# Patient Record
Sex: FEMALE | Race: WHITE | NOT HISPANIC OR LATINO | Employment: PART TIME | URBAN - METROPOLITAN AREA
[De-identification: names, ages, dates, MRNs, and addresses within clinical notes are randomized per-mention and may not be internally consistent; named-entity substitution may affect disease eponyms.]

---

## 2017-08-09 ENCOUNTER — GENERIC CONVERSION - ENCOUNTER (OUTPATIENT)
Dept: OTHER | Facility: OTHER | Age: 67
End: 2017-08-09

## 2017-09-24 ENCOUNTER — OFFICE VISIT (OUTPATIENT)
Dept: URGENT CARE | Facility: MEDICAL CENTER | Age: 67
End: 2017-09-24
Payer: COMMERCIAL

## 2017-09-24 ENCOUNTER — APPOINTMENT (OUTPATIENT)
Dept: RADIOLOGY | Facility: MEDICAL CENTER | Age: 67
End: 2017-09-24
Payer: COMMERCIAL

## 2017-09-24 DIAGNOSIS — S99.929A INJURY OF FOOT: ICD-10-CM

## 2017-09-24 PROCEDURE — 73630 X-RAY EXAM OF FOOT: CPT

## 2017-09-24 PROCEDURE — 99213 OFFICE O/P EST LOW 20 MIN: CPT

## 2018-03-14 ENCOUNTER — APPOINTMENT (OUTPATIENT)
Dept: RADIOLOGY | Facility: MEDICAL CENTER | Age: 68
End: 2018-03-14
Payer: COMMERCIAL

## 2018-03-14 ENCOUNTER — TRANSCRIBE ORDERS (OUTPATIENT)
Dept: ADMINISTRATIVE | Facility: HOSPITAL | Age: 68
End: 2018-03-14

## 2018-03-14 DIAGNOSIS — M25.552 PAIN OF LEFT HIP JOINT: Primary | ICD-10-CM

## 2018-03-14 PROCEDURE — 73502 X-RAY EXAM HIP UNI 2-3 VIEWS: CPT

## 2018-04-06 ENCOUNTER — OFFICE VISIT (OUTPATIENT)
Dept: OBGYN CLINIC | Facility: MEDICAL CENTER | Age: 68
End: 2018-04-06
Payer: COMMERCIAL

## 2018-04-06 VITALS
SYSTOLIC BLOOD PRESSURE: 131 MMHG | BODY MASS INDEX: 32.44 KG/M2 | DIASTOLIC BLOOD PRESSURE: 70 MMHG | HEIGHT: 64 IN | WEIGHT: 190 LBS | HEART RATE: 67 BPM

## 2018-04-06 DIAGNOSIS — M16.12 ARTHRITIS OF LEFT HIP: Primary | ICD-10-CM

## 2018-04-06 DIAGNOSIS — M70.62 TROCHANTERIC BURSITIS OF LEFT HIP: ICD-10-CM

## 2018-04-06 DIAGNOSIS — M25.552 LEFT HIP PAIN: ICD-10-CM

## 2018-04-06 PROCEDURE — 20610 DRAIN/INJ JOINT/BURSA W/O US: CPT | Performed by: ORTHOPAEDIC SURGERY

## 2018-04-06 PROCEDURE — 99203 OFFICE O/P NEW LOW 30 MIN: CPT | Performed by: ORTHOPAEDIC SURGERY

## 2018-04-06 RX ORDER — TORSEMIDE 20 MG/1
20 TABLET ORAL 2 TIMES DAILY
Refills: 3 | COMMUNITY
Start: 2018-03-13

## 2018-04-06 RX ORDER — LIDOCAINE HYDROCHLORIDE 10 MG/ML
2 INJECTION, SOLUTION INFILTRATION; PERINEURAL
Status: COMPLETED | OUTPATIENT
Start: 2018-04-06 | End: 2018-04-06

## 2018-04-06 RX ORDER — LEVOTHYROXINE SODIUM 100 UG/1
CAPSULE ORAL
Refills: 3 | COMMUNITY
Start: 2018-01-14

## 2018-04-06 RX ORDER — METHYLPREDNISOLONE ACETATE 40 MG/ML
2 INJECTION, SUSPENSION INTRA-ARTICULAR; INTRALESIONAL; INTRAMUSCULAR; SOFT TISSUE
Status: COMPLETED | OUTPATIENT
Start: 2018-04-06 | End: 2018-04-06

## 2018-04-06 RX ORDER — ALLOPURINOL 100 MG/1
100 TABLET ORAL 2 TIMES DAILY
Refills: 3 | COMMUNITY
Start: 2018-03-28

## 2018-04-06 RX ORDER — BUPROPION HYDROCHLORIDE 150 MG/1
TABLET, EXTENDED RELEASE ORAL
Refills: 3 | COMMUNITY
Start: 2018-03-28 | End: 2019-11-01

## 2018-04-06 RX ORDER — OMEPRAZOLE 40 MG/1
40 CAPSULE, DELAYED RELEASE ORAL
Refills: 3 | COMMUNITY
Start: 2018-01-15

## 2018-04-06 RX ORDER — UBIDECARENONE 75 MG
CAPSULE ORAL
COMMUNITY

## 2018-04-06 RX ORDER — BUPIVACAINE HYDROCHLORIDE 2.5 MG/ML
2 INJECTION, SOLUTION INFILTRATION; PERINEURAL
Status: COMPLETED | OUTPATIENT
Start: 2018-04-06 | End: 2018-04-06

## 2018-04-06 RX ORDER — ESCITALOPRAM OXALATE 10 MG/1
10 TABLET ORAL
Refills: 3 | COMMUNITY
Start: 2018-03-10 | End: 2020-07-28

## 2018-04-06 RX ADMIN — METHYLPREDNISOLONE ACETATE 2 ML: 40 INJECTION, SUSPENSION INTRA-ARTICULAR; INTRALESIONAL; INTRAMUSCULAR; SOFT TISSUE at 15:18

## 2018-04-06 RX ADMIN — LIDOCAINE HYDROCHLORIDE 2 ML: 10 INJECTION, SOLUTION INFILTRATION; PERINEURAL at 15:18

## 2018-04-06 RX ADMIN — BUPIVACAINE HYDROCHLORIDE 2 ML: 2.5 INJECTION, SOLUTION INFILTRATION; PERINEURAL at 15:18

## 2018-04-06 NOTE — PROGRESS NOTES
74 y o female presents for initial evaluation of left hip pain patient notes that she has had left hip pain for several months in both the lateral aspect of the hip and the groin  She notes that the groin is more painful on a daily basis the lateral aspect of the hip  She denies any radiating down the anterior aspect of the leg not past the knee  No complaints of falling traumatic injury or distal numbing or pain    Review of Systems  Review of systems negative unless otherwise specified in HPI    Past Medical History  Past Medical History:   Diagnosis Date    Cancer [facility])        Past Surgical History  Past Surgical History:   Procedure Laterality Date    GALLBLADDER SURGERY      GASTRIC RESTRICTION SURGERY      HERNIA REPAIR      HYSTERECTOMY         Current Medications  No current outpatient prescriptions on file prior to visit  No current facility-administered medications on file prior to visit          Recent Labs [facility])    0  Lab Value Date/Time   HCT 40 8 05/11/2016 0805   HCT 43 7 03/04/2015 0846   HGB 13 7 05/11/2016 0805   HGB 14 5 03/04/2015 0846   WBC 5 94 05/11/2016 0805   WBC 6 59 03/04/2015 0846   INR 1 12 01/13/2014 1453   GLUCOSE 81 05/11/2016 0805   GLUCOSE 86 10/07/2015 0935         Physical exam  · General: Awake, Alert, Oriented  · Eyes: Pupils equal, round and reactive to light  · Heart: regular rate and rhythm  · Lungs: No audible wheezing  · Abdomen: soft  left Lower extremity  · Tender to palpation over groin as well as greater trochanter  · Hip flexion 120° hip abduction 50° external rotation 40° internal rotation 40° pain with hip flexion hip internal rotation  · 5/5 strength hip flexion hip abduction  · Negative straight leg raise  · Positive Stinchfield  · Negative CHICO  ·     Procedure  Large joint arthrocentesis  Date/Time: 4/6/2018 3:18 PM  Consent given by: patient  Supporting Documentation  Indications: pain   Procedure Details  Location: hip - L greater trochanteric bursa  Preparation: Patient was prepped and draped in the usual sterile fashion  Needle size: 22 G  Ultrasound guidance: no  Approach: lateral  Medications administered: 2 mL bupivacaine 0 25 %; 2 mL lidocaine 1 %; 2 mL methylPREDNISolone acetate 40 mg/mL              Imaging  X-ray left hip shows moderate degenerative arthritis    Assessment:   74 y  o female with left hip osteoarthritis    Plan  · Weightbearing:  As tolerated  · Activity:   As tolerate  ·  patient will be scheduled for fluoroscopically guided left hip injection  · Follow-up: 3 months

## 2018-04-11 ENCOUNTER — HOSPITAL ENCOUNTER (OUTPATIENT)
Dept: RADIOLOGY | Facility: HOSPITAL | Age: 68
Discharge: HOME | End: 2018-04-11
Admitting: RADIOLOGY
Payer: COMMERCIAL

## 2018-04-11 DIAGNOSIS — M16.12 ARTHRITIS OF LEFT HIP: ICD-10-CM

## 2018-04-11 PROCEDURE — 20610 DRAIN/INJ JOINT/BURSA W/O US: CPT

## 2018-04-11 PROCEDURE — 77002 NEEDLE LOCALIZATION BY XRAY: CPT

## 2018-04-11 RX ORDER — BETAMETHASONE SODIUM PHOSPHATE AND BETAMETHASONE ACETATE 3; 3 MG/ML; MG/ML
12 INJECTION, SUSPENSION INTRA-ARTICULAR; INTRALESIONAL; INTRAMUSCULAR; SOFT TISSUE ONCE
Status: COMPLETED | OUTPATIENT
Start: 2018-04-11 | End: 2018-04-11

## 2018-04-11 RX ORDER — BUPIVACAINE HYDROCHLORIDE 2.5 MG/ML
30 INJECTION, SOLUTION EPIDURAL; INFILTRATION; INTRACAUDAL
Status: COMPLETED | OUTPATIENT
Start: 2018-04-11 | End: 2018-04-11

## 2018-04-11 RX ORDER — LIDOCAINE HYDROCHLORIDE 10 MG/ML
20 INJECTION, SOLUTION INFILTRATION; PERINEURAL
Status: COMPLETED | OUTPATIENT
Start: 2018-04-11 | End: 2018-04-11

## 2018-04-11 RX ADMIN — BUPIVACAINE HYDROCHLORIDE 2 ML: 2.5 INJECTION, SOLUTION EPIDURAL; INFILTRATION; INTRACAUDAL at 12:30

## 2018-04-11 RX ADMIN — LIDOCAINE HYDROCHLORIDE 2 ML: 10 INJECTION, SOLUTION INFILTRATION; PERINEURAL at 12:30

## 2018-04-11 RX ADMIN — BETAMETHASONE SODIUM PHOSPHATE AND BETAMETHASONE ACETATE 12 MG: 3; 3 INJECTION, SUSPENSION INTRA-ARTICULAR; INTRALESIONAL; INTRAMUSCULAR at 12:30

## 2018-04-11 RX ADMIN — IOHEXOL 2 ML: 300 INJECTION, SOLUTION INTRAVENOUS at 12:30

## 2018-12-06 ENCOUNTER — TELEPHONE (OUTPATIENT)
Dept: BARIATRICS | Facility: CLINIC | Age: 68
End: 2018-12-06

## 2018-12-06 NOTE — TELEPHONE ENCOUNTER
----- Message from Elizabeth Butler RN sent at 2018  8:14 AM EST -----  Regardin year f/u  Please call patient and schedule 6 year follow up appointment    Thank You

## 2018-12-11 ENCOUNTER — HOSPITAL ENCOUNTER (OUTPATIENT)
Dept: RADIOLOGY | Facility: HOSPITAL | Age: 68
Discharge: HOME | End: 2018-12-11
Attending: ORTHOPAEDIC SURGERY
Payer: COMMERCIAL

## 2018-12-11 ENCOUNTER — OFFICE VISIT (OUTPATIENT)
Dept: OBGYN CLINIC | Facility: HOSPITAL | Age: 68
End: 2018-12-11
Payer: COMMERCIAL

## 2018-12-11 VITALS
DIASTOLIC BLOOD PRESSURE: 80 MMHG | HEART RATE: 55 BPM | WEIGHT: 208 LBS | BODY MASS INDEX: 35.51 KG/M2 | SYSTOLIC BLOOD PRESSURE: 162 MMHG | HEIGHT: 64 IN

## 2018-12-11 DIAGNOSIS — M19.032 CMC DJD(CARPOMETACARPAL DEGENERATIVE JOINT DISEASE), LOCALIZED PRIMARY, LEFT: ICD-10-CM

## 2018-12-11 DIAGNOSIS — M19.032 CMC DJD(CARPOMETACARPAL DEGENERATIVE JOINT DISEASE), LOCALIZED PRIMARY, LEFT: Primary | ICD-10-CM

## 2018-12-11 DIAGNOSIS — M67.432 GANGLION CYST OF DORSUM OF LEFT WRIST: ICD-10-CM

## 2018-12-11 DIAGNOSIS — G56.02 CARPAL TUNNEL SYNDROME ON LEFT: ICD-10-CM

## 2018-12-11 PROCEDURE — 73120 X-RAY EXAM OF HAND: CPT

## 2018-12-11 PROCEDURE — 99213 OFFICE O/P EST LOW 20 MIN: CPT | Performed by: ORTHOPAEDIC SURGERY

## 2018-12-11 NOTE — PROGRESS NOTES
Assessment:  No diagnosis found  Plan:  The patient presents with left thumb CMC joint pain with numbness into thumb, index and long finger for 5-6 months with no injury  Using her hand increases both pain and numbness  Exam shows tenderness over dorsal radiocarpal and CMC joint as well as positive tinel's sign  X-rays demonstrate CMC joint arthritic changes  The patient will be referred to [address]  Follow up PRN  To do next visit:  No Follow-up on file  The above stated was discussed in layman's terms and the patient expressed understanding  All questions were answered to the patient's satisfaction  Scribe Attestation    I,:   Chi Dasilva am acting as a scribe while in the presence of the attending physician :        I,:   Mj Rubin MD personally performed the services described in this documentation    as scribed in my presence :              Subjective:   Alyssa Lundy is a 79 y o  female who presents for left thumb pain  She has had symptoms for 5-6 months with no injury  Today she complains of left thumb pain and swelling  Using the left hand can create numbness of digits 1-3  She rates her pain 5/10  Grasping itmes is challenging  Using the hand aggravates the pain and numbness  Resting the hand alleviates  She does use tylenol for her pain with some benefit  She denies previous history of left thumb issues  Review of systems negative unless otherwise specified in HPI    Past Medical History:   Diagnosis Date    Cancer [facility])        Past Surgical History:   Procedure Laterality Date    GALLBLADDER SURGERY      GASTRIC RESTRICTION SURGERY      HERNIA REPAIR      HYSTERECTOMY         Family History   Problem Relation Age of Onset    Heart attack Mother     Asthma Mother     Heart attack Father        Social History     Occupational History    Not on file       Social History Main Topics    Smoking status: Former Smoker    Smokeless tobacco: Never Used  Alcohol use Yes    Drug use: Unknown    Sexual activity: Not on file         Current Outpatient Prescriptions:     allopurinol (ZYLOPRIM) 100 mg tablet, Take 100 mg by mouth 2 (two) times a day, Disp: , Rfl: 3    buPROPion (WELLBUTRIN SR) 150 mg 12 hr tablet, TAKE 1 TABLET BY MOUTH IN THE MORNING FOR 4 DAYS, THEN INCREASE TO 1 TABLET TWICE DAILY, Disp: , Rfl: 3    Calcium Carbonate-Vitamin D 600-400 MG-UNIT per chew tablet, Chew, Disp: , Rfl:     cyanocobalamin (VITAMIN B-12) 100 mcg tablet, Take by mouth, Disp: , Rfl:     escitalopram (LEXAPRO) 10 mg tablet, Take 10 mg by mouth daily, Disp: , Rfl: 3    levothyroxine 75 mcg tablet, Take 75 mcg by mouth daily, Disp: , Rfl: 3    omeprazole (PriLOSEC) 40 MG capsule, Take 40 mg by mouth daily, Disp: , Rfl: 3    torsemide (DEMADEX) 20 mg tablet, Take 20 mg by mouth 2 (two) times a day, Disp: , Rfl: 3    Allergies   Allergen Reactions    Morphine Hcl  [Morphine] Itching            Vitals:    12/11/18 1632   BP: 162/80   Pulse: 55       Objective:          Physical Exam  Physical exam  · General: Awake, Alert, Oriented  · Eyes: Pupils equal, round and reactive to light  · Heart: regular rate and rhythm  · Lungs: No audible wheezing  · Abdomen: soft                    Ortho Exam  Left thumb:  Limited motion of CMC joint, pain with motion  Pain with extension, flexion and abduction  TTP CMC joint  Slight atrophy of thenar eminence compared to contralateral side     Left wrist:  Positive tinel's sign  Palpable mass over dorsum of left radial wrist  TTP left radiocarpal joint           Diagnostics, reviewed and taken today if performed as documented: The attending physician has personally reviewed the pertinent films in PACS and interpretation is as follows:  Left thumb/wrist x-ray: arthritic changes at [facility] joint      Procedures, if performed today:    Procedures    None performed      Portions of the record may have been created with voice recognition software   Occasional wrong word or "sound a like" substitutions may have occurred due to the inherent limitations of voice recognition software   Read the chart carefully and recognize, using context, where substitutions have occurred

## 2019-01-18 ENCOUNTER — PREP FOR PROCEDURE (OUTPATIENT)
Dept: OBGYN CLINIC | Facility: HOSPITAL | Age: 69
End: 2019-01-18

## 2019-01-18 ENCOUNTER — HOSPITAL ENCOUNTER (OUTPATIENT)
Dept: RADIOLOGY | Facility: HOSPITAL | Age: 69
Discharge: HOME | End: 2019-01-18
Attending: ORTHOPAEDIC SURGERY
Payer: COMMERCIAL

## 2019-01-18 ENCOUNTER — OFFICE VISIT (OUTPATIENT)
Dept: OBGYN CLINIC | Facility: HOSPITAL | Age: 69
End: 2019-01-18
Attending: ORTHOPAEDIC SURGERY
Payer: COMMERCIAL

## 2019-01-18 VITALS
WEIGHT: 209 LBS | BODY MASS INDEX: 35.68 KG/M2 | DIASTOLIC BLOOD PRESSURE: 72 MMHG | HEART RATE: 62 BPM | SYSTOLIC BLOOD PRESSURE: 154 MMHG | HEIGHT: 64 IN

## 2019-01-18 DIAGNOSIS — G56.02 CARPAL TUNNEL SYNDROME ON LEFT: ICD-10-CM

## 2019-01-18 DIAGNOSIS — M18.12 ARTHRITIS OF CARPOMETACARPAL (CMC) JOINT OF LEFT THUMB: Primary | ICD-10-CM

## 2019-01-18 DIAGNOSIS — M79.645 CHRONIC PAIN OF LEFT THUMB: ICD-10-CM

## 2019-01-18 DIAGNOSIS — G89.29 CHRONIC PAIN OF LEFT THUMB: ICD-10-CM

## 2019-01-18 DIAGNOSIS — M67.432 GANGLION CYST OF DORSUM OF LEFT WRIST: ICD-10-CM

## 2019-01-18 PROCEDURE — 99214 OFFICE O/P EST MOD 30 MIN: CPT | Performed by: ORTHOPAEDIC SURGERY

## 2019-01-18 PROCEDURE — 73140 X-RAY EXAM OF FINGER(S): CPT

## 2019-01-18 RX ORDER — CEFAZOLIN SODIUM 2 G/50ML
2000 SOLUTION INTRAVENOUS ONCE
Status: CANCELLED | OUTPATIENT
Start: 2019-01-18 | End: 2019-01-18

## 2019-01-18 NOTE — PROGRESS NOTES
ASSESSMENT/PLAN:    Assessment:   Carpal Tunnel Syndrome  left and Thumb CMC Arthritis  left    Plan:   Endoscopic Carpal Tunnel Release  left and Thumb Interpositional Arthroplasty Brenda Jenkins) with FCR transfer  left    Follow Up: After Surgery    To Do Next Visit:  Sutures out    General Discussions:       Operative Discussions:  Endoscopic Carpal Tunnel Release: The anatomy and physiology of carpal tunnel syndrome was discussed with the patient today  Increase pressure localized under the transverse carpal ligament can cause pain, numbness, tingling, or dysesthesias within the median nerve distribution as well as feelings of fatigue, clumsiness, or awkwardness  These symptoms typically occur at night and worse in the morning upon waking  Eventually, untreated carpal tunnel syndrome can result in weakness and permanent loss of muscle within the thenar compartment of the hand  Treatment options were discussed with the patient  Conservative treatment includes nocturnal resting splints to keep the nerve in a neutral position, ergonomic changes within the work or home environment, activity modification, and tendon gliding exercises  Steroid injections within the carpal canal can help a majority of patients, however this is often self-limited in a majority of patients  Surgical intervention to divide the transverse carpal ligament typically results in a long-lasting relief of the patient's complaints, with the recurrence rate of less than 1%  The patient has elected to undergo an endoscopic carpal tunnel release  The 2 incision technique was discussed with the patient, which results in approximately a two-week less recovery time, and less wound complications  In the postoperative period, light activities are allowed immediately, driving is allowed when narcotic medication has stopped, and the bandages may be removed and incision may get wet after 2 days    Heavy activities (lifting more than approximately 10 pounds) will be allowed after follow up appointment in 1-2 weeks  While the pain and discomfort in the hands generally improves rapidly, the numbness and tingling as well as the strength will slowly improve over weeks to months depending on the severity of the carpal tunnel syndrome  Pillar pain was discussed with the patient, which is typically a common but self-limiting condition  The risks of bleeding and infection from the surgery are less than 1%  Risk of recurrence is approximately 0 5%  The risks of nerve injury or nerve damage or damage to the blood vessels is approximately 1 in 1200  The patient has an understanding of the above mentioned discussion  The risks and benefits of the procedure were explained to the patient, which include, but are not limited to: Bleeding, infection, recurrence, pain, scar, damage to tendons, damage to nerves, and damage to blood vessels, failure to give desired results and complications related to anesthesia   These risks, along with alternative conservative treatment options, and postoperative protocols were voiced back and understood by the patient   All questions were answered to the patient's satisfaction   The patient agrees to comply with a standard postoperative protocol, and is willing to proceed   Education was provided via written and auditory forms   There were no barriers to learning  Written handouts regarding wound care, incision and scar care, and general preoperative information was provided to the patient   Prior to surgery, the patient may be requested to stop all anti-inflammatory medications   Prophylactic aspirin, Plavix, and Coumadin may be allowed to be continued   Medications including vitamin E , ginkgo, and fish oil are requested to be stopped approximately one week prior to surgery   Hypertensive medications and beta blockers, if taken, should be continued        _____________________________________________________  CHIEF COMPLAINT:  Chief Complaint   Patient presents with    Left Thumb - Pain         SUBJECTIVE:  Asher Carvajal is a 79y o  year old female who presents with Pain  Severe  Intermittant  Sharp and Aching to the left thumb  This started  >5 year(s) ago as Sudden  Patient also c/o numbness and tingling that does wake her from sleep at night  Patient states that she cannot even wring out a wash cloth or open a jar due to her discomfort at her thumb  She states that she hardly uses her left hand at this point in time and uses her right hand for everything     Radiation: Yes to the  index finger, long finger and thumb  Previous Treatments: activity modification, NSAIDs, tylenol without relief  Associated symptoms: No Complaints    PAST MEDICAL HISTORY:  Past Medical History:   Diagnosis Date    Cancer ([address] )        PAST SURGICAL HISTORY:  Past Surgical History:   Procedure Laterality Date    GALLBLADDER SURGERY      GASTRIC RESTRICTION SURGERY      HERNIA REPAIR      HYSTERECTOMY         FAMILY HISTORY:  Family History   Problem Relation Age of Onset    Heart attack Mother     Asthma Mother     Heart attack Father        SOCIAL HISTORY:  Social History   Substance Use Topics    Smoking status: Former Smoker    Smokeless tobacco: Never Used    Alcohol use Yes       MEDICATIONS:    Current Outpatient Prescriptions:     allopurinol (ZYLOPRIM) 100 mg tablet, Take 100 mg by mouth 2 (two) times a day, Disp: , Rfl: 3    buPROPion (WELLBUTRIN SR) 150 mg 12 hr tablet, TAKE 1 TABLET BY MOUTH IN THE MORNING FOR 4 DAYS, THEN INCREASE TO 1 TABLET TWICE DAILY, Disp: , Rfl: 3    Calcium Carbonate-Vitamin D 600-400 MG-UNIT per chew tablet, Chew, Disp: , Rfl:     cyanocobalamin (VITAMIN B-12) 100 mcg tablet, Take by mouth, Disp: , Rfl:     escitalopram (LEXAPRO) 10 mg tablet, Take 10 mg by mouth daily, Disp: , Rfl: 3    levothyroxine 75 mcg tablet, Take 75 mcg by mouth daily, Disp: , Rfl: 3    omeprazole (PriLOSEC) 40 MG capsule, Take 40 mg by mouth daily, Disp: , Rfl: 3    torsemide (DEMADEX) 20 mg tablet, Take 20 mg by mouth 2 (two) times a day, Disp: , Rfl: 3    ALLERGIES:  Allergies   Allergen Reactions    Morphine Hcl  [Morphine] Itching       REVIEW OF SYSTEMS:  Pertinent items are noted in HPI  LABS:  HgA1c: No results found for: HGBA1C  BMP:   Lab Results   Component Value Date    GLUCOSE 86 10/07/2015    CALCIUM 8 9 05/11/2016     10/07/2015    K 3 8 05/11/2016    CO2 32 05/11/2016     05/11/2016    BUN 13 05/11/2016    CREATININE 0 75 05/11/2016         _____________________________________________________  PHYSICAL EXAMINATION:  General: well developed and well nourished, alert, oriented times 3 and appears comfortable  Psychiatric: Normal  HEENT: Trachea Midline, No torticollis  Cardiovascular: No discernable arrhythmia  Pulmonary: No wheezing or stridor  Skin: No masses, erthema, lacerations, fluctation, ulcerations  Neurovascular: Pulses Intact    MUSCULOSKELETAL EXAMINATION:  LEFT SIDE:  CMC: Positive grind, Positive tendnerness CMC and crepitation noted, hyperextension of 25 degrees and Carpal tunnel:  Weakness APB, Postive Tinel's, Positive Derkin's Compression Test and AIN 5/5, apb 4+/5    _____________________________________________________  STUDIES REVIEWED:  Images were reviewd in PACS: xray of left thumb on 1/18/19 demonstrates stage 3 arthritic changes to left thumb CMC joint         PROCEDURES PERFORMED:  Procedures  No Procedures performed today

## 2019-01-18 NOTE — H&P
ASSESSMENT/PLAN:    Assessment:   Carpal Tunnel Syndrome  left and Thumb CMC Arthritis  left    Plan:   Endoscopic Carpal Tunnel Release  left and Thumb Interpositional Arthroplasty Fran Dandy) with FCR transfer  left    Follow Up: After Surgery    To Do Next Visit:  Sutures out    General Discussions:       Operative Discussions:  Endoscopic Carpal Tunnel Release: The anatomy and physiology of carpal tunnel syndrome was discussed with the patient today  Increase pressure localized under the transverse carpal ligament can cause pain, numbness, tingling, or dysesthesias within the median nerve distribution as well as feelings of fatigue, clumsiness, or awkwardness  These symptoms typically occur at night and worse in the morning upon waking  Eventually, untreated carpal tunnel syndrome can result in weakness and permanent loss of muscle within the thenar compartment of the hand  Treatment options were discussed with the patient  Conservative treatment includes nocturnal resting splints to keep the nerve in a neutral position, ergonomic changes within the work or home environment, activity modification, and tendon gliding exercises  Steroid injections within the carpal canal can help a majority of patients, however this is often self-limited in a majority of patients  Surgical intervention to divide the transverse carpal ligament typically results in a long-lasting relief of the patient's complaints, with the recurrence rate of less than 1%  The patient has elected to undergo an endoscopic carpal tunnel release  The 2 incision technique was discussed with the patient, which results in approximately a two-week less recovery time, and less wound complications  In the postoperative period, light activities are allowed immediately, driving is allowed when narcotic medication has stopped, and the bandages may be removed and incision may get wet after 2 days    Heavy activities (lifting more than approximately 10 pounds) will be allowed after follow up appointment in 1-2 weeks  While the pain and discomfort in the hands generally improves rapidly, the numbness and tingling as well as the strength will slowly improve over weeks to months depending on the severity of the carpal tunnel syndrome  Pillar pain was discussed with the patient, which is typically a common but self-limiting condition  The risks of bleeding and infection from the surgery are less than 1%  Risk of recurrence is approximately 0 5%  The risks of nerve injury or nerve damage or damage to the blood vessels is approximately 1 in 1200  The patient has an understanding of the above mentioned discussion  The risks and benefits of the procedure were explained to the patient, which include, but are not limited to: Bleeding, infection, recurrence, pain, scar, damage to tendons, damage to nerves, and damage to blood vessels, failure to give desired results and complications related to anesthesia   These risks, along with alternative conservative treatment options, and postoperative protocols were voiced back and understood by the patient   All questions were answered to the patient's satisfaction   The patient agrees to comply with a standard postoperative protocol, and is willing to proceed   Education was provided via written and auditory forms   There were no barriers to learning  Written handouts regarding wound care, incision and scar care, and general preoperative information was provided to the patient   Prior to surgery, the patient may be requested to stop all anti-inflammatory medications   Prophylactic aspirin, Plavix, and Coumadin may be allowed to be continued   Medications including vitamin E , ginkgo, and fish oil are requested to be stopped approximately one week prior to surgery   Hypertensive medications and beta blockers, if taken, should be continued        _____________________________________________________  CHIEF COMPLAINT:  Chief Complaint   Patient presents with    Left Thumb - Pain         SUBJECTIVE:  Imelda Brooke is a 79y o  year old female who presents with Pain  Severe  Intermittant  Sharp and Aching to the left thumb  This started  >5 year(s) ago as Sudden  Patient also c/o numbness and tingling that does wake her from sleep at night  Patient states that she cannot even wring out a wash cloth or open a jar due to her discomfort at her thumb  She states that she hardly uses her left hand at this point in time and uses her right hand for everything     Radiation: Yes to the  index finger, long finger and thumb  Previous Treatments: activity modification, NSAIDs, tylenol without relief  Associated symptoms: No Complaints    PAST MEDICAL HISTORY:  Past Medical History:   Diagnosis Date    [address] [address] )        PAST SURGICAL HISTORY:  Past Surgical History:   Procedure Laterality Date    GALLBLADDER SURGERY      GASTRIC RESTRICTION SURGERY      HERNIA REPAIR      HYSTERECTOMY         FAMILY HISTORY:  Family History   Problem Relation Age of Onset    Heart attack Mother     Asthma Mother     Heart attack Father        SOCIAL HISTORY:  Social History   Substance Use Topics    Smoking status: Former Smoker    Smokeless tobacco: Never Used    Alcohol use Yes       MEDICATIONS:    Current Outpatient Prescriptions:     allopurinol (ZYLOPRIM) 100 mg tablet, Take 100 mg by mouth 2 (two) times a day, Disp: , Rfl: 3    buPROPion (WELLBUTRIN SR) 150 mg 12 hr tablet, TAKE 1 TABLET BY MOUTH IN THE MORNING FOR 4 DAYS, THEN INCREASE TO 1 TABLET TWICE DAILY, Disp: , Rfl: 3    Calcium Carbonate-Vitamin D 600-400 MG-UNIT per chew tablet, Chew, Disp: , Rfl:     cyanocobalamin (VITAMIN B-12) 100 mcg tablet, Take by mouth, Disp: , Rfl:     escitalopram (LEXAPRO) 10 mg tablet, Take 10 mg by mouth daily, Disp: , Rfl: 3    levothyroxine 75 mcg tablet, Take 75 mcg by mouth daily, Disp: , Rfl: 3    omeprazole (PriLOSEC) 40 MG capsule, Take 40 mg by mouth daily, Disp: , Rfl: 3    torsemide (DEMADEX) 20 mg tablet, Take 20 mg by mouth 2 (two) times a day, Disp: , Rfl: 3    ALLERGIES:  Allergies   Allergen Reactions    Morphine Hcl  [Morphine] Itching       REVIEW OF SYSTEMS:  Pertinent items are noted in HPI  LABS:  HgA1c: No results found for: HGBA1C  BMP:   Lab Results   Component Value Date    GLUCOSE 86 10/07/2015    CALCIUM 8 9 05/11/2016     10/07/2015    K 3 8 05/11/2016    CO2 32 05/11/2016     05/11/2016    BUN 13 05/11/2016    CREATININE 0 75 05/11/2016         _____________________________________________________  PHYSICAL EXAMINATION:  General: well developed and well nourished, alert, oriented times 3 and appears comfortable  Psychiatric: Normal  HEENT: Trachea Midline, No torticollis  Cardiovascular: No discernable arrhythmia  Pulmonary: No wheezing or stridor  Skin: No masses, erthema, lacerations, fluctation, ulcerations  Neurovascular: Pulses Intact    MUSCULOSKELETAL EXAMINATION:  LEFT SIDE:  CMC: Positive grind, Positive tendnerness CMC and crepitation noted, hyperextension of 25 degrees and Carpal tunnel:  Weakness APB, Postive Tinel's, Positive Derkin's Compression Test and AIN 5/5, apb 4+/5    _____________________________________________________  STUDIES REVIEWED:  Images were reviewd in PACS: xray of left thumb on 1/18/19 demonstrates stage 3 arthritic changes to left thumb CMC joint         PROCEDURES PERFORMED:  Procedures  No Procedures performed today

## 2019-02-26 ENCOUNTER — HOSPITAL ENCOUNTER (OUTPATIENT)
Dept: NON INVASIVE DIAGNOSTICS | Facility: CLINIC | Age: 69
Discharge: HOME | End: 2019-02-26
Attending: FAMILY MEDICINE
Payer: COMMERCIAL

## 2019-02-26 ENCOUNTER — TRANSCRIBE ORDERS (OUTPATIENT)
Dept: ADMINISTRATIVE | Facility: HOSPITAL | Age: 69
End: 2019-02-26

## 2019-02-26 DIAGNOSIS — M79.651 RIGHT THIGH PAIN: ICD-10-CM

## 2019-02-26 DIAGNOSIS — M79.651 RIGHT THIGH PAIN: Primary | ICD-10-CM

## 2019-02-26 PROCEDURE — 93971 EXTREMITY STUDY: CPT

## 2019-02-27 ENCOUNTER — APPOINTMENT (OUTPATIENT)
Dept: LAB | Facility: MEDICAL CENTER | Age: 69
End: 2019-02-27
Payer: COMMERCIAL

## 2019-02-27 ENCOUNTER — CLINICAL SUPPORT (OUTPATIENT)
Dept: URGENT CARE | Facility: MEDICAL CENTER | Age: 69
End: 2019-02-27
Payer: COMMERCIAL

## 2019-02-27 DIAGNOSIS — M18.12 ARTHRITIS OF CARPOMETACARPAL (CMC) JOINT OF LEFT THUMB: ICD-10-CM

## 2019-02-27 LAB
ANION GAP SERPL CALCULATED.3IONS-SCNC: 3 MMOL/L (ref 4–13)
ATRIAL RATE: 54 BPM
BASOPHILS # BLD AUTO: 0.08 THOUSANDS/ΜL (ref 0–0.1)
BASOPHILS NFR BLD AUTO: 1 % (ref 0–1)
BUN SERPL-MCNC: 19 MG/DL (ref 5–25)
CALCIUM SERPL-MCNC: 9.2 MG/DL (ref 8.3–10.1)
CHLORIDE SERPL-SCNC: 105 MMOL/L (ref 100–108)
CO2 SERPL-SCNC: 32 MMOL/L (ref 21–32)
CREAT SERPL-MCNC: 0.83 MG/DL (ref 0.6–1.3)
EOSINOPHIL # BLD AUTO: 0.75 THOUSAND/ΜL (ref 0–0.61)
EOSINOPHIL NFR BLD AUTO: 10 % (ref 0–6)
ERYTHROCYTE [DISTWIDTH] IN BLOOD BY AUTOMATED COUNT: 14 % (ref 11.6–15.1)
GFR SERPL CREATININE-BSD FRML MDRD: 74 ML/MIN/1.73SQ M
GLUCOSE P FAST SERPL-MCNC: 60 MG/DL (ref 65–99)
HCT VFR BLD AUTO: 43 % (ref 34.8–46.1)
HGB BLD-MCNC: 13.6 G/DL (ref 11.5–15.4)
IMM GRANULOCYTES # BLD AUTO: 0.03 THOUSAND/UL (ref 0–0.2)
IMM GRANULOCYTES NFR BLD AUTO: 0 % (ref 0–2)
LYMPHOCYTES # BLD AUTO: 1.03 THOUSANDS/ΜL (ref 0.6–4.47)
LYMPHOCYTES NFR BLD AUTO: 14 % (ref 14–44)
MCH RBC QN AUTO: 30.8 PG (ref 26.8–34.3)
MCHC RBC AUTO-ENTMCNC: 31.6 G/DL (ref 31.4–37.4)
MCV RBC AUTO: 97 FL (ref 82–98)
MONOCYTES # BLD AUTO: 0.55 THOUSAND/ΜL (ref 0.17–1.22)
MONOCYTES NFR BLD AUTO: 7 % (ref 4–12)
NEUTROPHILS # BLD AUTO: 4.98 THOUSANDS/ΜL (ref 1.85–7.62)
NEUTS SEG NFR BLD AUTO: 68 % (ref 43–75)
NRBC BLD AUTO-RTO: 0 /100 WBCS
P AXIS: 77 DEGREES
PLATELET # BLD AUTO: 217 THOUSANDS/UL (ref 149–390)
PMV BLD AUTO: 10.6 FL (ref 8.9–12.7)
POTASSIUM SERPL-SCNC: 4.1 MMOL/L (ref 3.5–5.3)
PR INTERVAL: 128 MS
QRS AXIS: 55 DEGREES
QRSD INTERVAL: 74 MS
QT INTERVAL: 448 MS
QTC INTERVAL: 424 MS
RBC # BLD AUTO: 4.42 MILLION/UL (ref 3.81–5.12)
SODIUM SERPL-SCNC: 140 MMOL/L (ref 136–145)
T WAVE AXIS: 41 DEGREES
VENTRICULAR RATE: 54 BPM
WBC # BLD AUTO: 7.42 THOUSAND/UL (ref 4.31–10.16)

## 2019-02-27 PROCEDURE — 93971 EXTREMITY STUDY: CPT | Performed by: SURGERY

## 2019-02-27 PROCEDURE — 85025 COMPLETE CBC W/AUTO DIFF WBC: CPT

## 2019-02-27 PROCEDURE — 93005 ELECTROCARDIOGRAM TRACING: CPT

## 2019-02-27 PROCEDURE — 80048 BASIC METABOLIC PNL TOTAL CA: CPT

## 2019-02-27 PROCEDURE — 93010 ELECTROCARDIOGRAM REPORT: CPT | Performed by: INTERNAL MEDICINE

## 2019-02-27 PROCEDURE — 36415 COLL VENOUS BLD VENIPUNCTURE: CPT

## 2019-03-19 NOTE — PRE-PROCEDURE INSTRUCTIONS
Pre-Surgery Instructions:   Medication Instructions    allopurinol (ZYLOPRIM) 100 mg tablet Instructed patient per Anesthesia Guidelines   buPROPion (WELLBUTRIN SR) 150 mg 12 hr tablet Instructed patient per Anesthesia Guidelines   Calcium Carbonate-Vitamin D 600-400 MG-UNIT per chew tablet Instructed patient per Anesthesia Guidelines   cyanocobalamin (VITAMIN B-12) 100 mcg tablet Instructed patient per Anesthesia Guidelines   escitalopram (LEXAPRO) 10 mg tablet Instructed patient per Anesthesia Guidelines   levothyroxine 75 mcg tablet Instructed patient per Anesthesia Guidelines   omeprazole (PriLOSEC) 40 MG capsule Instructed patient per Anesthesia Guidelines   torsemide (DEMADEX) 20 mg tablet Instructed patient per Anesthesia Guidelines  REVIEWED  PRINTED SURGICAL INSTRUCTIONS WITH PATIENT , PATIENT VERBALIZED UNDERSTANDING  MEDICATIONS REVIEWED  NO VITAMINS OR NSAIDS ONE WEEK BEFORE SURGERY, NPO AFTER MIDNIGHT  SOAP AND SHOWER INSTRUCTIONS REVIEWED  Patient will bring living will  No demadex or allopurinol  day of surgery  Diuretic Med Class     Continue this medication up to the evening before surgery/procedure, but do not take the morning of the day of surgery  Anti-gout Med Class     Continue to take this medication on your normal schedule  If this is an oral medication and you take it in the morning, then you may take this medicine with a sip of water  Antidepressant Med Class     Continue to take this medication on your normal schedule  If this is an oral medication and you take it in the morning, then you may take this medicine with a sip of water  Thyroxine Med Class     Continue to take this medication on your normal schedule  If this is an oral medication and you take it in the morning, then you may take this medicine with a sip of water  none

## 2019-03-26 ENCOUNTER — HOSPITAL ENCOUNTER (OUTPATIENT)
Facility: HOSPITAL | Age: 69
Setting detail: OUTPATIENT SURGERY
Discharge: HOME | End: 2019-03-26
Attending: ORTHOPAEDIC SURGERY | Admitting: ORTHOPAEDIC SURGERY
Payer: COMMERCIAL

## 2019-03-26 ENCOUNTER — ANESTHESIA EVENT (OUTPATIENT)
Dept: PERIOP | Facility: HOSPITAL | Age: 69
End: 2019-03-26
Payer: COMMERCIAL

## 2019-03-26 ENCOUNTER — ANESTHESIA (OUTPATIENT)
Dept: PERIOP | Facility: HOSPITAL | Age: 69
End: 2019-03-26
Payer: COMMERCIAL

## 2019-03-26 VITALS
TEMPERATURE: 97.3 F | RESPIRATION RATE: 20 BRPM | HEART RATE: 59 BPM | SYSTOLIC BLOOD PRESSURE: 132 MMHG | OXYGEN SATURATION: 99 % | DIASTOLIC BLOOD PRESSURE: 62 MMHG

## 2019-03-26 DIAGNOSIS — G56.02 CARPAL TUNNEL SYNDROME ON LEFT: ICD-10-CM

## 2019-03-26 DIAGNOSIS — M18.12 ARTHRITIS OF CARPOMETACARPAL (CMC) JOINT OF LEFT THUMB: ICD-10-CM

## 2019-03-26 PROCEDURE — C1765 ADHESION BARRIER: HCPCS | Performed by: ORTHOPAEDIC SURGERY

## 2019-03-26 PROCEDURE — 25310 TRANSPLANT FOREARM TENDON: CPT | Performed by: ORTHOPAEDIC SURGERY

## 2019-03-26 PROCEDURE — 25447 ARTHRP NTRCRP/CRP/MTCR NTRPS: CPT | Performed by: ORTHOPAEDIC SURGERY

## 2019-03-26 PROCEDURE — C1713 ANCHOR/SCREW BN/BN,TIS/BN: HCPCS | Performed by: ORTHOPAEDIC SURGERY

## 2019-03-26 PROCEDURE — 29848 WRIST ENDOSCOPY/SURGERY: CPT | Performed by: ORTHOPAEDIC SURGERY

## 2019-03-26 RX ORDER — MEPERIDINE HYDROCHLORIDE 25 MG/ML
12.5 INJECTION INTRAMUSCULAR; INTRAVENOUS; SUBCUTANEOUS AS NEEDED
Status: DISCONTINUED | OUTPATIENT
Start: 2019-03-26 | End: 2019-03-26 | Stop reason: HOSPADM

## 2019-03-26 RX ORDER — MIDAZOLAM HYDROCHLORIDE 1 MG/ML
INJECTION INTRAMUSCULAR; INTRAVENOUS
Status: DISCONTINUED
Start: 2019-03-26 | End: 2019-03-26 | Stop reason: HOSPADM

## 2019-03-26 RX ORDER — METOCLOPRAMIDE HYDROCHLORIDE 5 MG/ML
INJECTION INTRAMUSCULAR; INTRAVENOUS AS NEEDED
Status: DISCONTINUED | OUTPATIENT
Start: 2019-03-26 | End: 2019-03-26 | Stop reason: SURG

## 2019-03-26 RX ORDER — LIDOCAINE HYDROCHLORIDE 10 MG/ML
0.5 INJECTION, SOLUTION INFILTRATION; PERINEURAL ONCE AS NEEDED
Status: COMPLETED | OUTPATIENT
Start: 2019-03-26 | End: 2019-03-26

## 2019-03-26 RX ORDER — ROPIVACAINE HYDROCHLORIDE 5 MG/ML
INJECTION, SOLUTION EPIDURAL; INFILTRATION; PERINEURAL
Status: COMPLETED | OUTPATIENT
Start: 2019-03-26 | End: 2019-03-26

## 2019-03-26 RX ORDER — DEXAMETHASONE SODIUM PHOSPHATE 10 MG/ML
INJECTION, SOLUTION INTRAMUSCULAR; INTRAVENOUS AS NEEDED
Status: DISCONTINUED | OUTPATIENT
Start: 2019-03-26 | End: 2019-03-26 | Stop reason: SURG

## 2019-03-26 RX ORDER — FENTANYL CITRATE/PF 50 MCG/ML
25 SYRINGE (ML) INJECTION
Status: DISCONTINUED | OUTPATIENT
Start: 2019-03-26 | End: 2019-03-26 | Stop reason: HOSPADM

## 2019-03-26 RX ORDER — CEFAZOLIN SODIUM 2 G/50ML
2000 SOLUTION INTRAVENOUS ONCE
Status: COMPLETED | OUTPATIENT
Start: 2019-03-26 | End: 2019-03-26

## 2019-03-26 RX ORDER — ONDANSETRON 2 MG/ML
INJECTION INTRAMUSCULAR; INTRAVENOUS AS NEEDED
Status: DISCONTINUED | OUTPATIENT
Start: 2019-03-26 | End: 2019-03-26 | Stop reason: SURG

## 2019-03-26 RX ORDER — NAPROXEN SODIUM 220 MG
220 TABLET ORAL 2 TIMES DAILY WITH MEALS
Qty: 10 TABLET | Refills: 0 | Status: SHIPPED | OUTPATIENT
Start: 2019-03-26 | End: 2019-11-01

## 2019-03-26 RX ORDER — ALBUTEROL SULFATE 2.5 MG/3ML
2.5 SOLUTION RESPIRATORY (INHALATION) ONCE AS NEEDED
Status: DISCONTINUED | OUTPATIENT
Start: 2019-03-26 | End: 2019-03-26 | Stop reason: HOSPADM

## 2019-03-26 RX ORDER — SODIUM CHLORIDE, SODIUM LACTATE, POTASSIUM CHLORIDE, CALCIUM CHLORIDE 600; 310; 30; 20 MG/100ML; MG/100ML; MG/100ML; MG/100ML
INJECTION, SOLUTION INTRAVENOUS CONTINUOUS PRN
Status: DISCONTINUED | OUTPATIENT
Start: 2019-03-26 | End: 2019-03-26 | Stop reason: SURG

## 2019-03-26 RX ORDER — MAGNESIUM HYDROXIDE 1200 MG/15ML
LIQUID ORAL AS NEEDED
Status: DISCONTINUED | OUTPATIENT
Start: 2019-03-26 | End: 2019-03-26 | Stop reason: HOSPADM

## 2019-03-26 RX ORDER — SODIUM CHLORIDE, SODIUM LACTATE, POTASSIUM CHLORIDE, CALCIUM CHLORIDE 600; 310; 30; 20 MG/100ML; MG/100ML; MG/100ML; MG/100ML
125 INJECTION, SOLUTION INTRAVENOUS CONTINUOUS
Status: DISCONTINUED | OUTPATIENT
Start: 2019-03-26 | End: 2019-03-26 | Stop reason: HOSPADM

## 2019-03-26 RX ORDER — LABETALOL 20 MG/4 ML (5 MG/ML) INTRAVENOUS SYRINGE
5
Status: DISCONTINUED | OUTPATIENT
Start: 2019-03-26 | End: 2019-03-26 | Stop reason: HOSPADM

## 2019-03-26 RX ORDER — HYDROMORPHONE HCL/PF 1 MG/ML
0.5 SYRINGE (ML) INJECTION
Status: DISCONTINUED | OUTPATIENT
Start: 2019-03-26 | End: 2019-03-26 | Stop reason: HOSPADM

## 2019-03-26 RX ORDER — ONDANSETRON 2 MG/ML
4 INJECTION INTRAMUSCULAR; INTRAVENOUS ONCE AS NEEDED
Status: DISCONTINUED | OUTPATIENT
Start: 2019-03-26 | End: 2019-03-26 | Stop reason: HOSPADM

## 2019-03-26 RX ORDER — MIDAZOLAM HYDROCHLORIDE 1 MG/ML
INJECTION INTRAMUSCULAR; INTRAVENOUS AS NEEDED
Status: DISCONTINUED | OUTPATIENT
Start: 2019-03-26 | End: 2019-03-26 | Stop reason: SURG

## 2019-03-26 RX ORDER — PROPOFOL 10 MG/ML
INJECTION, EMULSION INTRAVENOUS AS NEEDED
Status: DISCONTINUED | OUTPATIENT
Start: 2019-03-26 | End: 2019-03-26 | Stop reason: SURG

## 2019-03-26 RX ORDER — SENNOSIDES 8.6 MG
650 CAPSULE ORAL EVERY 8 HOURS
Qty: 15 TABLET | Refills: 0 | Status: SHIPPED | OUTPATIENT
Start: 2019-03-26 | End: 2019-03-31

## 2019-03-26 RX ORDER — HYDROCODONE BITARTRATE AND ACETAMINOPHEN 5; 325 MG/1; MG/1
1 TABLET ORAL EVERY 6 HOURS PRN
Qty: 20 TABLET | Refills: 0 | Status: SHIPPED | OUTPATIENT
Start: 2019-03-26 | End: 2019-11-01

## 2019-03-26 RX ADMIN — MIDAZOLAM 2 MG: 1 INJECTION INTRAMUSCULAR; INTRAVENOUS at 09:39

## 2019-03-26 RX ADMIN — ONDANSETRON 4 MG: 2 INJECTION INTRAMUSCULAR; INTRAVENOUS at 10:51

## 2019-03-26 RX ADMIN — DEXAMETHASONE SODIUM PHOSPHATE 10 MG: 10 INJECTION, SOLUTION INTRAMUSCULAR; INTRAVENOUS at 10:51

## 2019-03-26 RX ADMIN — ROPIVACAINE HYDROCHLORIDE 30 ML: 5 INJECTION, SOLUTION EPIDURAL; INFILTRATION; PERINEURAL at 09:55

## 2019-03-26 RX ADMIN — PROPOFOL 150 MG: 10 INJECTION, EMULSION INTRAVENOUS at 10:28

## 2019-03-26 RX ADMIN — SODIUM CHLORIDE, SODIUM LACTATE, POTASSIUM CHLORIDE, AND CALCIUM CHLORIDE: .6; .31; .03; .02 INJECTION, SOLUTION INTRAVENOUS at 08:35

## 2019-03-26 RX ADMIN — SODIUM CHLORIDE, SODIUM LACTATE, POTASSIUM CHLORIDE, AND CALCIUM CHLORIDE 125 ML/HR: .6; .31; .03; .02 INJECTION, SOLUTION INTRAVENOUS at 08:36

## 2019-03-26 RX ADMIN — METOCLOPRAMIDE 10 MG: 5 INJECTION, SOLUTION INTRAMUSCULAR; INTRAVENOUS at 11:51

## 2019-03-26 RX ADMIN — LIDOCAINE HYDROCHLORIDE 0.5 ML: 10 INJECTION, SOLUTION INFILTRATION; PERINEURAL at 08:36

## 2019-03-26 RX ADMIN — FENTANYL CITRATE 25 MCG: 50 INJECTION, SOLUTION INTRAMUSCULAR; INTRAVENOUS at 12:46

## 2019-03-26 RX ADMIN — ROPIVACAINE HYDROCHLORIDE 10 ML/HR: 10 INJECTION, SOLUTION EPIDURAL at 12:30

## 2019-03-26 RX ADMIN — CEFAZOLIN SODIUM 2000 MG: 2 SOLUTION INTRAVENOUS at 10:25

## 2019-03-26 NOTE — ANESTHESIA PROCEDURE NOTES
Peripheral Block    Patient location during procedure: holding area  Start time: 3/26/2019 9:30 AM  Reason for block: at surgeon's request and post-op pain management  Staffing  Anesthesiologist: Olya Soler MD  Performed: anesthesiologist   Preanesthetic Checklist  Completed: patient identified, site marked, surgical consent, pre-op evaluation, timeout performed, IV checked, risks and benefits discussed and monitors and equipment checked  Peripheral Block  Patient position: supine  Prep: ChloraPrep  Patient monitoring: continuous pulse ox, frequent blood pressure checks, cardiac monitor and heart rate  Block type: infraclavicular  Laterality: left  Injection technique: catheter  Procedures: ultrasound guided  Ultrasound permanent image savedropivacaine (NAROPIN) 0 5 % perineural infiltration, 30 mL  Needle  Needle type: Stimuplex   Needle gauge: 22 G  Needle length: 10 cm  Needle localization: ultrasound guidance  Needle insertion depth: 5 cm  Catheter type: open end  Catheter size: 18 G  Catheter at skin depth: 5 cm  Test dose: negative  Assessment  Injection assessment: incremental injection, local visualized surrounding nerve on ultrasound, negative aspiration for heme and no paresthesia on injection  Paresthesia pain: none  Heart rate change: no  Slow fractionated injection: yes  Post-procedure:  sterile dressing applied  patient tolerated the procedure well with no immediate complications  Additional Notes  + heme after placement of the first catheter  This catheter removed and replaced with a new one under US guidance

## 2019-03-26 NOTE — OP NOTE
OPERATIVE REPORT  PATIENT NAME: Silvino Alston  :  1950  MRN: [id number]  Pt Location: [facility]    SURGERY DATE: 19    Surgeon(s) and Role:     * Miriam Ramachandran MD - Primary     * Naomi Rucker - Mannie    Pre-Op Diagnosis:  Arthritis of carpometacarpal [place]) joint of left thumb [M18 12]  Carpal tunnel syndrome on left [G56 02]    Post-Op Diagnosis Codes:     * Arthritis of carpometacarpal (CMC) joint of left thumb [M18 12]     * Carpal tunnel syndrome on left [G56 02]    Procedure(s):  RELEASE CARPAL TUNNEL ENDOSCOPIC (Left)  ARTHROPLASTY THUMB WEILBY (Left)  TRANSFER TENDON HAND/WRIST FCR FOREARM TO THUMB (Left)  APPLICATION THUMB SPICA SPLINT (Left)    Specimen(s):  * No orders in the log *    Estimated Blood Loss:   30 mL      Anesthesia Type:   IV Sedation with Anesthesia    Operative Indications: The patient has a history of Carpal Tunnel Syndrome  left and Thumb CMC Arthritis  left that was recalcitrant to conservative management  The decision was made to bring the patient to the operating room for Endoscopic Carpal Tunnel Release  left and Thumb Interpositional Arthroplasty [place]) with FCR transfer  left  Risks of the procedure were explained which include, but are not limited to bleeding; infection; damage to nerves, arteries,veins, tendons; scar; pain; need for reoperation; failure to give desired result; and risks of anaesthesia  All questions were answered to satisfaction and they were willing to proceed  Operative Findings:  Left thumb cmc arthritis, carpal tunnel syndrome    Complications:   None    Procedure and Technique:  After the patient, site, and procedure were identified, the patient was brought into the operating room in a supine position  Regional and general anaesthesia were provided  A well padded tourniquet was applied to the extremity, set at 250 mmHg    The  left upper extremity was then prepped and drapped in a normal, sterile, orthopedic fashion  After reconfirmation of the patient, site, and surgical procedure, which was agreed upon by the entire surgical team, attention was turned to the left wrist   The sites of the proximal and distal incisions were marked  The fantasma of the proximal incision was placed horizontally at the midline of the wrist   The distal incision fantasma was longitudinal extending distally from the point of intersection of the line between the long finger and ring finger and the line along the distal border of the fully abducted thumb  The proximal incision was performed  Subcutaneous tissues were dissected  Then the transverse volar antebrachial fascia was perforated with a scalpel  The edges of the skin incision where retracted and the forearm fascia was incised for approximately 1 5 cm proximally with care taken to identify and protect the median nerve  Retractors were used to inspect the transverse carpal ligament distally  A curved Hooks dissector was used to glide under the transverse carpal ligament and superficial to the median nerve with confirmation via the washboard feeling  Then the curved Hooks was pushed into the palm toward the distal incision site  When the location of the distal skin fantasma was adequate, the distal incision was made  Then with retraction of the skin, further dissection and perforation of the palmar fascia was performed with the use of tenotomy scissors  The curved Hooks was guided from proximal to distal out the distal incisions without any twisting to allow for dilation of the tract  The curved Hooks was removed, and the cannula for the camera was inserted along the same tract, making sure to keep the alignment post on the cannula perpendicular to the plane of the hand without twisting  Then while keeping the wrist in extension, and holding the cannula of the camera in place, the wrist was placed on the hyperextension board    The scope was inserted distally, and a cotton-tip applicator was used proximally to clean the tract as well as the scope  A curved cutting knife was introduced from proximal to distal while keeping visualization with the use of the camera  Without twisting of the canula, the knife was used to cut the transverse carpal ligament completely, making sure there were no remnant fibers  Then after this was accomplished, the hand was removed from the extension block  Three maneuvers were used to confirm the full release of the transverse carpal ligament  First, the ease of twisting the trocar of the camera confirmed the release of the ligament  Second, the curved Hooks was introduced to make sure there were no remnant fibers that could be felt palmarly  Third, the scope was introduced again to visualize that the whole ligament was released proximally to distally  Additional confirmation of full release included retraction and inspection in the distal and proximal incisions to make sure there were no remnant fibers distally or proximally respectively  After the patient, site, and procedure were once again identified, attention was turned to the left thumb  A curvilinear incision was made at the junction of the glaborous and nonglaborous skin extending toward the flexor carpi radialis tendon  Care was taken to protect the superficial sensory branch of the radial nerve, the radial artery, the median nerve, the palmar cutaneous branch of the median nerve, the flexor carpi radialis tendon, the abductor pollicis longus tendon and the extensor pollicis brevis tendon  The thenar muscles were elevated off of the thumb metacarpal and an arthrotomy was done at the trapeziometacarpal and scaphotrapezial joint  The trapezium was removed in its entirety    Inspection revealed stage 3 (partial thickness loss of the articular cartilage) arthritis at the level of the metacarpal base, stage 1 (minimal fraying of the articular surface) arthritis at the distal pole of the scaphoid, and inspection of the scaphotrapezoid joint revealed stage 1 (minimal fraying of the articular surface) arthritis  Through a second incision, using a Overwolf tendon retriever, the  FCR tendon was harvested toward its insertion  The thumb was held in a reduced position and the tendon was woven through the APL at its insertion into the thumb metacarpal and secured with Ethibond suture  A figure of 8 wrap was then performed around the remainder of the FCR and APL tendons and secured with Ethibond suture  Gelfoam was placed in the remainder of the hole  At the completion of the procedure, hemostasis was obtained with cautery and direct pressure  The wounds were copiously irrigated with sterile solution  The wounds were closed with Vicryl and Prolene  Sterile dressings were applied, including Xeroform, gauze, tweeners, webril, ACE and Thumb Spica Splint  Please note, all sponge, needle, and instrument counts were correct prior to closure  Loupe magnification was utilized  The patient tolerated the procedure well       I was present for the entire procedure    Patient Disposition:  PACU , hemodynamically stable and extubated and stable    SIGNATURE: Kenia Kulkarni MD  DATE: 03/26/19  TIME: 11:52 AM

## 2019-03-26 NOTE — H&P
H&P Exam - Orthopedics   Mita Cotto 79 y o  female MRN: [id number]  Unit/Bed#: [facility] 02    Assessment/Plan   Assessment:  Left carpal tunnel syndrome and left thumb CMC joint osteoarthritis  Plan:  Left endoscopic carpal tunnel release and left thumb CMC joint interposition arthroplasty with flexor carpi radialis tendon transfer    History of Present Illness   HPI:  Mita Cotto is a 79 y o  y o  female who presents with left thumb CMC joint osteoarthritis and pain that was failure of conservative management worse with activities and pressure  Patient also with left hand numbness and tingling with a history of carpal tunnel syndrome      Historical Information  Review Of Systems:   · Skin: Normal  · Neuro: See HPI  · Musculoskeletal: See HPI  · 14 point review of systems negative except as stated above     Past Medical History:   Past Medical History:   Diagnosis Date    Cancer ([address] )     Disease of thyroid gland     GERD (gastroesophageal reflux disease)     PONV (postoperative nausea and vomiting)        Past Surgical History:   Past Surgical History:   Procedure Laterality Date    CHOLECYSTECTOMY      COLONOSCOPY      GALLBLADDER SURGERY      GASTRIC RESTRICTION SURGERY      HERNIA REPAIR      HYSTERECTOMY      TUMOR REMOVAL      arm       Family History:  Family history reviewed and non-contributory  Family History   Problem Relation Age of Onset    Heart attack Mother     Asthma Mother     Heart attack Father        Social History:  Social History     Socioeconomic History    Marital status: /Civil Union     Spouse name: None    Number of children: None    Years of education: None    Highest education level: None   Occupational History    None   Social Needs    Financial resource strain: None    Food insecurity:     Worry: None     Inability: None    Transportation needs:     Medical: None     Non-medical: None   Tobacco Use    Smoking status: Former Smoker    Smokeless tobacco: Never Used   Substance and Sexual Activity    Alcohol use: Yes     Frequency: Monthly or less    Drug use: None    Sexual activity: None   Lifestyle    Physical activity:     Days per week: None     Minutes per session: None    Stress: None   Relationships    Social connections:     Talks on phone: None     Gets together: None     Attends Sikh service: None     Active member of club or organization: None     Attends meetings of clubs or organizations: None     Relationship status: None    Intimate partner violence:     Fear of current or ex partner: None     Emotionally abused: None     Physically abused: None     Forced sexual activity: None   Other Topics Concern    None   Social History Narrative    None       Allergies: Allergies   Allergen Reactions    Morphine Hcl  [Morphine] Itching           Labs:  0   Lab Value Date/Time    HCT 43 0 02/27/2019 0726    HCT 40 8 05/11/2016 0805    HCT 43 7 03/04/2015 0846    HCT 41 6 02/18/2015 0857    HCT 41 0 01/13/2014 1453    HGB 13 6 02/27/2019 0726    HGB 13 7 05/11/2016 0805    HGB 14 5 03/04/2015 0846    HGB 13 6 02/18/2015 0857    HGB 13 7 01/13/2014 1453    INR 1 12 01/13/2014 1453    WBC 7 42 02/27/2019 0726    WBC 5 94 05/11/2016 0805    WBC 6 59 03/04/2015 0846    WBC 5 41 02/18/2015 0857    WBC 7 60 01/13/2014 1453       Meds:    Current Facility-Administered Medications:     ceFAZolin (ANCEF) IVPB (premix) 2,000 mg, 2,000 mg, Intravenous, Once, Kirti Monzon MD    lactated ringers infusion, 125 mL/hr, Intravenous, Continuous, Annette Dimas MD, Last Rate: 125 mL/hr at 03/26/19 0836, 125 mL/hr at 03/26/19 0836    lidocaine-epinephrine (XYLOCAINE/EPINEPHRINE) 1 %-1:100,000 20 mL, sodium bicarbonate 2 mEq infiltration, , Infiltration, Once, Kirti Monzon MD    Blood Culture:   No results found for: BLOODCX    Wound Culture:   No results found for: WOUNDCULT    Ins and Outs:  No intake/output data recorded              Physical Exam  BP 132/67   Pulse (!) 50   Temp 99 °F (37 2 °C) (Tympanic Core)   Resp 18   SpO2 99%   Gen: Alert and oriented to person, place, time  HEENT: EOMI, eyes clear, moist mucus membranes, hearing intact  Respiratory: Bilateral chest rise  No audible wheezing found  Cardiovascular: Regular Rate and Rhythm  Abdomen: soft nontender/nondistended  Ortho Exam:  Tenderness to palpation left thumb CMC joint positive shock, grind, and circumduction test   Patient with no significant atrophy noted  Patient with positive Tinel's, carpal tunnel compression test localized to the left wrist   Neuro Exam:  Decreased sensation median nerve distribution  Neurologically intact ulnar and radial nerve distribution      Lab Results: Reviewed  Imaging: Reviewed

## 2019-03-26 NOTE — DISCHARGE INSTRUCTIONS
Post Operative Instructions    You have had surgery on your arm today, please read and follow the information below:  · Elevate your hand above your elbow during the next 24-48 hours to help with swelling  · Place your hand and arm over your head with motion at your shoulder three times a day  · Do not apply any cream/ointment/oil to your incisions including antibiotics  · Do not soak your hands in standing water (dishwater, tubs, Jacuzzi's, pools, etc ) until given permission (typically 2-3 weeks after injury)    Call the office if you notice any:  · Increased numbness or tingling of your hand or fingers that is not relieved with elevation  · Increasing pain that is not controlled with medication  · Difficulty chewing, breathing, swallowing  · Chest pains or shortness of breath  · Fever over 101 4 degrees  Bandage: Do NOT remove bandage until follow-up appointment  Motion: Move fingers into a fist 5 times a day, DO NOT move any splinted fingers  Weight bearing status: The operated extremity should be non-weight bearing until further notice  Ice: Ice for 10 minutes every hour as needed for swelling x 24 hours  Sling: Sling for comfort for 2-3 days  Pain medication:   Naproxen 220 mg two times a day   Tylenol Extended Release 650 mg every 8 hours  Norco/Hydrocodone one tab every 6 hours AS NEEDED for pain     Follow-up Appointment: 7-10 days  Please call the office if you have any questions or concerns regarding your post-operative care

## 2019-03-26 NOTE — ANESTHESIA POSTPROCEDURE EVALUATION
Post-Op Assessment Note    CV Status:  Stable    Pain management: adequate     Mental Status:  Alert and awake   Hydration Status:  Euvolemic   PONV Controlled:  Controlled   Airway Patency:  Patent   Post Op Vitals Reviewed: Yes      Staff: CRNA     Post-op block assessment: secured with tape, catheter intact and sterile dressing applied        /71 (03/26/19 1222)    Temp (!) 97 2 °F (36 2 °C) (03/26/19 1222)    Pulse 65 (03/26/19 1222)   Resp 14 (03/26/19 1222)    SpO2 97 % (03/26/19 1222)

## 2019-03-26 NOTE — ANESTHESIA PREPROCEDURE EVALUATION
Review of Systems/Medical History  Patient summary reviewed    History of anesthetic complications PONV    Cardiovascular  Negative cardio ROS    Pulmonary  Negative pulmonary ROS        GI/Hepatic  Negative GI/hepatic ROS   GERD ,        Negative  ROS        Endo/Other  Negative endo/other ROS History of thyroid disease , hypothyroidism,      GYN  Negative gynecology ROS          Hematology  Negative hematology ROS      Musculoskeletal  Negative musculoskeletal ROS   Arthritis     Neurology  Negative neurology ROS      Psychology   Negative psychology ROS              Physical Exam    Airway    Mallampati score: II  TM Distance: >3 FB  Neck ROM: full     Dental   No notable dental hx     Cardiovascular  Comment: Negative ROS,     Pulmonary      Other Findings        Anesthesia Plan  ASA Score- 2     Anesthesia Type- regional and general with ASA Monitors  Additional Monitors:   Airway Plan: LMA  Comment: Patient seen and examined, history reviewed  Patient to be done under general anesthesia with LMA and routine monitors  IC block for post op pain  Risks discussed with the patient, consent obtained        Plan Factors-    Induction- intravenous  Postoperative Plan-     Informed Consent- Anesthetic plan and risks discussed with patient  I personally reviewed this patient with the CRNA  Discussed and agreed on the Anesthesia Plan with the CRNA  Karrie Nissen

## 2019-04-05 ENCOUNTER — OFFICE VISIT (OUTPATIENT)
Dept: OBGYN CLINIC | Facility: HOSPITAL | Age: 69
End: 2019-04-05

## 2019-04-05 VITALS
HEIGHT: 62 IN | DIASTOLIC BLOOD PRESSURE: 78 MMHG | WEIGHT: 211 LBS | HEART RATE: 53 BPM | BODY MASS INDEX: 38.83 KG/M2 | SYSTOLIC BLOOD PRESSURE: 123 MMHG

## 2019-04-05 DIAGNOSIS — Z47.89 AFTERCARE FOLLOWING SURGERY OF THE MUSCULOSKELETAL SYSTEM: ICD-10-CM

## 2019-04-05 DIAGNOSIS — M19.032 CMC DJD(CARPOMETACARPAL DEGENERATIVE JOINT DISEASE), LOCALIZED PRIMARY, LEFT: ICD-10-CM

## 2019-04-05 DIAGNOSIS — M18.12 ARTHRITIS OF CARPOMETACARPAL (CMC) JOINT OF LEFT THUMB: Primary | ICD-10-CM

## 2019-04-05 DIAGNOSIS — G56.02 CARPAL TUNNEL SYNDROME ON LEFT: ICD-10-CM

## 2019-04-05 PROCEDURE — 99024 POSTOP FOLLOW-UP VISIT: CPT | Performed by: ORTHOPAEDIC SURGERY

## 2019-04-10 ENCOUNTER — EVALUATION (OUTPATIENT)
Dept: OCCUPATIONAL THERAPY | Facility: MEDICAL CENTER | Age: 69
End: 2019-04-10
Payer: COMMERCIAL

## 2019-04-10 DIAGNOSIS — M18.12 ARTHRITIS OF CARPOMETACARPAL (CMC) JOINT OF LEFT THUMB: ICD-10-CM

## 2019-04-10 DIAGNOSIS — M25.542 PAIN IN THUMB JOINT WITH MOVEMENT OF LEFT HAND: Primary | ICD-10-CM

## 2019-04-10 PROCEDURE — 97110 THERAPEUTIC EXERCISES: CPT | Performed by: OCCUPATIONAL THERAPIST

## 2019-04-10 PROCEDURE — 97140 MANUAL THERAPY 1/> REGIONS: CPT | Performed by: OCCUPATIONAL THERAPIST

## 2019-04-10 PROCEDURE — 97165 OT EVAL LOW COMPLEX 30 MIN: CPT | Performed by: OCCUPATIONAL THERAPIST

## 2019-04-15 ENCOUNTER — OFFICE VISIT (OUTPATIENT)
Dept: OCCUPATIONAL THERAPY | Facility: MEDICAL CENTER | Age: 69
End: 2019-04-15
Payer: COMMERCIAL

## 2019-04-15 DIAGNOSIS — M18.12 ARTHRITIS OF CARPOMETACARPAL (CMC) JOINT OF LEFT THUMB: Primary | ICD-10-CM

## 2019-04-15 DIAGNOSIS — M25.542 PAIN IN THUMB JOINT WITH MOVEMENT OF LEFT HAND: ICD-10-CM

## 2019-04-15 PROCEDURE — 97530 THERAPEUTIC ACTIVITIES: CPT | Performed by: OCCUPATIONAL THERAPIST

## 2019-04-15 PROCEDURE — 97110 THERAPEUTIC EXERCISES: CPT | Performed by: OCCUPATIONAL THERAPIST

## 2019-04-15 PROCEDURE — 97140 MANUAL THERAPY 1/> REGIONS: CPT | Performed by: OCCUPATIONAL THERAPIST

## 2019-04-17 ENCOUNTER — OFFICE VISIT (OUTPATIENT)
Dept: OCCUPATIONAL THERAPY | Facility: MEDICAL CENTER | Age: 69
End: 2019-04-17
Payer: COMMERCIAL

## 2019-04-17 DIAGNOSIS — M18.12 ARTHRITIS OF CARPOMETACARPAL (CMC) JOINT OF LEFT THUMB: Primary | ICD-10-CM

## 2019-04-17 DIAGNOSIS — M25.542 PAIN IN THUMB JOINT WITH MOVEMENT OF LEFT HAND: ICD-10-CM

## 2019-04-17 PROCEDURE — 97140 MANUAL THERAPY 1/> REGIONS: CPT | Performed by: OCCUPATIONAL THERAPIST

## 2019-04-17 PROCEDURE — 97110 THERAPEUTIC EXERCISES: CPT | Performed by: OCCUPATIONAL THERAPIST

## 2019-04-22 ENCOUNTER — OFFICE VISIT (OUTPATIENT)
Dept: OCCUPATIONAL THERAPY | Facility: MEDICAL CENTER | Age: 69
End: 2019-04-22
Payer: COMMERCIAL

## 2019-04-22 DIAGNOSIS — M18.12 ARTHRITIS OF CARPOMETACARPAL (CMC) JOINT OF LEFT THUMB: Primary | ICD-10-CM

## 2019-04-22 DIAGNOSIS — M25.542 PAIN IN THUMB JOINT WITH MOVEMENT OF LEFT HAND: ICD-10-CM

## 2019-04-22 PROCEDURE — 97140 MANUAL THERAPY 1/> REGIONS: CPT | Performed by: OCCUPATIONAL THERAPIST

## 2019-04-22 PROCEDURE — 97110 THERAPEUTIC EXERCISES: CPT | Performed by: OCCUPATIONAL THERAPIST

## 2019-04-24 ENCOUNTER — APPOINTMENT (OUTPATIENT)
Dept: OCCUPATIONAL THERAPY | Facility: MEDICAL CENTER | Age: 69
End: 2019-04-24
Payer: COMMERCIAL

## 2019-04-25 ENCOUNTER — OFFICE VISIT (OUTPATIENT)
Dept: OCCUPATIONAL THERAPY | Facility: MEDICAL CENTER | Age: 69
End: 2019-04-25
Payer: COMMERCIAL

## 2019-04-25 DIAGNOSIS — M18.12 ARTHRITIS OF CARPOMETACARPAL (CMC) JOINT OF LEFT THUMB: Primary | ICD-10-CM

## 2019-04-25 DIAGNOSIS — M25.542 PAIN IN THUMB JOINT WITH MOVEMENT OF LEFT HAND: ICD-10-CM

## 2019-04-25 PROCEDURE — 97140 MANUAL THERAPY 1/> REGIONS: CPT

## 2019-04-25 PROCEDURE — 97110 THERAPEUTIC EXERCISES: CPT

## 2019-04-29 ENCOUNTER — OFFICE VISIT (OUTPATIENT)
Dept: OCCUPATIONAL THERAPY | Facility: MEDICAL CENTER | Age: 69
End: 2019-04-29
Payer: COMMERCIAL

## 2019-04-29 DIAGNOSIS — M25.542 PAIN IN THUMB JOINT WITH MOVEMENT OF LEFT HAND: ICD-10-CM

## 2019-04-29 DIAGNOSIS — M18.12 ARTHRITIS OF CARPOMETACARPAL (CMC) JOINT OF LEFT THUMB: Primary | ICD-10-CM

## 2019-04-29 PROCEDURE — 97530 THERAPEUTIC ACTIVITIES: CPT | Performed by: OCCUPATIONAL THERAPIST

## 2019-04-29 PROCEDURE — 97140 MANUAL THERAPY 1/> REGIONS: CPT | Performed by: OCCUPATIONAL THERAPIST

## 2019-04-29 PROCEDURE — 97110 THERAPEUTIC EXERCISES: CPT | Performed by: OCCUPATIONAL THERAPIST

## 2019-05-01 ENCOUNTER — OFFICE VISIT (OUTPATIENT)
Dept: OCCUPATIONAL THERAPY | Facility: MEDICAL CENTER | Age: 69
End: 2019-05-01
Payer: COMMERCIAL

## 2019-05-01 DIAGNOSIS — M18.12 ARTHRITIS OF CARPOMETACARPAL (CMC) JOINT OF LEFT THUMB: Primary | ICD-10-CM

## 2019-05-01 DIAGNOSIS — M25.542 PAIN IN THUMB JOINT WITH MOVEMENT OF LEFT HAND: ICD-10-CM

## 2019-05-01 PROCEDURE — 97110 THERAPEUTIC EXERCISES: CPT | Performed by: OCCUPATIONAL THERAPIST

## 2019-05-01 PROCEDURE — 97140 MANUAL THERAPY 1/> REGIONS: CPT | Performed by: OCCUPATIONAL THERAPIST

## 2019-05-06 ENCOUNTER — OFFICE VISIT (OUTPATIENT)
Dept: OCCUPATIONAL THERAPY | Facility: MEDICAL CENTER | Age: 69
End: 2019-05-06
Payer: COMMERCIAL

## 2019-05-06 DIAGNOSIS — M18.12 ARTHRITIS OF CARPOMETACARPAL (CMC) JOINT OF LEFT THUMB: Primary | ICD-10-CM

## 2019-05-06 PROCEDURE — 97140 MANUAL THERAPY 1/> REGIONS: CPT | Performed by: OCCUPATIONAL THERAPIST

## 2019-05-06 PROCEDURE — 97110 THERAPEUTIC EXERCISES: CPT | Performed by: OCCUPATIONAL THERAPIST

## 2019-05-08 ENCOUNTER — OFFICE VISIT (OUTPATIENT)
Dept: OCCUPATIONAL THERAPY | Facility: MEDICAL CENTER | Age: 69
End: 2019-05-08
Payer: COMMERCIAL

## 2019-05-08 DIAGNOSIS — M18.12 ARTHRITIS OF CARPOMETACARPAL (CMC) JOINT OF LEFT THUMB: Primary | ICD-10-CM

## 2019-05-08 DIAGNOSIS — M25.542 PAIN IN THUMB JOINT WITH MOVEMENT OF LEFT HAND: ICD-10-CM

## 2019-05-08 PROCEDURE — 97110 THERAPEUTIC EXERCISES: CPT | Performed by: OCCUPATIONAL THERAPIST

## 2019-05-08 PROCEDURE — 97140 MANUAL THERAPY 1/> REGIONS: CPT | Performed by: OCCUPATIONAL THERAPIST

## 2019-05-13 ENCOUNTER — OFFICE VISIT (OUTPATIENT)
Dept: OCCUPATIONAL THERAPY | Facility: MEDICAL CENTER | Age: 69
End: 2019-05-13
Payer: COMMERCIAL

## 2019-05-13 DIAGNOSIS — Z47.89 AFTERCARE FOLLOWING SURGERY OF THE MUSCULOSKELETAL SYSTEM: Primary | ICD-10-CM

## 2019-05-13 DIAGNOSIS — M25.542 PAIN IN THUMB JOINT WITH MOVEMENT OF LEFT HAND: ICD-10-CM

## 2019-05-13 PROCEDURE — 97110 THERAPEUTIC EXERCISES: CPT | Performed by: OCCUPATIONAL THERAPIST

## 2019-05-15 ENCOUNTER — OFFICE VISIT (OUTPATIENT)
Dept: OCCUPATIONAL THERAPY | Facility: MEDICAL CENTER | Age: 69
End: 2019-05-15
Payer: COMMERCIAL

## 2019-05-15 DIAGNOSIS — Z47.89 AFTERCARE FOLLOWING SURGERY OF THE MUSCULOSKELETAL SYSTEM: Primary | ICD-10-CM

## 2019-05-15 DIAGNOSIS — M18.12 ARTHRITIS OF CARPOMETACARPAL (CMC) JOINT OF LEFT THUMB: ICD-10-CM

## 2019-05-15 DIAGNOSIS — M25.542 PAIN IN THUMB JOINT WITH MOVEMENT OF LEFT HAND: ICD-10-CM

## 2019-05-15 PROCEDURE — 97140 MANUAL THERAPY 1/> REGIONS: CPT | Performed by: OCCUPATIONAL THERAPIST

## 2019-05-15 PROCEDURE — 97110 THERAPEUTIC EXERCISES: CPT | Performed by: OCCUPATIONAL THERAPIST

## 2019-05-17 ENCOUNTER — OFFICE VISIT (OUTPATIENT)
Dept: OBGYN CLINIC | Facility: HOSPITAL | Age: 69
End: 2019-05-17

## 2019-05-17 VITALS
WEIGHT: 213.4 LBS | HEIGHT: 62 IN | SYSTOLIC BLOOD PRESSURE: 134 MMHG | HEART RATE: 61 BPM | BODY MASS INDEX: 39.27 KG/M2 | DIASTOLIC BLOOD PRESSURE: 73 MMHG

## 2019-05-17 DIAGNOSIS — Z47.89 AFTERCARE FOLLOWING SURGERY OF THE MUSCULOSKELETAL SYSTEM: Primary | ICD-10-CM

## 2019-05-17 PROCEDURE — 99024 POSTOP FOLLOW-UP VISIT: CPT | Performed by: ORTHOPAEDIC SURGERY

## 2019-05-20 ENCOUNTER — OFFICE VISIT (OUTPATIENT)
Dept: OCCUPATIONAL THERAPY | Facility: MEDICAL CENTER | Age: 69
End: 2019-05-20
Payer: COMMERCIAL

## 2019-05-20 DIAGNOSIS — Z47.89 AFTERCARE FOLLOWING SURGERY OF THE MUSCULOSKELETAL SYSTEM: Primary | ICD-10-CM

## 2019-05-20 DIAGNOSIS — M18.12 ARTHRITIS OF CARPOMETACARPAL (CMC) JOINT OF LEFT THUMB: ICD-10-CM

## 2019-05-20 DIAGNOSIS — M25.542 PAIN IN THUMB JOINT WITH MOVEMENT OF LEFT HAND: ICD-10-CM

## 2019-05-20 PROCEDURE — 97110 THERAPEUTIC EXERCISES: CPT | Performed by: OCCUPATIONAL THERAPIST

## 2019-05-22 ENCOUNTER — OFFICE VISIT (OUTPATIENT)
Dept: OCCUPATIONAL THERAPY | Facility: MEDICAL CENTER | Age: 69
End: 2019-05-22
Payer: COMMERCIAL

## 2019-05-22 DIAGNOSIS — M25.542 PAIN IN THUMB JOINT WITH MOVEMENT OF LEFT HAND: ICD-10-CM

## 2019-05-22 DIAGNOSIS — Z47.89 AFTERCARE FOLLOWING SURGERY OF THE MUSCULOSKELETAL SYSTEM: Primary | ICD-10-CM

## 2019-05-22 PROCEDURE — 97110 THERAPEUTIC EXERCISES: CPT | Performed by: OCCUPATIONAL THERAPIST

## 2019-05-29 ENCOUNTER — OFFICE VISIT (OUTPATIENT)
Dept: OCCUPATIONAL THERAPY | Facility: MEDICAL CENTER | Age: 69
End: 2019-05-29
Payer: COMMERCIAL

## 2019-05-29 DIAGNOSIS — M25.542 PAIN IN THUMB JOINT WITH MOVEMENT OF LEFT HAND: ICD-10-CM

## 2019-05-29 DIAGNOSIS — M18.12 ARTHRITIS OF CARPOMETACARPAL (CMC) JOINT OF LEFT THUMB: ICD-10-CM

## 2019-05-29 DIAGNOSIS — Z47.89 AFTERCARE FOLLOWING SURGERY OF THE MUSCULOSKELETAL SYSTEM: Primary | ICD-10-CM

## 2019-05-29 PROCEDURE — 97110 THERAPEUTIC EXERCISES: CPT | Performed by: OCCUPATIONAL THERAPIST

## 2019-05-29 PROCEDURE — 97140 MANUAL THERAPY 1/> REGIONS: CPT | Performed by: OCCUPATIONAL THERAPIST

## 2019-05-30 ENCOUNTER — OFFICE VISIT (OUTPATIENT)
Dept: OCCUPATIONAL THERAPY | Facility: MEDICAL CENTER | Age: 69
End: 2019-05-30
Payer: COMMERCIAL

## 2019-05-30 DIAGNOSIS — Z47.89 AFTERCARE FOLLOWING SURGERY OF THE MUSCULOSKELETAL SYSTEM: Primary | ICD-10-CM

## 2019-05-30 DIAGNOSIS — M25.542 PAIN IN THUMB JOINT WITH MOVEMENT OF LEFT HAND: ICD-10-CM

## 2019-05-30 DIAGNOSIS — M18.12 ARTHRITIS OF CARPOMETACARPAL (CMC) JOINT OF LEFT THUMB: ICD-10-CM

## 2019-05-30 PROCEDURE — 97140 MANUAL THERAPY 1/> REGIONS: CPT

## 2019-05-30 PROCEDURE — 97530 THERAPEUTIC ACTIVITIES: CPT

## 2019-05-31 ENCOUNTER — APPOINTMENT (OUTPATIENT)
Dept: OCCUPATIONAL THERAPY | Facility: MEDICAL CENTER | Age: 69
End: 2019-05-31
Payer: COMMERCIAL

## 2019-06-03 ENCOUNTER — OFFICE VISIT (OUTPATIENT)
Dept: OCCUPATIONAL THERAPY | Facility: MEDICAL CENTER | Age: 69
End: 2019-06-03
Payer: COMMERCIAL

## 2019-06-03 DIAGNOSIS — M18.12 ARTHRITIS OF CARPOMETACARPAL (CMC) JOINT OF LEFT THUMB: ICD-10-CM

## 2019-06-03 DIAGNOSIS — M25.542 PAIN IN THUMB JOINT WITH MOVEMENT OF LEFT HAND: ICD-10-CM

## 2019-06-03 DIAGNOSIS — Z47.89 AFTERCARE FOLLOWING SURGERY OF THE MUSCULOSKELETAL SYSTEM: Primary | ICD-10-CM

## 2019-06-03 PROCEDURE — 97140 MANUAL THERAPY 1/> REGIONS: CPT | Performed by: OCCUPATIONAL THERAPIST

## 2019-06-03 PROCEDURE — 97110 THERAPEUTIC EXERCISES: CPT | Performed by: OCCUPATIONAL THERAPIST

## 2019-06-05 ENCOUNTER — APPOINTMENT (OUTPATIENT)
Dept: OCCUPATIONAL THERAPY | Facility: MEDICAL CENTER | Age: 69
End: 2019-06-05
Payer: COMMERCIAL

## 2019-06-06 ENCOUNTER — APPOINTMENT (OUTPATIENT)
Dept: OCCUPATIONAL THERAPY | Facility: MEDICAL CENTER | Age: 69
End: 2019-06-06
Payer: COMMERCIAL

## 2019-06-10 ENCOUNTER — OFFICE VISIT (OUTPATIENT)
Dept: OCCUPATIONAL THERAPY | Facility: MEDICAL CENTER | Age: 69
End: 2019-06-10
Payer: COMMERCIAL

## 2019-06-10 ENCOUNTER — TELEPHONE (OUTPATIENT)
Dept: OBGYN CLINIC | Facility: HOSPITAL | Age: 69
End: 2019-06-10

## 2019-06-10 DIAGNOSIS — M25.542 PAIN IN THUMB JOINT WITH MOVEMENT OF LEFT HAND: ICD-10-CM

## 2019-06-10 DIAGNOSIS — Z47.89 AFTERCARE FOLLOWING SURGERY OF THE MUSCULOSKELETAL SYSTEM: Primary | ICD-10-CM

## 2019-06-10 PROCEDURE — 97140 MANUAL THERAPY 1/> REGIONS: CPT

## 2019-06-10 PROCEDURE — 97110 THERAPEUTIC EXERCISES: CPT

## 2019-06-12 ENCOUNTER — APPOINTMENT (OUTPATIENT)
Dept: OCCUPATIONAL THERAPY | Facility: MEDICAL CENTER | Age: 69
End: 2019-06-12
Payer: COMMERCIAL

## 2019-06-13 ENCOUNTER — OFFICE VISIT (OUTPATIENT)
Dept: OCCUPATIONAL THERAPY | Facility: MEDICAL CENTER | Age: 69
End: 2019-06-13
Payer: COMMERCIAL

## 2019-06-13 DIAGNOSIS — Z47.89 AFTERCARE FOLLOWING SURGERY OF THE MUSCULOSKELETAL SYSTEM: Primary | ICD-10-CM

## 2019-06-13 DIAGNOSIS — M18.12 ARTHRITIS OF CARPOMETACARPAL (CMC) JOINT OF LEFT THUMB: ICD-10-CM

## 2019-06-13 DIAGNOSIS — M25.542 PAIN IN THUMB JOINT WITH MOVEMENT OF LEFT HAND: ICD-10-CM

## 2019-06-13 PROCEDURE — 97110 THERAPEUTIC EXERCISES: CPT

## 2019-06-13 PROCEDURE — 97140 MANUAL THERAPY 1/> REGIONS: CPT

## 2019-06-17 ENCOUNTER — OFFICE VISIT (OUTPATIENT)
Dept: OCCUPATIONAL THERAPY | Facility: MEDICAL CENTER | Age: 69
End: 2019-06-17
Payer: COMMERCIAL

## 2019-06-17 DIAGNOSIS — M25.542 PAIN IN THUMB JOINT WITH MOVEMENT OF LEFT HAND: ICD-10-CM

## 2019-06-17 DIAGNOSIS — M18.12 ARTHRITIS OF CARPOMETACARPAL (CMC) JOINT OF LEFT THUMB: ICD-10-CM

## 2019-06-17 DIAGNOSIS — Z47.89 AFTERCARE FOLLOWING SURGERY OF THE MUSCULOSKELETAL SYSTEM: Primary | ICD-10-CM

## 2019-06-17 PROCEDURE — 97110 THERAPEUTIC EXERCISES: CPT | Performed by: OCCUPATIONAL THERAPIST

## 2019-06-17 PROCEDURE — 97140 MANUAL THERAPY 1/> REGIONS: CPT | Performed by: OCCUPATIONAL THERAPIST

## 2019-06-19 ENCOUNTER — TELEPHONE (OUTPATIENT)
Dept: OBGYN CLINIC | Facility: HOSPITAL | Age: 69
End: 2019-06-19

## 2019-06-19 ENCOUNTER — APPOINTMENT (OUTPATIENT)
Dept: OCCUPATIONAL THERAPY | Facility: MEDICAL CENTER | Age: 69
End: 2019-06-19
Payer: COMMERCIAL

## 2019-06-20 ENCOUNTER — OFFICE VISIT (OUTPATIENT)
Dept: OCCUPATIONAL THERAPY | Facility: MEDICAL CENTER | Age: 69
End: 2019-06-20
Payer: COMMERCIAL

## 2019-06-20 DIAGNOSIS — M25.542 PAIN IN THUMB JOINT WITH MOVEMENT OF LEFT HAND: ICD-10-CM

## 2019-06-20 DIAGNOSIS — M18.12 ARTHRITIS OF CARPOMETACARPAL (CMC) JOINT OF LEFT THUMB: ICD-10-CM

## 2019-06-20 DIAGNOSIS — Z47.89 AFTERCARE FOLLOWING SURGERY OF THE MUSCULOSKELETAL SYSTEM: Primary | ICD-10-CM

## 2019-06-20 PROCEDURE — 97110 THERAPEUTIC EXERCISES: CPT

## 2019-06-20 PROCEDURE — 97140 MANUAL THERAPY 1/> REGIONS: CPT

## 2019-06-20 PROCEDURE — 97150 GROUP THERAPEUTIC PROCEDURES: CPT

## 2019-06-24 ENCOUNTER — EVALUATION (OUTPATIENT)
Dept: OCCUPATIONAL THERAPY | Facility: MEDICAL CENTER | Age: 69
End: 2019-06-24
Payer: COMMERCIAL

## 2019-06-24 DIAGNOSIS — M25.542 PAIN IN THUMB JOINT WITH MOVEMENT OF LEFT HAND: ICD-10-CM

## 2019-06-24 DIAGNOSIS — Z47.89 AFTERCARE FOLLOWING SURGERY OF THE MUSCULOSKELETAL SYSTEM: Primary | ICD-10-CM

## 2019-06-24 PROCEDURE — 97110 THERAPEUTIC EXERCISES: CPT

## 2019-06-27 ENCOUNTER — OFFICE VISIT (OUTPATIENT)
Dept: OCCUPATIONAL THERAPY | Facility: MEDICAL CENTER | Age: 69
End: 2019-06-27
Payer: COMMERCIAL

## 2019-06-27 DIAGNOSIS — M25.542 PAIN IN THUMB JOINT WITH MOVEMENT OF LEFT HAND: ICD-10-CM

## 2019-06-27 DIAGNOSIS — M18.12 ARTHRITIS OF CARPOMETACARPAL (CMC) JOINT OF LEFT THUMB: ICD-10-CM

## 2019-06-27 DIAGNOSIS — Z47.89 AFTERCARE FOLLOWING SURGERY OF THE MUSCULOSKELETAL SYSTEM: Primary | ICD-10-CM

## 2019-06-27 PROCEDURE — 97018 PARAFFIN BATH THERAPY: CPT

## 2019-06-27 PROCEDURE — 97140 MANUAL THERAPY 1/> REGIONS: CPT

## 2019-06-27 PROCEDURE — 97530 THERAPEUTIC ACTIVITIES: CPT

## 2019-06-27 PROCEDURE — 97110 THERAPEUTIC EXERCISES: CPT

## 2019-07-01 ENCOUNTER — OFFICE VISIT (OUTPATIENT)
Dept: OCCUPATIONAL THERAPY | Facility: MEDICAL CENTER | Age: 69
End: 2019-07-01
Payer: COMMERCIAL

## 2019-07-01 DIAGNOSIS — Z47.89 AFTERCARE FOLLOWING SURGERY OF THE MUSCULOSKELETAL SYSTEM: Primary | ICD-10-CM

## 2019-07-01 DIAGNOSIS — M25.542 PAIN IN THUMB JOINT WITH MOVEMENT OF LEFT HAND: ICD-10-CM

## 2019-07-01 PROCEDURE — 97110 THERAPEUTIC EXERCISES: CPT

## 2019-07-01 PROCEDURE — 97530 THERAPEUTIC ACTIVITIES: CPT

## 2019-07-01 PROCEDURE — 97140 MANUAL THERAPY 1/> REGIONS: CPT

## 2019-07-01 NOTE — PROGRESS NOTES
Daily Note     Today's date: 2019  Patient name: Clarita Chaudhari  : 1950  MRN: [id number]  Referring provider: Christy Cali PA-C  Dx:   Encounter Diagnosis     ICD-10-CM    1  Aftercare following surgery of the musculoskeletal system Z47 89    2   Pain in thumb joint with movement of left hand M25 542                   Subjective: "I am just getting better so much faster than before"    Objective: See treatment diary below    Manual  5/20 5/22 5/29 6/10 6/13  6/17  6/20  6/27  7/1     stm 5' 5' 5' 5' 5'  5' 10'  10'  5'     Edema mgt 5' 5'        5'          PROM         3'             IASTM                5'  5'     Scar tissue mgt     3' 3'               splint   Gel pad                        Exercise Diary  7/1 5/22 5/29 6/3 6/10 6/13  6/17  6/20  6/24  6/27   AROM wrist                      Finger to thumb opposition  reviewed                   TGE's  reviewed                   Marbles opposition/transposition                       Grooved peg board                       RFB static    10 x 2                   [place] shooting                       FPL cizer     10 x2                 velcro pull (guillen pinch                       tp salmon: FD & pinches                       velcro blocks guillen pinch   1 set     1 set 1 set 1 set    1 set  1 set  1 set   P/s mid weight  10 x 2 15 x2                 HG level 2    15 x2   15x2      Pegs, red in, level 3 out  pegs in, level 3 out     EDC tennis ball   5xs/each RB  1 set 1 set     1x  1x  1x  1x     RFB P/S Green 20x 10 x2 15 x 2   15x2 15x2        Green 15x   RFB wrist fl/e Green 20x 10 x2 15 x2   15x2 15x2      Green 15x   Pinch Ring  G/G 2x       Y/R 2x R/G    R/G 2x    R/G x2   Isometric C             RTB x10          Thumb flexion  R 3x10                 R 3x10   Gross Grasp  Green 3x10,  Blue x15                 Green 3x10   FPLcizer 30x            EDC Jar 1x, Yellow 15x         Jarv1x         Modalities  7/1 4/25 5/6 5/8 6/10 6/13  6/17  6/20 6/24 6/27    5' 10' 10' 5' 10' 5'  5'  5' 15'      Paraffin                    10                                        Assessment: Tolerated treatment and upgrades well  Good motion today, tolerated upgrades well  Plan: Continue per plan of care

## 2019-07-03 ENCOUNTER — OFFICE VISIT (OUTPATIENT)
Dept: OCCUPATIONAL THERAPY | Facility: MEDICAL CENTER | Age: 69
End: 2019-07-03
Payer: COMMERCIAL

## 2019-07-03 DIAGNOSIS — M18.12 ARTHRITIS OF CARPOMETACARPAL (CMC) JOINT OF LEFT THUMB: ICD-10-CM

## 2019-07-03 DIAGNOSIS — M25.542 PAIN IN THUMB JOINT WITH MOVEMENT OF LEFT HAND: ICD-10-CM

## 2019-07-03 DIAGNOSIS — Z47.89 AFTERCARE FOLLOWING SURGERY OF THE MUSCULOSKELETAL SYSTEM: Primary | ICD-10-CM

## 2019-07-03 PROCEDURE — 97110 THERAPEUTIC EXERCISES: CPT

## 2019-07-03 PROCEDURE — 97530 THERAPEUTIC ACTIVITIES: CPT

## 2019-07-03 NOTE — PROGRESS NOTES
Daily Note     Today's date: 7/3/2019  Patient name: Tressa Moeller  : 1950  MRN: [id number]  Referring provider: Flaco Lopez PA-C  Dx:   Encounter Diagnosis     ICD-10-CM    1  Aftercare following surgery of the musculoskeletal system Z47 89    2  Pain in thumb joint with movement of left hand M25 542    3   Arthritis of carpometacarpal [place]) joint of left thumb M18 12                   Subjective: "It just feels tight today"    Objective: See treatment diary below    Manual  5/20 5/22 5/29 6/10 6/13  6/17  6/20  6/27  7/1  7/3   stm 5' 5' 5' 5' 5'  5' 10'  10'  5'  8'   Edema mgt 5' 5'        5'          PROM         3'             IASTM                5'  5'     Scar tissue mgt     3' 3'               splint   Gel pad                        Exercise Diary  7/1 7/3 5/29 6/3 6/10 6/13  6/17  6/20  6/24  6/27   AROM wrist                      Finger to thumb opposition                     TGE's                     Marbles opposition/transposition                       Grooved peg board                       RFB static                       [place] shooting                       FPL cizer     10 x2                 velcro pull (guillen pinch                       tp salmon: FD & pinches                       velcro blocks guillen pinch   1 set  1 set   1 set 1 set 1 set    1 set  1 set  1 set   P/s mid weight   15 x2                 HG level 2    15 x2   15x2      Pegs, red in, level 3 out  pegs in, level 3 out     EDC tennis ball   5xs/each RB   1 set     1x  1x  1x  1x     RFB P/S Green 20x Green 20x 15 x 2   15x2 15x2        Green 15x   RFB wrist fl/e Green 20x Green 20x 15 x2   15x2 15x2      Green 15x   Pinch Ring  G/G 2x G/G 2x     Y/R 2x R/G    R/G 2x    R/G x2   Isometric C             RTB x10          Thumb flexion  R 3x10  R 3x15               R 3x10   Gross Grasp  Green 3x10,  Blue x15  Green 3x10, Blue 2x15               Green 3x10   FPLcizer 30x 45x           EDC Jar 1x, Yellow 15x Jar 1x Yellow 15x        Jarv1x         Modalities  7/1 7/3 5/6 5/8 6/10 6/13  6/17  6/20  6/24  6/27    5' 15' 10' 5' 10' 5'  5'  5' 15'      Paraffin                    10                                        Assessment: Tolerated treatment and upgrades well  Pain in hypothenar resolving    Plan: Continue per plan of care

## 2019-07-08 ENCOUNTER — OFFICE VISIT (OUTPATIENT)
Dept: OCCUPATIONAL THERAPY | Facility: MEDICAL CENTER | Age: 69
End: 2019-07-08
Payer: COMMERCIAL

## 2019-07-08 DIAGNOSIS — Z47.89 AFTERCARE FOLLOWING SURGERY OF THE MUSCULOSKELETAL SYSTEM: Primary | ICD-10-CM

## 2019-07-08 DIAGNOSIS — M25.542 PAIN IN THUMB JOINT WITH MOVEMENT OF LEFT HAND: ICD-10-CM

## 2019-07-08 DIAGNOSIS — M18.12 ARTHRITIS OF CARPOMETACARPAL (CMC) JOINT OF LEFT THUMB: ICD-10-CM

## 2019-07-08 PROCEDURE — 97110 THERAPEUTIC EXERCISES: CPT

## 2019-07-08 PROCEDURE — 97140 MANUAL THERAPY 1/> REGIONS: CPT

## 2019-07-08 NOTE — PROGRESS NOTES
Daily Note     Today's date: 2019  Patient name: Amrita Fernandez  : 1950  MRN: [id number]  Referring provider: Sheldon Mills PA-C  Dx:   Encounter Diagnosis     ICD-10-CM    1  Aftercare following surgery of the musculoskeletal system Z47 89    2  Pain in thumb joint with movement of left hand M25 542    3   Arthritis of carpometacarpal [place]) joint of left thumb M18 12                   Subjective: "It's achy today"    Objective: See treatment diary below    Manual  7/8 5/22 5/29 6/10 6/13  6/17  6/20  6/27  7/1  7/3   stm 8' 5' 5' 5' 5'  5' 10'  10'  5'  8'   Edema mgt  5'        5'          PROM         3'             IASTM                5'  5'     Scar tissue mgt     3' 3'               splint   Gel pad                        Exercise Diary  7/1 7/3 7/8 6/3 6/10 6/13  6/17  6/20  6/24  6/27   AROM wrist                      Finger to thumb opposition                     TGE's                     Marbles opposition/transposition                       Grooved peg board                       RFB static                       [place] shooting                       FPL cizer                      velcro pull (guillen pinch                       tp salmon: FD & pinches                       velcro blocks guillen pinch   1 set  1 set   1 set 1 set 1 set    1 set  1 set  1 set   P/s mid weight                    HG level 2       15x2      Pegs, red in, level 3 out  pegs in, level 3 out     EDC tennis ball   5xs/each RB        1x  1x  1x  1x     RFB P/S Green 20x Green 20x Green 20x   15x2 15x2        Green 15x   RFB wrist fl/e Green 20x Green 20x Green 20x   15x2 15x2      Green 15x   Pinch Ring  G/G 2x G/G 2x  G/G 2x, Tripod and palmar   Y/R 2x R/G    R/G 2x    R/G x2   Isometric C             RTB x10          Thumb flexion  R 3x10  R 3x15  G3 x10             R 3x10   Gross Grasp  Green 3x10,  Blue x15  Green 3x10, Blue 2x15 Blue 3x15             Green 3x10   FPLcizer 30x 45x 50x          EDC Jar 1x, Yellow 15x Jar 1x Yellow 15x Jar 1x, yellow 15x       Jarv1x   Lifting   15# 3x10                Modalities  7/1 7/3 7/8 5/8 6/10 6/13  6/17  6/20  6/24  6/27    5' 15' 15' 5' 10' 5'  5'  5' 15'      Paraffin                    10                                        Assessment: Tolerated treatment and upgrades well  Beginning work hardening  Plan: Continue per plan of care

## 2019-07-11 ENCOUNTER — OFFICE VISIT (OUTPATIENT)
Dept: OCCUPATIONAL THERAPY | Facility: MEDICAL CENTER | Age: 69
End: 2019-07-11
Payer: COMMERCIAL

## 2019-07-11 DIAGNOSIS — M25.542 PAIN IN THUMB JOINT WITH MOVEMENT OF LEFT HAND: ICD-10-CM

## 2019-07-11 DIAGNOSIS — M18.12 ARTHRITIS OF CARPOMETACARPAL (CMC) JOINT OF LEFT THUMB: ICD-10-CM

## 2019-07-11 DIAGNOSIS — Z47.89 AFTERCARE FOLLOWING SURGERY OF THE MUSCULOSKELETAL SYSTEM: Primary | ICD-10-CM

## 2019-07-11 PROCEDURE — 97530 THERAPEUTIC ACTIVITIES: CPT

## 2019-07-11 PROCEDURE — 97760 ORTHOTIC MGMT&TRAING 1ST ENC: CPT

## 2019-07-11 NOTE — PROGRESS NOTES
Daily Note     Today's date: 2019  Patient name: Amrita Fernandez  : 1950  MRN: [id number]  Referring provider: Sheldon Mills PA-C  Dx:   Encounter Diagnosis     ICD-10-CM    1  Aftercare following surgery of the musculoskeletal system Z47 89    2  Pain in thumb joint with movement of left hand M25 542    3   Arthritis of carpometacarpal [place]) joint of left thumb M18 12                   Subjective: "I feel like i'm doing better"    Objective: See treatment diary below    Manual  7/8 7/11 5/29 6/10 6/13  6/17  6/20  6/27  7/1  7/3   stm 8'  5' 5' 5'  5' 10'  10'  5'  8'   Edema mgt          5'          PROM         3'             IASTM                5'  5'     Scar tissue mgt     3' 3'               splint   Dorsal MP blocking 10'                        Exercise Diary  7/1 7/3 7/8 7/11 6/10 6/13  6/17  6/20  6/24  6/27   AROM wrist                      Finger to thumb opposition                     TGE's                     Marbles opposition/transposition                       Grooved peg board                       RFB static                       [place] shooting                       FPL cizer                      velcro pull (gulilen pinch                       tp salmon: FD & pinches                       velcro blocks guillen pinch   1 set  1 set    1 set 1 set    1 set  1 set  1 set   P/s mid weight                    HG level 2       15x2      Pegs, red in, level 3 out  pegs in, level 3 out     EDC tennis ball   5xs/each RB        1x  1x  1x  1x     RFB P/S Green 20x Green 20x    15x2 15x2        Green 15x   RFB wrist fl/e Green 20x Green 20x    15x2 15x2      Green 15x   Pinch Ring  G/G 2x G/G 2x  G/G 2x, Tripod and palmar  R/G tripod Y/R 2x R/G    R/G 2x    R/G x2   Isometric C             RTB x10          Thumb flexion  R 3x10  R 3x15  G3 x10  Blue 3x10           R 3x10   Gross Grasp  Green 3x10,  Blue x15  Green 3x10, Blue 2x15 Blue 3x15  Black 3x10           Green 3x10   FPLcizer 30x 45x 50x 50x         EDC Jar 1x, Yellow 15x Jar 1x Yellow 15x        Jarv1x   Lifting   15# 3x10 15# 3x10               Modalities  7/1 7/3 7/8 7/11 6/10 6/13  6/17  6/20  6/24  6/27    5' 15' 15' 15' 10' 5'  5'  5' 15'      Paraffin                    10                                        Assessment: fabricated an MP blocking splint that allows 15 degrees flexion to full flexion secondary to a possible volar plate avulsion per MD  She is to wear this at all times with all activities, she notes no pain with any activities with it on  Plan: Continue per plan of care

## 2019-07-15 ENCOUNTER — OFFICE VISIT (OUTPATIENT)
Dept: OCCUPATIONAL THERAPY | Facility: MEDICAL CENTER | Age: 69
End: 2019-07-15
Payer: COMMERCIAL

## 2019-07-15 DIAGNOSIS — M25.542 PAIN IN THUMB JOINT WITH MOVEMENT OF LEFT HAND: Primary | ICD-10-CM

## 2019-07-15 PROCEDURE — 97530 THERAPEUTIC ACTIVITIES: CPT

## 2019-07-15 PROCEDURE — 97110 THERAPEUTIC EXERCISES: CPT

## 2019-07-15 NOTE — PROGRESS NOTES
Daily Note     Today's date: 7/15/2019  Patient name: Aguilar Hartman  : 1950  MRN: [id number]  Referring provider: Dannial Fleischer, PA-C  Dx:   Encounter Diagnosis     ICD-10-CM    1   Pain in thumb joint with movement of left hand M25 542                   Subjective: "I have pain now down here [place] joint dorsal) when I try and hyperextend my thumb"    Objective: See treatment diary below    Manual  7/8 7/11 5/29 6/10 6/13  6/17  6/20  6/27  7/1  7/3   stm 8'  5' 5' 5'  5' 10'  10'  5'  8'   Edema mgt          5'          PROM         3'             IASTM                5'  5'     Scar tissue mgt     3' 3'               splint   Dorsal MP blocking 10'                        Exercise Diary  7/1 7/3 7/8 7/11 6/10 6/13  6/17  6/20  6/24  6/27   AROM wrist                      Finger to thumb opposition                     TGE's                     Marbles opposition/transposition                       Grooved peg board                       RFB static                       [place] shooting                       FPL cizer                      velcro pull (guillen pinch                       tp salmon: FD & pinches                       velcro blocks guillen pinch   1 set  1 set     1 set    1 set  1 set  1 set   P/s mid weight                    HG level 2             Pegs, red in, level 3 out  pegs in, level 3 out     EDC tennis ball   5xs/each RB        1x  1x  1x  1x     RFB P/S Green 20x Green 20x     15x2        Green 15x   RFB wrist fl/e Green 20x Green 20x     15x2      Green 15x   Pinch Ring  G/G 2x G/G 2x  G/G 2x, Tripod and palmar  R/G tripod G/B Tripod R/G    R/G 2x    R/G x2   Isometric C             RTB x10          Thumb flexion  R 3x10  R 3x15  G3 x10  Blue 3x10  Blue 3x10         R 3x10   Gross Grasp  Green 3x10,  Blue x15  Green 3x10, Blue 2x15 Blue 3x15  Black 3x10  Black 3x10         Green 3x10   FPLcizer 30x 45x 50x 50x 65x        EDC Jar 1x, Yellow 15x Jar 1x Yellow 15x        Jarv1x Lifting   15# 3x10 15# 3x10 15# 2x10, 10# overhead 10x              Modalities  7/1 7/3 7/8 7/11 7/15 6/13  6/17  6/20  6/24  6/27    5' 15' 15' 15' 15' 5'  5'  5' 15'      Paraffin                    10                                        Assessment: Readjusted splints for comfort  Increased work-hardening PREs, tolerated well  Plan: Continue per plan of care

## 2019-07-18 ENCOUNTER — EVALUATION (OUTPATIENT)
Dept: OCCUPATIONAL THERAPY | Facility: MEDICAL CENTER | Age: 69
End: 2019-07-18
Payer: COMMERCIAL

## 2019-07-18 DIAGNOSIS — M25.542 PAIN IN THUMB JOINT WITH MOVEMENT OF LEFT HAND: Primary | ICD-10-CM

## 2019-07-18 PROCEDURE — 97110 THERAPEUTIC EXERCISES: CPT

## 2019-07-18 PROCEDURE — 97530 THERAPEUTIC ACTIVITIES: CPT

## 2019-07-18 NOTE — PROGRESS NOTES
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 and is currently not driving  Patient would benefit from skilled OT in order to resume pain free [place] in ADL's, IADL's and work tasks  19:  Patient has made gains in AROM, edema, skin integrity and functional status  She is not lifting weighted items  She is performing light activities  She is starting to wean from her splint  Patient would benefit from cont skilled OT in order to return to work full duty and care for her  who has Alzheirmer's Disease  : Fausto Stevenson has had a slight decline in AROM, however her strength has greatly improved  She notes she does have pain lifting heavier items but overall she feels that lifting is getting easier  Her scar density is continuing to decrease and the proximal part of her scar is no longer adhered  She would benefit from cont  OT in order to continue to increase her strength and decrease her pain to allow her complete her ADL/IADLs without deficit and so she can RTW      : Linda's IP motion has greatly increased  She currently presents with symptoms in line with a volar plate avulsion and she has been placed in a dorsal blocking splint per MD to be worn at all times  Her pinch strength has increased, however her  strength slightly declined   She has begun PREs focused on the tasks at her job to prepare her to RTW  She would benefit from OT focused on work-hardening to allow to her continue to gain strength to allow her to RTW safely  Impairments: abnormal or restricted ROM, impaired physical strength, lacks appropriate home exercise program and pain with function  Understanding of Dx/Px/POC: good   Prognosis: good    Goals  STG:  In 3 weeks  1  Decreased pain to 2-3/10 when performing ADL's  Met  2  Increased ROM 5-15 degrees in order to independently perform light weight IADL's met  3  Improve FOTO score 10%  met    LTG's 8 weeks  1    Patient will report minimal to no pain with all activities  2   Sufficient strength to perform ADL's and IADL's  New STG (6/24):  1  Increase  strength by 5 pounds in 4 weeks NOT MET  2  Increase pinch strength by 2 pounds in 4 weeks PARTIALLY MET  3  Pain with lifting decreased to 1-2/10 in 4 weeks  [facility]    Plan  Patient would benefit from: OT eval, skilled occupational therapy and custom splinting  Planned modality interventions: TENS, thermotherapy: hydrocollator packs, thermotherapy: paraffin bath, ultrasound, fluidotherapy, electrical stimulation/Russian stimulation and cryotherapy  Planned therapy interventions: joint mobilization, manual therapy, patient education, orthotic fitting/training, neuromuscular re-education, coordination, home exercise program, graded exercise, graded activity, functional ROM exercises, therapeutic training, therapeutic exercise, therapeutic activities, stretching and strengthening  Frequency: 2x week  Duration in weeks: 8  Plan of Care beginning date: 7/18/2019  Plan of Care expiration date: 9/18/2019  Treatment plan discussed with: patient        Subjective Evaluation    History of Present Illness  Date of surgery: 3/26/2019  Mechanism of injury: surgery  Mechanism of injury: 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    Then my thumb started swelling and I got a really big lump and then I noticed my left thumb was longer then the right  She then went to doctor who recommended hand surgery and she was referred to hand surgeon Hand surgeon noted she needed surgery following review of x-ray and MRI  Currently, she hasn't been using her left hand    19:  Patient voiced belief that she is 60% improved  Patient notes she has trouble pulling up stretch pants, buttoning 's jeans  She is also not lifting weighted items  Patient can now hold a quarter to a half a cup of coffee  Patient is using fingers to hold items and leaving her thumb out of tasks  Patient notes she can do her hair and put my sweater on  My fingers feel good and they don't even hurt anymore  Recurrent probem    Quality of life: good    Pain  Current pain ratin  At best pain ratin  At worst pain ratin (With extension blocking splint on)  Relieving factors: medications, support and rest  Aggravating factors: lifting  Progression: improved    Social Support  Lives in: multiple-level home  Lives with: spouse    Employment status: not working  Hand dominance: right  Life stress: medium      Diagnostic Tests  X-ray: abnormal  MRI studies: abnormal  Treatments  Previous treatment: immobilization  Patient Goals  Patient goals for therapy: decreased edema, increased strength and decreased pain  Patient goal: to use my hand normally  Objective     Observations     Additional Observation Details  Steri strips at surgical site  Stiff thumb holds in a protective position    19: Wound is healed with slightly raised ; tender surgical scar with mild to moderate adherence      9 hole peg test  R hand :  24 seconds   L hand : 46 seconds    19:  R hand : 19 secs  L hand 24 sec        Active Range of Motion     Left Wrist   Wrist flexion: 50 degrees   Wrist extension: 60 degrees     Left Thumb   Flexion     MP: 35 degrees    DIP: 78 degrees  Palmar Abduction     CMC: 62 degrees  Radial abduction    CMC: 65 degrees    Additional Active Range of Motion Details  Hyperextension of the MP with any extension or over radial abducting the thumb    Strength/Myotome Testing     Left Wrist/Hand      (2nd hand position)     Trial 1: 40    Thumb Strength  Key/Lateral Pinch     Trail 1: 11  Tip/Two-Point Pinch     Trial 1: 9  Palmar/Three-Point Pinch     Trial 1: 9    Right Wrist/Hand      (2nd hand position)     Trial 1: 65    Thumb Strength   Key/Lateral Pinch     Trial 1: 17  Tip/Two-Point Pinch     Trial 1: 13  Palmar/Three-Point Pinch     Trial 1: 11    Swelling     Left Wrist/Hand   Circumference wrist: 17 3 cm    Right Wrist/Hand   Circumference wrist: 17 cm      Flowsheet Rows      Most Recent Value   PT/OT G-Codes   Current Score  66   Projected Score  57          Precautions:   Alpha's procedure: No thumb ADD; universal precautions    Daily Treatment Diary

## 2019-07-18 NOTE — PROGRESS NOTES
Daily Note     Today's date: 2019  Patient name: Coco Gastelum  : 1950  MRN: [id number]  Referring provider: Pb Connell PA-C  Dx:   Encounter Diagnosis     ICD-10-CM    1   Pain in thumb joint with movement of left hand M25 542                   Subjective: "I really think this splint is helping, a lot of my pain is gone    Objective: See treatment diary below    Manual  7/8 7/11 5/29 6/10 6/13  6/17  6/20  6/27  7/1  7/3   stm 8'  5' 5' 5'  5' 10'  10'  5'  8'   Edema mgt          5'          PROM         3'             IASTM                5'  5'     Scar tissue mgt     3' 3'               splint   Dorsal MP blocking 10'                        Exercise Diary  7/1 7/3 7/8 7/11 7/15 7/18  6/17  6/20  6/24  6/27   AROM wrist                      Finger to thumb opposition                     TGE's                     [place] opposition/transposition                       Grooved peg board                       RFB static                       [place] shooting                       FPL cizer                      velcro pull (guillen pinch                       tp salmon: FD & pinches                       velcro blocks guillen pinch   1 set  1 set         1 set  1 set  1 set   P/s mid weight                    HG level 2         Pegs green in level 3 out    Pegs, red in, level 3 out  pegs in, level 3 out     EDC tennis ball   5xs/each RB          1x  1x  1x     RFB P/S Green 20x Green 20x             Green 15x   RFB wrist fl/e Green 20x Green 20x           Green 15x   Pinch Ring  G/G 2x G/G 2x  G/G 2x, Tripod and palmar  R/G tripod G/B Tripod G/B tripod    R/G 2x    R/G x2   Isometric C             RTB x10          Thumb flexion  R 3x10  R 3x15  G3 x10  Blue 3x10  Blue 3x10         R 3x10   Gross Grasp  Green 3x10,  Blue x15  Green 3x10, Blue 2x15 Blue 3x15  Black 3x10  Black 3x10  Black 3x10       Green 3x10   FPLcizer 30x 45x 50x 50x 65x        EDC Jar 1x, Yellow 15x Jar 1x Yellow 15x Jarv1x   Lifting   15# 3x10 15# 3x10 15# 2x10, 10# overhead 10x 15# 2x10, 10# over head 2x10             Modalities  7/1 7/3 7/8 7/11 7/15 7/18  6/17  6/20  6/24  6/27    5' 15' 15' 15' 15' 15'  5'  5' 15'      Paraffin                    10                                        Assessment: See RE    Plan: Continue per plan of care

## 2019-07-19 ENCOUNTER — OFFICE VISIT (OUTPATIENT)
Dept: OBGYN CLINIC | Facility: HOSPITAL | Age: 69
End: 2019-07-19
Payer: COMMERCIAL

## 2019-07-19 VITALS
HEART RATE: 56 BPM | BODY MASS INDEX: 39.2 KG/M2 | SYSTOLIC BLOOD PRESSURE: 139 MMHG | DIASTOLIC BLOOD PRESSURE: 74 MMHG | HEIGHT: 62 IN | WEIGHT: 213 LBS

## 2019-07-19 DIAGNOSIS — M18.12 ARTHRITIS OF CARPOMETACARPAL (CMC) JOINT OF LEFT THUMB: Primary | ICD-10-CM

## 2019-07-19 DIAGNOSIS — G56.02 CARPAL TUNNEL SYNDROME ON LEFT: ICD-10-CM

## 2019-07-19 PROCEDURE — 99213 OFFICE O/P EST LOW 20 MIN: CPT | Performed by: ORTHOPAEDIC SURGERY

## 2019-07-19 NOTE — PROGRESS NOTES
ASSESSMENT/PLAN:    Assessment:   Left ECTR and Weilby 3/26/19    Plan:   The patient was instructed to continue wearing her thumb mp blocking splint or transition to a hand based thumb spica splint vs silver 8  There is a surgical option of a MP jt fusion however the patient would not like to proceed with this option at this time  The patient would like to continue to use her mp blocking splint at this time  She will remain out of work for the next 5 weeks  Follow Up:  6  week(s)    To Do Next Visit:       _____________________________________________________  CHIEF COMPLAINT:  Chief Complaint   Patient presents with    Left Thumb - Follow-up         SUBJECTIVE:  Liana Bush is a 81 y o  female who presents for follow up regarding Left ECTR and [place]il[place] 3/26/19  Since last visit, Liana Bush has tried therapy with relief  Today there is hyperextension to the left thumb MP jt when going to  and item such as a coffee cup  Patient was given a splint by OT to wear to prevent this  Patient states that her numbness and tingling has completely resolved at this time     Radiation: None  Associated symptoms: No Complaints    PAST MEDICAL HISTORY:  Past Medical History:   Diagnosis Date    Cancer ([address] )     Disease of thyroid gland     GERD (gastroesophageal reflux disease)     PONV (postoperative nausea and vomiting)        PAST SURGICAL HISTORY:  Past Surgical History:   Procedure Laterality Date    CAST APPLICATION Left 4/18/3949    Procedure: APPLICATION THUMB SPICA SPLINT;  Surgeon: Lara Velasquez MD;  Location: [facility];  Service: Orthopedics    CHOLECYSTECTOMY      COLONOSCOPY      GALLBLADDER SURGERY      GASTRIC RESTRICTION SURGERY      HERNIA REPAIR      HYSTERECTOMY      SC REPAIR INTERCARP/CARP-METACARP JT Left 3/26/2019    Procedure: ARTHROPLASTY Tee Overall;  Surgeon: Lara Velasquez MD;  [facility];  Service: Orthopedics    SC WRIST Tatyana Shaver LIG Left 3/26/2019    Procedure: RELEASE CARPAL TUNNEL ENDOSCOPIC;  Surgeon: Lara Velasquez MD;  Location: [facility];  Service: Orthopedics    TUMOR REMOVAL      arm    WRIST TENDON TRANSFER Left 3/26/2019    Procedure: TRANSFER TENDON HAND/WRIST FCR FOREARM TO THUMB;  Surgeon: Lara Velasquez MD;  [facility];  Service: Orthopedics       FAMILY HISTORY:  Family History   Problem Relation Age of Onset    Heart attack Mother     Asthma Mother     Heart attack Father        SOCIAL HISTORY:  Social History     Tobacco Use    Smoking status: Former Smoker    Smokeless tobacco: Never Used   Substance Use Topics    Alcohol use: Yes     Frequency: Monthly or less    Drug use: Not on file       MEDICATIONS:    Current Outpatient Medications:     allopurinol (ZYLOPRIM) 100 mg tablet, Take 100 mg by mouth 2 (two) times a day, Disp: , Rfl: 3    buPROPion (WELLBUTRIN SR) 150 mg 12 hr tablet, TAKE 1 TABLET BY MOUTH IN THE MORNING FOR 4 DAYS, THEN INCREASE TO 1 TABLET TWICE DAILY, Disp: , Rfl: 3    Calcium Carbonate-Vitamin D 600-400 MG-UNIT per chew tablet, Chew, Disp: , Rfl:     cyanocobalamin (VITAMIN B-12) 100 mcg tablet, Take by mouth, Disp: , Rfl:     escitalopram (LEXAPRO) 10 mg tablet, Take 10 mg by mouth daily, Disp: , Rfl: 3    HYDROcodone-acetaminophen (NORCO) 5-325 mg per tablet, Take 1 tablet by mouth every 6 (six) hours as needed for pain for up to 20 dosesMax Daily Amount: 4 tablets, Disp: 20 tablet, Rfl: 0    levothyroxine 75 mcg tablet, Take 75 mcg by mouth daily, Disp: , Rfl: 3    omeprazole (PriLOSEC) 40 MG capsule, Take 40 mg by mouth daily, Disp: , Rfl: 3    torsemide (DEMADEX) 20 mg tablet, Take 20 mg by mouth 2 (two) times a day, Disp: , Rfl: 3    naproxen sodium (ALEVE) 220 MG tablet, Take 1 tablet (220 mg total) by mouth 2 (two) times a day with meals for 5 days, Disp: 10 tablet, Rfl: 0    ALLERGIES:  Allergies   Allergen Reactions    Morphine Hcl  [Morphine] Itching REVIEW OF SYSTEMS:  Pertinent items are noted in HPI      LABS:  HgA1c: No results found for: HGBA1C  BMP:   Lab Results   Component Value Date    GLUCOSE 86 10/07/2015    CALCIUM 9 2 02/27/2019     10/07/2015    K 4 1 02/27/2019    CO2 32 02/27/2019     02/27/2019    BUN 19 02/27/2019    CREATININE 0 83 02/27/2019           _____________________________________________________  PHYSICAL EXAMINATION:  Vital signs: /74   Pulse 56   Ht 5' 1 5" (1 562 m)   Wt 96 6 kg (213 lb)   BMI 39 59 kg/m²   General: well developed and well nourished, alert, oriented times 3 and appears comfortable  Psychiatric: Normal  HEENT: Trachea Midline, No torticollis  Cardiovascular: No discernable arrhythmia  Pulmonary: No wheezing or stridor  Skin: No masses, erthema, lacerations, fluctation, ulcerations  Neurovascular: Sensation Intact to the Median, Ulnar, Radial Nerve, Motor Intact to the Median, Ulnar, Radial Nerve and Pulses Intact    MUSCULOSKELETAL EXAMINATION:  LEFT SIDE:  CMC: No tenderness to CMC and minimal hyperextension MP jt, NVI, negative tinels at the wrist     _____________________________________________________  STUDIES REVIEWED:  No Studies to review      PROCEDURES PERFORMED:  Procedures  No Procedures performed today   Scribe Attestation    I,:   Adeola Delaney am acting as a scribe while in the presence of the attending physician :        I,:   Talha Poole MD personally performed the services described in this documentation    as scribed in my presence :

## 2019-07-19 NOTE — LETTER
July 19, 2019     Patient: Dolores Coe   YOB: 1950   Date of Visit: 7/19/2019       To Whom it May Concern:    Wendy Dearoseanna is under my professional care  She was seen in my office on 7/19/2019  She should remain out of work at this time  She may return to work full duty on 9/3/19  If you have any questions or concerns, please don't hesitate to call           Sincerely,          Lorie Little MD        CC: No Recipients

## 2019-07-22 ENCOUNTER — OFFICE VISIT (OUTPATIENT)
Dept: OCCUPATIONAL THERAPY | Facility: MEDICAL CENTER | Age: 69
End: 2019-07-22
Payer: COMMERCIAL

## 2019-07-22 DIAGNOSIS — M25.542 PAIN IN THUMB JOINT WITH MOVEMENT OF LEFT HAND: Primary | ICD-10-CM

## 2019-07-22 PROCEDURE — 97530 THERAPEUTIC ACTIVITIES: CPT

## 2019-07-22 PROCEDURE — 97110 THERAPEUTIC EXERCISES: CPT

## 2019-07-22 NOTE — PROGRESS NOTES
Daily Note     Today's date: 2019  Patient name: Cheryle Sheikh  : 1950  MRN: [id number]  Referring provider: Emanuel Garza PA-C  Dx:   Encounter Diagnosis     ICD-10-CM    1  Pain in thumb joint with movement of left hand M25 542                   Subjective: "The MD said I go back to work 9/3  But I burned my fingers so I would rather not lift today      Objective: See treatment diary below    Manual  7/8 7/11 5/29 6/10 6/13  6/17  6/20  6/27  7/1  7/3   stm 8'  5' 5' 5'  5' 10'  10'  5'  8'   Edema mgt          5'          PROM         3'             IASTM                5'  5'     Scar tissue mgt     3' 3'               splint   Dorsal MP blocking 10'                        Exercise Diary  7/1 7/3 7/8 7/11 7/15 7/18 7/22  6/20  6/24  6/27   AROM wrist                      Finger to thumb opposition                     TGE's                     [place] opposition/transposition                       Grooved peg board                       RFB static                       [place] shooting                       FPL cizer                      velcro pull (guillen pinch                       tp salmon: FD & pinches                       velcro blocks guillen pinch   1 set  1 set         1 set  1 set  1 set   P/s mid weight                    HG level 2         Pegs green in level 3 out    Pegs, red in, level 3 out  pegs in, level 3 out     EDC tennis ball   5xs/each RB           1x  1x     RFB P/S Green 20x Green 20x             Green 15x   RFB wrist fl/e Green 20x Green 20x           Green 15x   Pinch Ring  G/G 2x G/G 2x  G/G 2x, Tripod and palmar  R/G tripod G/B Tripod G/B tripod  G/B LF&RF and key  R/G 2x    R/G x2   Isometric C                       Thumb flexion  R 3x10  R 3x15  G3 x10  Blue 3x10  Blue 3x10    Blue 3x10     R 3x10   Gross Grasp  Green 3x10,  Blue x15  Green 3x10, Blue 2x15 Blue 3x15  Black 3x10  Black 3x10  Black 3x10       Green 3x10   FPLcizer 30x 45x 50x 50x 65x  2x50      EDC Jar 1x, Yellow 15x Jar 1x Yellow 15x        Jarv1x   Lifting   15# 3x10 15# 3x10 15# 2x10, 10# overhead 10x 15# 2x10, 10# over head 2x10             Modalities  7/1 7/3 7/8 7/11 7/15 7/18 7/22  6/20  6/24  6/27    5' 15' 15' 15' 15' 15'  5'  5' 15'      Paraffin                    10                                        Assessment: Doing well, beginning to focus on work hardening  Modified PREs today due to blister on IF from a burn she sustained over the weekend  Plan: Continue per plan of care

## 2019-08-01 ENCOUNTER — OFFICE VISIT (OUTPATIENT)
Dept: OCCUPATIONAL THERAPY | Facility: MEDICAL CENTER | Age: 69
End: 2019-08-01
Payer: COMMERCIAL

## 2019-08-01 DIAGNOSIS — Z47.89 AFTERCARE FOLLOWING SURGERY OF THE MUSCULOSKELETAL SYSTEM: ICD-10-CM

## 2019-08-01 DIAGNOSIS — M25.542 PAIN IN THUMB JOINT WITH MOVEMENT OF LEFT HAND: Primary | ICD-10-CM

## 2019-08-01 PROCEDURE — 97530 THERAPEUTIC ACTIVITIES: CPT

## 2019-08-01 PROCEDURE — 97110 THERAPEUTIC EXERCISES: CPT

## 2019-08-05 ENCOUNTER — OFFICE VISIT (OUTPATIENT)
Dept: OCCUPATIONAL THERAPY | Facility: MEDICAL CENTER | Age: 69
End: 2019-08-05
Payer: COMMERCIAL

## 2019-08-05 DIAGNOSIS — M18.12 ARTHRITIS OF CARPOMETACARPAL (CMC) JOINT OF LEFT THUMB: ICD-10-CM

## 2019-08-05 DIAGNOSIS — Z47.89 AFTERCARE FOLLOWING SURGERY OF THE MUSCULOSKELETAL SYSTEM: ICD-10-CM

## 2019-08-05 DIAGNOSIS — M25.542 PAIN IN THUMB JOINT WITH MOVEMENT OF LEFT HAND: Primary | ICD-10-CM

## 2019-08-05 PROCEDURE — 97110 THERAPEUTIC EXERCISES: CPT

## 2019-08-05 NOTE — PROGRESS NOTES
Daily Note     Today's date: 2019  Patient name: Tressa Moeller  : 1950  MRN: [id number]  Referring provider: Flaco Lopez PA-C  Dx:   Encounter Diagnosis     ICD-10-CM    1  Pain in thumb joint with movement of left hand M25 542    2  Aftercare following surgery of the musculoskeletal system Z47 89    3  Arthritis of carpometacarpal [place]) joint of left thumb M18 12                   Subjective: I'm building muscle!     Objective: See treatment diary below    Manual  7/8 7/11 5/29 6/10 6/13  6/17  6/20  6/27  7/1  7/3   stm 8'  5' 5' 5'  5' 10'  10'  5'  8'   Edema mgt          5'          PROM         3'             IASTM                5'  5'     Scar tissue mgt     3' 3'               splint   Dorsal MP blocking 10'                        Exercise Diary  7/1 7/3 7/8 7/11 7/15 7/18 7/22 8/1  8/5  6/27   AROM wrist                      Finger to thumb opposition                     TGE's                     Marbles opposition/transposition                       Grooved peg board                       RFB static                       [place] shooting                       FPL cizer                      velcro pull (guillen pinch                       tp salmon: FD & pinches                       velcro blocks guillen pinch   1 set  1 set           1 set   P/s mid weight                    HG level 2         Pegs green in level 3 out    Pegs, red in, level 3 out  pegs in red,  level 3 out     EDC tennis ball   5xs/each RB           1x      RFB P/S Green 20x Green 20x             Green 15x   RFB wrist fl/e Green 20x Green 20x           Green 15x   Pinch Ring  G/G 2x G/G 2x  G/G 2x, Tripod and palmar  R/G tripod G/B Tripod G/B tripod  G/B LF&RF and key  G/B x2  G/B x2  R/G x2   Isometric C                       Thumb flexion  R 3x10  R 3x15  G3 x10  Blue 3x10  Blue 3x10    Blue 3x10  Blue 3x10  Blue 3x10 R 3x10   Gross Grasp  Green 3x10,  Blue x15  Green 3x10, Blue 2x15 Blue 3x15  Black 3x10  Black 3x10  Black 3x10       Green 3x10   FPLcizer 30x 45x 50x 50x 65x  2x50 2x50 2x50    EDC Jar 1x, Yellow 15x Jar 1x Yellow 15x        Jarv1x   Lifting   15# 3x10 15# 3x10 15# 2x10, 10# overhead 10x 15# 2x10, 10# over head 2x10  15# 2x10, 10# overhead 15# 2x10, 10# overhead          Modalities  7/1 7/3 7/8 7/11 7/15 7/18 7/22  8/1  8/5  6/27    5' 15' 15' 15' 15' 15'  5'  5' 10'      Paraffin                    10                                        Assessment: Doing well, more fatigue today, but she notes that caring for her  is getting harder as he is having more falls  Plan: Continue per plan of care

## 2019-08-08 ENCOUNTER — APPOINTMENT (OUTPATIENT)
Dept: OCCUPATIONAL THERAPY | Facility: MEDICAL CENTER | Age: 69
End: 2019-08-08
Payer: COMMERCIAL

## 2019-08-08 ENCOUNTER — OFFICE VISIT (OUTPATIENT)
Dept: URGENT CARE | Facility: MEDICAL CENTER | Age: 69
End: 2019-08-08
Payer: COMMERCIAL

## 2019-08-08 VITALS
HEART RATE: 58 BPM | SYSTOLIC BLOOD PRESSURE: 130 MMHG | DIASTOLIC BLOOD PRESSURE: 72 MMHG | BODY MASS INDEX: 35.62 KG/M2 | HEIGHT: 65 IN | OXYGEN SATURATION: 98 % | RESPIRATION RATE: 18 BRPM | TEMPERATURE: 99 F | WEIGHT: 213.8 LBS

## 2019-08-08 DIAGNOSIS — L03.115 CELLULITIS OF RIGHT LOWER EXTREMITY: Primary | ICD-10-CM

## 2019-08-08 PROCEDURE — S9083 URGENT CARE CENTER GLOBAL: HCPCS | Performed by: PHYSICIAN ASSISTANT

## 2019-08-08 PROCEDURE — G0381 LEV 2 HOSP TYPE B ED VISIT: HCPCS | Performed by: PHYSICIAN ASSISTANT

## 2019-08-08 PROCEDURE — 90715 TDAP VACCINE 7 YRS/> IM: CPT

## 2019-08-08 PROCEDURE — 90471 IMMUNIZATION ADMIN: CPT | Performed by: PHYSICIAN ASSISTANT

## 2019-08-08 RX ORDER — CEPHALEXIN 500 MG/1
500 CAPSULE ORAL EVERY 6 HOURS SCHEDULED
Qty: 28 CAPSULE | Refills: 0 | Status: SHIPPED | OUTPATIENT
Start: 2019-08-08 | End: 2019-08-15

## 2019-08-08 NOTE — PATIENT INSTRUCTIONS
1  Take Keflex 500mg  4x daily x 7 days  2  Tylenol as needed for pain  3  Follow up with PCP in 3-5 days for re-evaluation  4  Proceed to  ER if symptoms worsen

## 2019-08-08 NOTE — PROGRESS NOTES
[facility]        NAME: Mary Anne Dixon is a 81 y o  female  : 1950    MRN: [id number]  DATE: 2019  TIME: 6:48 PM    Assessment and Plan   Cellulitis of right lower extremity [L03 115]  1  Cellulitis of right lower extremity  TDAP Vaccine greater than or equal to 6yo    cephalexin (KEFLEX) 500 mg capsule         Patient Instructions     1  Take Keflex 500mg  4x daily x 7 days  2  Tylenol as needed for pain  3  Follow up with PCP in 3-5 days for re-evaluation  4  Proceed to  ER if symptoms worsen  Chief Complaint     Chief Complaint   Patient presents with    Leg Injury     Pt hit lower right leg on the metal corner of her bed approximately 1 week ago  Leg red and swollen  Pt's last Tdap over 5 years ago  History of Present Illness       Olayinka Leung is a 73-year-old female presents with pain and swelling of her right lower leg has been increasing over the past 7 days  Patient struck her lower leg off the corner of the bed frame approximately 1 week prior  Patient reports areas now become red, swollen and began draining over the past 3 days  She denies any fever, chills or body aches since the onset of symptoms  Patient is unaware of her last tetanus vaccine  Review of Systems   Review of Systems   Constitutional: Negative  Musculoskeletal: Negative for gait problem and myalgias  Skin: Positive for color change and wound           Current Medications       Current Outpatient Medications:     allopurinol (ZYLOPRIM) 100 mg tablet, Take 100 mg by mouth 2 (two) times a day, Disp: , Rfl: 3    buPROPion (WELLBUTRIN SR) 150 mg 12 hr tablet, TAKE 1 TABLET BY MOUTH IN THE MORNING FOR 4 DAYS, THEN INCREASE TO 1 TABLET TWICE DAILY, Disp: , Rfl: 3    Calcium Carbonate-Vitamin D 600-400 MG-UNIT per chew tablet, Chew, Disp: , Rfl:     cyanocobalamin (VITAMIN B-12) 100 mcg tablet, Take by mouth, Disp: , Rfl:     escitalopram (LEXAPRO) 10 mg tablet, Take 10 mg by mouth daily, Disp: , Rfl: 3    levothyroxine 75 mcg tablet, Take 75 mcg by mouth daily, Disp: , Rfl: 3    naproxen sodium (ALEVE) 220 MG tablet, Take 1 tablet (220 mg total) by mouth 2 (two) times a day with meals for 5 days, Disp: 10 tablet, Rfl: 0    omeprazole (PriLOSEC) 40 MG capsule, Take 40 mg by mouth daily, Disp: , Rfl: 3    torsemide (DEMADEX) 20 mg tablet, Take 20 mg by mouth 2 (two) times a day, Disp: , Rfl: 3    cephalexin (KEFLEX) 500 mg capsule, Take 1 capsule (500 mg total) by mouth every 6 (six) hours for 7 days, Disp: 28 capsule, Rfl: 0    HYDROcodone-acetaminophen (NORCO) 5-325 mg per tablet, Take 1 tablet by mouth every 6 (six) hours as needed for pain for up to 20 dosesMax Daily Amount: 4 tablets (Patient not taking: Reported on 8/8/2019), Disp: 20 tablet, Rfl: 0    Current Allergies     Allergies as of 08/08/2019 - Reviewed 08/08/2019   Allergen Reaction Noted    Morphine hcl  [morphine] Itching 07/05/2012            The following portions of the patient's history were reviewed and updated as appropriate: allergies, current medications, past family history, past medical history, past social history, past surgical history and problem list      Past Medical History:   Diagnosis Date    Cancer ([address] )     Disease of thyroid gland     GERD (gastroesophageal reflux disease)     PONV (postoperative nausea and vomiting)        Past Surgical History:   Procedure Laterality Date    CAST APPLICATION Left 9/44/6914    Procedure: Anne Limb;  Surgeon: Ayo Lyman MD;  [facility];  Service: Orthopedics    CHOLECYSTECTOMY      COLONOSCOPY      GALLBLADDER SURGERY      GASTRIC RESTRICTION SURGERY      HERNIA REPAIR      HYSTERECTOMY      ME REPAIR INTERCARP/CARP-METACARP JT Left 3/26/2019    Procedure: ARTHROPLASTY Satinder Liseth;  Surgeon: Ayo Lyman MD;  [facility];  Service: Orthopedics    ME WRIST Veneda Hacking LIG Left 3/26/2019 Procedure: RELEASE CARPAL TUNNEL ENDOSCOPIC;  Surgeon: Patti Muhammad MD;  [facility];  Service: Orthopedics    TUMOR REMOVAL      arm    WRIST TENDON TRANSFER Left 3/26/2019    Procedure: TRANSFER TENDON HAND/WRIST FCR FOREARM TO THUMB;  Surgeon: Patti Muhammad MD;  [facility];  Service: Orthopedics       Family History   Problem Relation Age of Onset    Heart attack Mother     Asthma Mother     Heart attack Father          Medications have been verified  Objective   /72   Pulse 58   Temp 99 °F (37 2 °C) (Temporal)   Resp 18   Ht 5' 5" (1 651 m)   Wt 97 kg (213 lb 12 8 oz)   SpO2 98%   BMI 35 58 kg/m²        Physical Exam     Physical Exam   Constitutional: She appears well-developed and well-nourished  No distress  Cardiovascular: Normal rate, regular rhythm and normal heart sounds  No murmur heard  Pulmonary/Chest: Effort normal and breath sounds normal    Musculoskeletal:        Right ankle: Normal  She exhibits no swelling and no ecchymosis     Skin:

## 2019-08-12 ENCOUNTER — APPOINTMENT (OUTPATIENT)
Dept: OCCUPATIONAL THERAPY | Facility: MEDICAL CENTER | Age: 69
End: 2019-08-12
Payer: COMMERCIAL

## 2019-08-13 ENCOUNTER — OFFICE VISIT (OUTPATIENT)
Dept: OCCUPATIONAL THERAPY | Facility: MEDICAL CENTER | Age: 69
End: 2019-08-13
Payer: COMMERCIAL

## 2019-08-13 DIAGNOSIS — M18.12 ARTHRITIS OF CARPOMETACARPAL (CMC) JOINT OF LEFT THUMB: ICD-10-CM

## 2019-08-13 DIAGNOSIS — M25.542 PAIN IN THUMB JOINT WITH MOVEMENT OF LEFT HAND: Primary | ICD-10-CM

## 2019-08-13 DIAGNOSIS — Z47.89 AFTERCARE FOLLOWING SURGERY OF THE MUSCULOSKELETAL SYSTEM: ICD-10-CM

## 2019-08-13 PROCEDURE — 97110 THERAPEUTIC EXERCISES: CPT

## 2019-08-13 PROCEDURE — 97530 THERAPEUTIC ACTIVITIES: CPT

## 2019-08-13 NOTE — PROGRESS NOTES
Daily Note     Today's date: 2019  Patient name: Rosaura Parker  : 1950  MRN: [id number]  Referring provider: Sukhi Mckeon PA-C  Dx:   Encounter Diagnosis     ICD-10-CM    1  Pain in thumb joint with movement of left hand M25 542    2  Aftercare following surgery of the musculoskeletal system Z47 89    3  Arthritis of carpometacarpal [place]) joint of left thumb M18 12                   Subjective: It's sore today  My  had a seizure and I had to lift him from the floor to the bed      Objective: See treatment diary below    Manual  7/8 7/11 5/29 6/10 6/13  6/17  6/20  6/27  7/1  8/13   stm 8'  5' 5' 5'  5' 10'  10'  5'  5'   Edema mgt          5'          PROM         3'             IASTM                5'  5'     Scar tissue mgt     3' 3'               splint   Dorsal MP blocking 10'                        Exercise Diary  7/1 7/3 7/8 7/11 7/15 7/18 7/22 8/1  8/5  8/13   AROM wrist                      Finger to thumb opposition                     TGE's                     Marbles opposition/transposition                       Grooved peg board                       RFB static                       [place] shooting                       FPL cizer                      velcro pull (guillen pinch                       tp salmon: FD & pinches                       velcro blocks guillen pinch   1 set  1 set             P/s mid weight                    HG level 2         Pegs green in level 3 out    Pegs, red in, level 3 out  pegs in red,  level 3 out  pegs in red, level 3 out   EDC tennis ball   5xs/each RB           1x  1x   RFB P/S Green 20x Green 20x               RFB wrist fl/e Green 20x Green 20x              Pinch Ring  G/G 2x G/G 2x  G/G 2x, Tripod and palmar  R/G tripod G/B Tripod G/B tripod  G/B LF&RF and key  G/B x2  G/B x2  G/B x2   Isometric C                       Thumb flexion  R 3x10  R 3x15  G3 x10  Blue 3x10  Blue 3x10    Blue 3x10  Blue 3x10  Blue 3x10 Blue 3x10   Gross Grasp  Green 3x10,  Blue x15  Green 3x10, Blue 2x15 Blue 3x15  Black 3x10  Black 3x10  Black 3x10       Black 3x10   FPLcizer 30x 45x 50x 50x 65x  2x50 2x50 2x50 2x50   EDC Jar 1x, Yellow 15x Jar 1x Yellow 15x           Lifting   15# 3x10 15# 3x10 15# 2x10, 10# overhead 10x 15# 2x10, 10# over head 2x10  15# 2x10, 10# overhead 15# 2x10, 10# overhead Deferred          Modalities  7/1 7/3 7/8 7/11 7/15 7/18 7/22  8/1  8/5  8/13   MH 5' 15' 15' 15' 15' 15'  5'  5' 10'  10'    Paraffin                                                            Assessment: Increased soreness and swelling CMC joint  Pt deferred lifting today due to soreness  Less discomfort after STM to thumb  Plan: Continue per plan of care  F/U with Dr Cheryl Shaver Monday, 8/19/19  Plan is to RTW 9/3/19

## 2019-08-15 ENCOUNTER — OFFICE VISIT (OUTPATIENT)
Dept: OCCUPATIONAL THERAPY | Facility: MEDICAL CENTER | Age: 69
End: 2019-08-15
Payer: COMMERCIAL

## 2019-08-15 DIAGNOSIS — M25.542 PAIN IN THUMB JOINT WITH MOVEMENT OF LEFT HAND: Primary | ICD-10-CM

## 2019-08-15 PROCEDURE — 97140 MANUAL THERAPY 1/> REGIONS: CPT

## 2019-08-15 PROCEDURE — 97110 THERAPEUTIC EXERCISES: CPT

## 2019-08-15 PROCEDURE — 97530 THERAPEUTIC ACTIVITIES: CPT

## 2019-08-15 NOTE — PROGRESS NOTES
Daily Note     Today's date: 8/15/2019  Patient name: Robyn Bermeo  : 1950  MRN: [id number]  Referring provider: Josie Guajardo PA-C  Dx:   Encounter Diagnosis     ICD-10-CM    1  Pain in thumb joint with movement of left hand M25 542                   Subjective: It's still sore today      Objective: See treatment diary below    Manual  8/15 7/11 5/29 6/10 6/13  6/17  6/20  6/27  7/1  8/13   stm 8'  5' 5' 5'  5' 10'  10'  5'  5'   Edema mgt          5'          PROM         3'             IASTM                5'  5'     Scar tissue mgt     3' 3'               splint   Dorsal MP blocking 10'                        Exercise Diary  8/15 7/3 7/8 7/11 7/15 7/18 7/22 8/1  8/5  8/13   AROM wrist                      Finger to thumb opposition                     TGE's                     Marbles opposition/transposition                       Grooved peg board                       RFB static                       [place] shooting                       FPL cizer                      velcro pull (guillen pinch                       tp salmon: FD & pinches                       velcro blocks guillen pinch    1 set             P/s mid weight                    HG level 2 Red in, level 3 out        Pegs green in level 3 out    Pegs, red in, level 3 out  pegs in red,  level 3 out  pegs in red, level 3 out   EDC tennis ball   5xs/each RB           1x  1x   RFB P/S  Green 20x               RFB wrist fl/e  Green 20x              Pinch Ring  G/B 2x G/G 2x  G/G 2x, Tripod and palmar  R/G tripod G/B Tripod G/B tripod  G/B LF&RF and key  G/B x2  G/B x2  G/B x2   Isometric C                       Thumb flexion  blue 3x10  R 3x15  G3 x10  Blue 3x10  Blue 3x10    Blue 3x10  Blue 3x10  Blue 3x10 Blue 3x10   Gross Grasp Black 3x10  Green 3x10, Blue 2x15 Blue 3x15  Black 3x10  Black 3x10  Black 3x10       Black 3x10   FPLcizer 2x50 45x 50x 50x 65x  2x50 2x50 2x50 2x50   EDC  Jar 1x Yellow 15x           Lifting Deferred  15# 3x10 15# 3x10 15# 2x10, 10# overhead 10x 15# 2x10, 10# over head 2x10  15# 2x10, 10# overhead 15# 2x10, 10# overhead Deferred          Modalities  8/15 7/3 7/8 7/11 7/15 7/18 7/22  8/1  8/5  8/13    5' 15' 15' 15' 15' 15'  5'  5' 10'  10'    Paraffin                                                            Assessment: Doing better, continued to defer lifting secondary to increased pain and soreness from situations at home  Will begin again next session  Plan: Continue per plan of care  F/U with Dr Lv Condon 8/30/19  Plan is to RTW 9/3/19

## 2019-08-19 ENCOUNTER — APPOINTMENT (OUTPATIENT)
Dept: OCCUPATIONAL THERAPY | Facility: MEDICAL CENTER | Age: 69
End: 2019-08-19
Payer: COMMERCIAL

## 2019-08-20 ENCOUNTER — OFFICE VISIT (OUTPATIENT)
Dept: OCCUPATIONAL THERAPY | Facility: MEDICAL CENTER | Age: 69
End: 2019-08-20
Payer: COMMERCIAL

## 2019-08-20 DIAGNOSIS — M18.12 ARTHRITIS OF CARPOMETACARPAL (CMC) JOINT OF LEFT THUMB: ICD-10-CM

## 2019-08-20 DIAGNOSIS — Z47.89 AFTERCARE FOLLOWING SURGERY OF THE MUSCULOSKELETAL SYSTEM: ICD-10-CM

## 2019-08-20 DIAGNOSIS — M25.542 PAIN IN THUMB JOINT WITH MOVEMENT OF LEFT HAND: Primary | ICD-10-CM

## 2019-08-20 PROCEDURE — 97140 MANUAL THERAPY 1/> REGIONS: CPT

## 2019-08-20 PROCEDURE — 97110 THERAPEUTIC EXERCISES: CPT

## 2019-08-20 PROCEDURE — 97530 THERAPEUTIC ACTIVITIES: CPT

## 2019-08-20 NOTE — PROGRESS NOTES
Daily Note     Today's date: 2019  Patient name: Robyn Bermeo  : 1950  MRN: [id number]  Referring provider: Josie Guajardo PA-C  Dx:   Encounter Diagnosis     ICD-10-CM    1  Pain in thumb joint with movement of left hand M25 542    2  Aftercare following surgery of the musculoskeletal system Z47 89    3  Arthritis of carpometacarpal [place]) joint of left thumb M18 12                   Subjective: My  almost fell and I had to catch him, so it's sore  Objective: See treatment diary below    Manual  8/15 8/20 5/29 6/10 6/13  6/17  6/20  6/27  7/1  8/13   stm 8' 8' 5' 5' 5'  5' 10'  10'  5'  5'   Edema mgt          5'          PROM         3'             IASTM                5'  5'     Scar tissue mgt     3' 3'               splint                           Exercise Diary  8/15 8/20 7/8 7/11 7/15 7/18 7/22 8/1  8/5  8/13   HG level 2 Red in, level 3 out Red in,level 3 out      Pegs green in level 3 out    Pegs, red in, level 3 out  pegs in red,  level 3 out  pegs in red, level 3 out   [facility] tennis ball   5xs/each RB           1x  1x   Pinch Ring  G/B 2x G/B 2x  G/G 2x, Tripod and palmar  R/G tripod G/B Tripod G/B tripod  G/B LF&RF and key  G/B x2  G/B x2  G/B x2    Thumb flexion  blue 3x10  Blue 3x10  G3 x10  Blue 3x10  Blue 3x10    Blue 3x10  Blue 3x10  Blue 3x10 Blue 3x10   Gross Grasp Black 3x10  Black 3x10 Blue 3x15  Black 3x10  Black 3x10  Black 3x10       Black 3x10   FPLcizer 2x50 2x50 50x 50x 65x  2x50 2x50 2x50 2x50   EDC             Lifting Deferred  15# 3x10 15# 3x10 15# 2x10, 10# overhead 10x 15# 2x10, 10# over head 2x10  15# 2x10, 10# overhead 15# 2x10, 10# overhead Deferred          Modalities  8/15 8/20 7/8 7/11 7/15 7/18 7/22  8/1  8/5  8/13    5' 5' 15' 15' 15' 15'  5'  5' 10'  10'    Paraffin                                                            Assessment: Doing better, continued to defer lifting secondary to increased pain and soreness from situations at home        Plan: Continue per plan of care  F/U with Dr Devra Cowden 8/30/19  Plan is to RTW 9/3/19

## 2019-08-22 ENCOUNTER — OFFICE VISIT (OUTPATIENT)
Dept: OCCUPATIONAL THERAPY | Facility: MEDICAL CENTER | Age: 69
End: 2019-08-22
Payer: COMMERCIAL

## 2019-08-22 DIAGNOSIS — M25.542 PAIN IN THUMB JOINT WITH MOVEMENT OF LEFT HAND: Primary | ICD-10-CM

## 2019-08-22 DIAGNOSIS — Z47.89 AFTERCARE FOLLOWING SURGERY OF THE MUSCULOSKELETAL SYSTEM: ICD-10-CM

## 2019-08-22 PROCEDURE — 97140 MANUAL THERAPY 1/> REGIONS: CPT

## 2019-08-22 NOTE — PROGRESS NOTES
Daily Note     Today's date: 2019  Patient name: Aguilar Hartman  : 1950  MRN: [id number]  Referring provider: Dannial Fleischer, PA-C  Dx:   Encounter Diagnosis     ICD-10-CM    1  Pain in thumb joint with movement of left hand M25 542    2  Aftercare following surgery of the musculoskeletal system Z47 89                   Subjective: I think I'm ready to go back to work    Objective: See treatment diary below  Shortened session due to another appt  Manual  8/15 8/20 5/29 6/10 6/13  6/17  6/20  6/27  7/1  8/13   stm 8' 8' 5' 5' 5'  5' 10'  10'  5'  5'   Edema mgt          5'          PROM         3'             IASTM                5'  5'     Scar tissue mgt     3' 3'               splint                           Exercise Diary  8/15 8/20 8/22 7/11 7/15 7/18 7/22 8/1  8/5  8/13   HG level 2 Red in, level 3 out Red in,level 3 out      Pegs green in level 3 out    Pegs, red in, level 3 out  pegs in red,  level 3 out  pegs in red, level 3 out   [facility] tennis ball   5xs/each RB           1x  1x   Pinch Ring  G/B 2x G/B 2x  y/y 2x, w/o splint  R/G tripod G/B Tripod G/B tripod  G/B LF&RF and key  G/B x2  G/B x2  G/B x2    Thumb flexion  blue 3x10  Blue 3x10  B 3x10  Blue 3x10  Blue 3x10    Blue 3x10  Blue 3x10  Blue 3x10 Blue 3x10   Gross Grasp Black 3x10  Black 3x10 Black 3x10  Black 3x10  Black 3x10  Black 3x10       Black 3x10   FPLcizer 2x50 2x50  50x 65x  2x50 2x50 2x50 2x50   EDC             Lifting Deferred  15# 3x10 15# 3x10 15# 2x10, 10# overhead 10x 15# 2x10, 10# over head 2x10  15# 2x10, 10# overhead 15# 2x10, 10# overhead Deferred          Modalities  8/15 8/20 8/22 7/11 7/15 7/18 7/22  8/1  8/5  8/13   MH 5' 5' 5' 15' 15' 15'  5'  5' 10'  10'    Paraffin                                                            Assessment: Doing better, continued to defer lifting secondary to increased pain and soreness from situations at home  RE next week before MD appt   Doing very well, began very light strengthening out of the splint  Plan: Continue per plan of care  F/U with Dr Hermelindo Barnes 8/30/19  Plan is to RTW 9/3/19

## 2019-08-26 ENCOUNTER — APPOINTMENT (OUTPATIENT)
Dept: OCCUPATIONAL THERAPY | Facility: MEDICAL CENTER | Age: 69
End: 2019-08-26
Payer: COMMERCIAL

## 2019-08-27 ENCOUNTER — APPOINTMENT (OUTPATIENT)
Dept: OCCUPATIONAL THERAPY | Facility: MEDICAL CENTER | Age: 69
End: 2019-08-27
Payer: COMMERCIAL

## 2019-08-29 ENCOUNTER — EVALUATION (OUTPATIENT)
Dept: OCCUPATIONAL THERAPY | Facility: MEDICAL CENTER | Age: 69
End: 2019-08-29
Payer: COMMERCIAL

## 2019-08-29 DIAGNOSIS — Z47.89 AFTERCARE FOLLOWING SURGERY OF THE MUSCULOSKELETAL SYSTEM: ICD-10-CM

## 2019-08-29 DIAGNOSIS — M25.542 PAIN IN THUMB JOINT WITH MOVEMENT OF LEFT HAND: Primary | ICD-10-CM

## 2019-08-29 DIAGNOSIS — M18.12 ARTHRITIS OF CARPOMETACARPAL (CMC) JOINT OF LEFT THUMB: ICD-10-CM

## 2019-08-29 PROCEDURE — 97140 MANUAL THERAPY 1/> REGIONS: CPT

## 2019-08-29 PROCEDURE — 97110 THERAPEUTIC EXERCISES: CPT

## 2019-08-29 NOTE — PROGRESS NOTES
OT Re-Evaluation     Today's date: 2019  Patient name: Kurt Meyer  : 1950  MRN: [id number]  Referring provider: Lilly Zurita PA-C  Dx:   Encounter Diagnosis     ICD-10-CM    1  Pain in thumb joint with movement of left hand M25 542    2  Aftercare following surgery of the musculoskeletal system Z47 89    3  Arthritis of carpometacarpal (CMC) joint of left thumb M18 12                   Assessment  Assessment details: 78 yo RHD female POD16  She presents with steristrips at base of [facility] also performed  Physically, she has impaired skin integrity, decreased ROM/strength and increased pain/edema  Functionally, she is not using her left hand  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, 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 and is currently not driving  Patient would benefit from skilled OT in order to resume pain free [place] in ADL's, IADL's and work tasks  19:  Patient has made gains in AROM, edema, skin integrity and functional status  She is not lifting weighted items  She is performing light activities  She is starting to wean from her splint  Patient would benefit from cont skilled OT in order to return to work full duty and care for her  who has Alzheirmer's Disease  : Shiela Melgar has had a slight decline in AROM, however her strength has greatly improved  She notes she does have pain lifting heavier items but overall she feels that lifting is getting easier  Her scar density is continuing to decrease and the proximal part of her scar is no longer adhered  She would benefit from cont  OT in order to continue to increase her strength and decrease her pain to allow her complete her ADL/IADLs without deficit and so she can RTW      : Linda's IP motion has greatly increased   She currently presents with symptoms in line with a volar plate avulsion and she has been placed in a dorsal blocking splint per MD to be worn at all times  Her pinch strength has increased, however her  strength slightly declined  She has begun PREs focused on the tasks at her job to prepare her to RTW  She would benefit from OT focused on work-hardening to allow to her continue to gain strength to allow her to RTW safely  8/29: Linda's ROM and strength is WFL  She has been compliant wearing her dorsal blocking MP splint which has decreased her pain, increased her function, and decreased her MP hypermobility  She would like to continue therapy 1x a week when she goes back to work in order to continue her work hardening program as it was put on hold due to problems at home  Recommend OT 1x per week for the next 4 weeks to help transition back to work  Impairments: abnormal or restricted ROM, impaired physical strength, lacks appropriate home exercise program and pain with function  Understanding of Dx/Px/POC: good   Prognosis: good    Goals  STG:  In 3 weeks  1  Decreased pain to 2-3/10 when performing ADL's  Met  2  Increased ROM 5-15 degrees in order to independently perform light weight IADL's met  3  Improve FOTO score 10%  met    LTG's 8 weeks  1    Patient will report minimal to no pain with all activities  2   Sufficient strength to perform ADL's and IADL's  New STG (6/24):  1  Increase  strength by 5 pounds in 4 weeks MET  2  Increase pinch strength by 2 pounds in 4 weeks  MET  3  Pain with lifting decreased to 1-2/10 in 4 weeks   MET    Plan  Patient would benefit from: OT eval, skilled occupational therapy and custom splinting  Planned modality interventions: TENS, thermotherapy: hydrocollator packs, thermotherapy: paraffin bath, ultrasound, fluidotherapy, electrical stimulation/Russian stimulation and cryotherapy  Planned therapy interventions: joint mobilization, manual therapy, patient education, orthotic fitting/training, neuromuscular re-education, coordination, home exercise program, graded exercise, graded activity, functional ROM exercises, therapeutic training, therapeutic exercise, therapeutic activities, stretching and strengthening  Frequency: 2x week  Duration in weeks: 8  Plan of Care beginning date: 2019  Plan of Care expiration date: 10/22/2019  Treatment plan discussed with: patient        Subjective Evaluation    History of Present Illness  Date of surgery: 3/26/2019  Mechanism of injury: surgery  Mechanism of injury: 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  Then my thumb started swelling and I got a really big lump and then I noticed my left thumb was longer then the right  She then went to doctor who recommended hand surgery and she was referred to hand surgeon Hand surgeon noted she needed surgery following review of x-ray and MRI  Recurrent probem    Quality of life: good    Pain  Current pain ratin  At best pain ratin  At worst pain ratin (With extension blocking splint on)  Relieving factors: medications, support and rest  Aggravating factors: lifting  Progression: improved    Social Support  Lives in: multiple-level home  Lives with: spouse    Employment status: not working  Hand dominance: right  Life stress: medium      Diagnostic Tests  X-ray: abnormal  MRI studies: abnormal  Treatments  Previous treatment: immobilization  Patient Goals  Patient goals for therapy: decreased edema, increased strength and decreased pain  Patient goal: to use my hand normally          Objective     Active Range of Motion     Left Wrist   Wrist flexion: 50 degrees   Wrist extension: 60 degrees     Left Thumb   Flexion     MP: 42 degrees    DIP: 80 degrees  Palmar Abduction     CMC: 62 degrees  Radial abduction    CMC: 65 degrees    Strength/Myotome Testing     Left Wrist/Hand      (2nd hand position)     Trial 1: 40    Thumb Strength  Key/Lateral Pinch     Trail 1: 14  Tip/Two-Point Pinch     Trial 1: 10  Palmar/Three-Point Pinch     Trial 1: 10    Right Wrist/Hand      (2nd hand position)     Trial 1: 55    Thumb Strength   Key/Lateral Pinch     Trial 1: 17  Tip/Two-Point Pinch     Trial 1: 11  Palmar/Three-Point Pinch     Trial 1: 12          Precautions:   Sailaja's procedure: No thumb ADD; universal precautions    Daily Treatment Diary

## 2019-08-29 NOTE — PROGRESS NOTES
Daily Note     Today's date: 2019  Patient name: Benny Coronel  : 1950  MRN: [id number]  Referring provider: Adrian Krueger PA-C  Dx:   Encounter Diagnosis     ICD-10-CM    1  Pain in thumb joint with movement of left hand M25 542    2  Aftercare following surgery of the musculoskeletal system Z47 89    3  Arthritis of carpometacarpal [place]) joint of left thumb M18 12                   Subjective: I want to continue to come to therapy even when i'm working    Objective: See treatment diary below  Shortened session due to another appt  Manual  8/15 8/20 8/29 6/10 6/13  6/17  6/20  6/27  7/1  8/13   stm 8' 8'  5' 5'  5' 10'  10'  5'  5'   Edema mgt          5'          PROM         3'             IASTM                5'  5'     Scar tissue mgt      3'               splint     10'                      Exercise Diary  8/15 8/20 8/22 7/11 7/15 7/18 7/22 8/1  8/5  8/13   HG level 2 Red in, level 3 out Red in,level 3 out      Pegs green in level 3 out    Pegs, red in, level 3 out  pegs in red,  level 3 out  pegs in red, level 3 out   EDC tennis ball   5xs/each RB           1x  1x   Pinch Ring  G/B 2x G/B 2x  y/y 2x, w/o splint  R/G tripod G/B Tripod G/B tripod  G/B LF&RF and key  G/B x2  G/B x2  G/B x2    Thumb flexion  blue 3x10  Blue 3x10  B 3x10  Blue 3x10  Blue 3x10    Blue 3x10  Blue 3x10  Blue 3x10 Blue 3x10   Gross Grasp Black 3x10  Black 3x10 Black 3x10  Black 3x10  Black 3x10  Black 3x10       Black 3x10   FPLcizer 2x50 2x50  50x 65x  2x50 2x50 2x50 2x50   EDC             Lifting Deferred  15# 3x10 15# 3x10 15# 2x10, 10# overhead 10x 15# 2x10, 10# over head 2x10  15# 2x10, 10# overhead 15# 2x10, 10# overhead Deferred          Modalities  8/15 8/20 8/22 8/29 7/15 7/18 7/22  8/1  8/5  8/13    5' 5' 5' 15' 15' 15'  5'  5' 10'  10'    Paraffin                                                            Assessment: See RE    Plan: Continue per plan of care  F/U with Dr Nargis Lnadrum 19    Plan is to RTW 9/3/19

## 2019-08-30 ENCOUNTER — OFFICE VISIT (OUTPATIENT)
Dept: OBGYN CLINIC | Facility: HOSPITAL | Age: 69
End: 2019-08-30
Payer: COMMERCIAL

## 2019-08-30 VITALS
HEIGHT: 65 IN | HEART RATE: 58 BPM | BODY MASS INDEX: 36.65 KG/M2 | SYSTOLIC BLOOD PRESSURE: 149 MMHG | DIASTOLIC BLOOD PRESSURE: 78 MMHG | WEIGHT: 220 LBS

## 2019-08-30 DIAGNOSIS — M18.12 ARTHRITIS OF CARPOMETACARPAL (CMC) JOINT OF LEFT THUMB: Primary | ICD-10-CM

## 2019-08-30 DIAGNOSIS — G56.02 CARPAL TUNNEL SYNDROME ON LEFT: ICD-10-CM

## 2019-08-30 PROCEDURE — 99213 OFFICE O/P EST LOW 20 MIN: CPT | Performed by: PHYSICIAN ASSISTANT

## 2019-08-30 NOTE — PROGRESS NOTES
ASSESSMENT/PLAN:    Assessment:   Left ECTR and Weilby 3/26/19  Slight MP joint hyperextension    Plan:   Continue therapy and dorsal blocking splint for now  I will discuss pt's case with Dr Liz Bernal upon his return to see about continuation of these and determine if/when follow up necessary        _____________________________________________________  CHIEF COMPLAINT:  No chief complaint on file  SUBJECTIVE:  Chris Hills is a 81 y o  female who presents for follow up Left ECTR and Weilby 3/26/19  She has also been treated for MP joint hyperextension with therapy and dorsal block splinting  States overall she has noticed less hyperextension when she goes to  items       PAST MEDICAL HISTORY:  Past Medical History:   Diagnosis Date    Cancer ([address] )     Disease of thyroid gland     GERD (gastroesophageal reflux disease)     PONV (postoperative nausea and vomiting)        PAST SURGICAL HISTORY:  Past Surgical History:   Procedure Laterality Date    CAST APPLICATION Left 5/42/2700    Procedure: APPLICATION THUMB SPICA SPLINT;  Surgeon: Salomon Barrera MD;  [facility];  Service: Orthopedics    CHOLECYSTECTOMY      COLONOSCOPY      GALLBLADDER SURGERY      GASTRIC RESTRICTION SURGERY      HERNIA REPAIR      HYSTERECTOMY      [place] REPAIR INTERCARP/CARP-METACARP JT Left 3/26/2019    Procedure: ARTHROPLASTY [place];  Surgeon: Salomon Barrera MD;  [facility];  Service: Orthopedics    FL WRIST [employer] LIG Left 3/26/2019    Procedure: RELEASE CARPAL TUNNEL ENDOSCOPIC;  Surgeon: Salomon Barrera MD;  [facility];  Service: Orthopedics    TUMOR REMOVAL      arm    WRIST TENDON TRANSFER Left 3/26/2019    Procedure: TRANSFER TENDON HAND/WRIST FCR FOREARM TO THUMB;  Surgeon: Salomon Barrera MD;  [facility];  Service: Orthopedics       FAMILY HISTORY:  Family History   Problem Relation Age of Onset    Heart attack Mother     Asthma Mother     Heart attack Father        SOCIAL HISTORY:  Social History     Tobacco Use    Smoking status: Former Smoker    Smokeless tobacco: Never Used   Substance Use Topics    Alcohol use: Yes     Frequency: Monthly or less    Drug use: Not on file       MEDICATIONS:    Current Outpatient Medications:     allopurinol (ZYLOPRIM) 100 mg tablet, Take 100 mg by mouth 2 (two) times a day, Disp: , Rfl: 3    buPROPion (WELLBUTRIN SR) 150 mg 12 hr tablet, TAKE 1 TABLET BY MOUTH IN THE MORNING FOR 4 DAYS, THEN INCREASE TO 1 TABLET TWICE DAILY, Disp: , Rfl: 3    Calcium Carbonate-Vitamin D 600-400 MG-UNIT per chew tablet, Chew, Disp: , Rfl:     cyanocobalamin (VITAMIN B-12) 100 mcg tablet, Take by mouth, Disp: , Rfl:     escitalopram (LEXAPRO) 10 mg tablet, Take 10 mg by mouth daily, Disp: , Rfl: 3    HYDROcodone-acetaminophen (NORCO) 5-325 mg per tablet, Take 1 tablet by mouth every 6 (six) hours as needed for pain for up to 20 dosesMax Daily Amount: 4 tablets (Patient not taking: Reported on 8/8/2019), Disp: 20 tablet, Rfl: 0    levothyroxine 75 mcg tablet, Take 75 mcg by mouth daily, Disp: , Rfl: 3    naproxen sodium (ALEVE) 220 MG tablet, Take 1 tablet (220 mg total) by mouth 2 (two) times a day with meals for 5 days, Disp: 10 tablet, Rfl: 0    omeprazole (PriLOSEC) 40 MG capsule, Take 40 mg by mouth daily, Disp: , Rfl: 3    torsemide (DEMADEX) 20 mg tablet, Take 20 mg by mouth 2 (two) times a day, Disp: , Rfl: 3    ALLERGIES:  Allergies   Allergen Reactions    Morphine Hcl  [Morphine] Itching       REVIEW OF SYSTEMS:  Pertinent items are noted in HPI  A comprehensive review of systems was negative      LABS:  HgA1c: No results found for: HGBA1C  BMP:   Lab Results   Component Value Date    GLUCOSE 86 10/07/2015    CALCIUM 9 2 02/27/2019     10/07/2015    K 4 1 02/27/2019    CO2 32 02/27/2019     02/27/2019    BUN 19 02/27/2019    CREATININE 0 83 02/27/2019 _____________________________________________________  PHYSICAL EXAMINATION:  Vital signs: There were no vitals taken for this visit  General: well developed and well nourished, alert, oriented times 3 and appears comfortable  Psychiatric: Normal  HEENT: Trachea Midline, No torticollis  Cardiovascular: No discernable arrhythmia  Pulmonary: No wheezing or stridor  Skin: No masses, erthema, lacerations, fluctation, ulcerations  Neurovascular: Sensation Intact to the Median, Ulnar, Radial Nerve, Motor Intact to the Median, Ulnar, Radial Nerve and Pulses Intact    MUSCULOSKELETAL EXAMINATION:  LEFT SIDE:  Patient with well-healed incision  No ttp along previous CMC joint  Can oppose to small finger digital flexion crease  Passively, MP joint hyperextends approx 20 degrees   When actively pinching, there is minimal hyperextension here      _____________________________________________________  STUDIES REVIEWED:  No Studies to review      PROCEDURES PERFORMED:  Procedures  No Procedures performed today

## 2019-09-03 ENCOUNTER — TELEPHONE (OUTPATIENT)
Dept: OBGYN CLINIC | Facility: HOSPITAL | Age: 69
End: 2019-09-03

## 2019-09-03 DIAGNOSIS — M18.12 ARTHRITIS OF CARPOMETACARPAL (CMC) JOINT OF LEFT THUMB: Primary | ICD-10-CM

## 2019-09-03 NOTE — TELEPHONE ENCOUNTER
Tried to call pt but no answer  Left VM for her to call back  Spoke with Dr Shan Coats    He would like pt to only wear her splint at night now  He states she should continue to wear this at night lifelong  Can perform strengthening with therapy at this point without restrictions (will place order for this)  She does not need to follow up with us unless she is having any issues or questions regarding her hand

## 2019-09-09 ENCOUNTER — APPOINTMENT (OUTPATIENT)
Dept: OCCUPATIONAL THERAPY | Facility: MEDICAL CENTER | Age: 69
End: 2019-09-09
Payer: COMMERCIAL

## 2019-09-09 NOTE — PROGRESS NOTES
Daily Note     Today's date: 2019  Patient name: Nabil Cantu  : 1950  MRN: [id number]  Referring provider: Felicity Fonseca PA-C  Dx:   Encounter Diagnosis     ICD-10-CM    1  Pain in thumb joint with movement of left hand M25 542    2  Aftercare following surgery of the musculoskeletal system Z47 89    3  Arthritis of carpometacarpal [place]) joint of left thumb M18 12                   Subjective: I want to continue to come to therapy even when i'm working    Objective: See treatment diary below  Shortened session due to another appt       Manual  8/15 8/20 8/29 6/10 6/13  6/17  6/20  6/27  7/1  8/13   stm 8' 8'  5' 5'  5' 10'  10'  5'  5'   Edema mgt          5'          PROM         3'             IASTM                5'  5'     Scar tissue mgt      3'               splint     10'                      Exercise Diary  8/15 8/20 8/22 9/9 7/15 7/18 7/22 8/1  8/5  8/13   HG level 2 Red in, level 3 out Red in,level 3 out   Red in, level 3 out   Pegs green in level 3 out    Pegs, red in, level 3 out  pegs in red,  level 3 out  pegs in red, level 3 out   EDC tennis ball   5xs/each RB           1x  1x   Pinch Ring  G/B 2x G/B 2x  y/y 2x, w/o splint  R/R without splint G/B Tripod G/B tripod  G/B LF&RF and key  G/B x2  G/B x2  G/B x2    Thumb flexion  blue 3x10  Blue 3x10  B 3x10  Green 3x10  Blue 3x10    Blue 3x10  Blue 3x10  Blue 3x10 Blue 3x10   Gross Grasp Black 3x10  Black 3x10 Black 3x10  Black 3x10  Black 3x10  Black 3x10       Black 3x10   FPLcizer 2x50 2x50  50x 65x  2x50 2x50 2x50 2x50   EDC             Lifting Deferred  15# 3x10 15# 3x10, 10# overhead 2x10 15# 2x10, 10# overhead 10x 15# 2x10, 10# over head 2x10  15# 2x10, 10# overhead 15# 2x10, 10# overhead Deferred          Modalities  8/15 8/20 8/22 8/29 7/15 7/18 7/22  8/1  8/5  8/13    5' 5' 5' 15' 15' 15'  5'  5' 10'  10'    Paraffin                                                            Assessment: See RE    Plan: Continue per plan of care   F/U with Dr Eddy Barrett 8/30/19  Plan is to RTW 9/3/19

## 2019-09-16 ENCOUNTER — OFFICE VISIT (OUTPATIENT)
Dept: OCCUPATIONAL THERAPY | Facility: MEDICAL CENTER | Age: 69
End: 2019-09-16
Payer: COMMERCIAL

## 2019-09-16 DIAGNOSIS — M18.12 ARTHRITIS OF CARPOMETACARPAL (CMC) JOINT OF LEFT THUMB: ICD-10-CM

## 2019-09-16 PROCEDURE — 97530 THERAPEUTIC ACTIVITIES: CPT

## 2019-09-16 PROCEDURE — 97110 THERAPEUTIC EXERCISES: CPT

## 2019-09-16 NOTE — PROGRESS NOTES
Daily Note     Today's date: 2019  Patient name: Jeremi Quintero  : 1950  MRN: [id number]  Referring provider: Farrukh Parisi PA-C  Dx:   Encounter Diagnosis     ICD-10-CM    1  Pain in thumb joint with movement of left hand M25 542    2  Aftercare following surgery of the musculoskeletal system Z47 89    3  Arthritis of carpometacarpal [place]) joint of left thumb M18 12                   Subjective: It's been pretty good    Objective: See treatment diary below      Manual  8/15 8/20 8/29 6/10 6/13  6/17  6/20  6/27  7/1  8/13   stm 8' 8'  5' 5'  5' 10'  10'  5'  5'   Edema mgt          5'          PROM         3'             IASTM                5'  5'     Scar tissue mgt      3'               splint     10'                      Exercise Diary  8/15 8/20 8/22 9/9 9/16 7/18 7/22 8/1  8/5  8/13   HG level 2 Red in, level 3 out Red in,level 3 out   Red in, level 3 out Red in, level 3 out  Pegs green in level 3 out    Pegs, red in, level 3 out  pegs in red,  level 3 out  pegs in red, level 3 out   EDC tennis ball   5xs/each RB           1x  1x   Pinch Ring  G/B 2x G/B 2x  y/y 2x, w/o splint  R/R without splint R/G 2x G/B tripod  G/B LF&RF and key  G/B x2  G/B x2  G/B x2    Thumb flexion  blue 3x10  Blue 3x10  B 3x10  Green 3x10  Green 3x10    Blue 3x10  Blue 3x10  Blue 3x10 Blue 3x10   Gross Grasp Black 3x10  Black 3x10 Black 3x10  Black 3x10  Black 3x10  Black 3x10       Black 3x10   FPLcizer 2x50 2x50  50x   2x50 2x50 2x50 2x50   EDC             UBE     5 resist 6 min        Lifting Deferred  15# 3x10 15# 3x10, 10# overhead 2x10 15# 2x10, 10# overhead 10x 15# 2x10, 10# over head 2x10  15# 2x10, 10# overhead 15# 2x10, 10# overhead Deferred          Modalities  8/15 8/20 8/22 8/29 7/15 7/18 7/22  8/1  8/5  8/13    5' 5' 5' 15' 15' 15'  5'  5' 10'  10'    Paraffin                                                            Assessment: No c/o pain or discomfort with any PRE      Plan: D/C next visit

## 2019-09-17 RX ORDER — ESCITALOPRAM OXALATE 20 MG/1
20 TABLET ORAL
Refills: 3 | COMMUNITY
Start: 2019-08-19

## 2019-09-18 ENCOUNTER — OFFICE VISIT (OUTPATIENT)
Dept: OBGYN CLINIC | Facility: HOSPITAL | Age: 69
End: 2019-09-18
Payer: COMMERCIAL

## 2019-09-18 ENCOUNTER — HOSPITAL ENCOUNTER (OUTPATIENT)
Dept: RADIOLOGY | Facility: HOSPITAL | Age: 69
Discharge: HOME | End: 2019-09-18
Attending: ORTHOPAEDIC SURGERY
Payer: COMMERCIAL

## 2019-09-18 VITALS
DIASTOLIC BLOOD PRESSURE: 74 MMHG | WEIGHT: 218 LBS | HEIGHT: 65 IN | HEART RATE: 60 BPM | SYSTOLIC BLOOD PRESSURE: 163 MMHG | BODY MASS INDEX: 36.32 KG/M2

## 2019-09-18 DIAGNOSIS — S46.001A ROTATOR CUFF INJURY, RIGHT, INITIAL ENCOUNTER: ICD-10-CM

## 2019-09-18 DIAGNOSIS — M25.511 ACUTE PAIN OF RIGHT SHOULDER: Primary | ICD-10-CM

## 2019-09-18 DIAGNOSIS — M25.511 RIGHT SHOULDER PAIN, UNSPECIFIED CHRONICITY: ICD-10-CM

## 2019-09-18 PROCEDURE — 20610 DRAIN/INJ JOINT/BURSA W/O US: CPT | Performed by: ORTHOPAEDIC SURGERY

## 2019-09-18 PROCEDURE — 99213 OFFICE O/P EST LOW 20 MIN: CPT | Performed by: ORTHOPAEDIC SURGERY

## 2019-09-18 PROCEDURE — 73030 X-RAY EXAM OF SHOULDER: CPT

## 2019-09-18 RX ORDER — LIDOCAINE HYDROCHLORIDE 10 MG/ML
2 INJECTION, SOLUTION INFILTRATION; PERINEURAL
Status: COMPLETED | OUTPATIENT
Start: 2019-09-18 | End: 2019-09-18

## 2019-09-18 RX ORDER — BUPIVACAINE HYDROCHLORIDE 2.5 MG/ML
2 INJECTION, SOLUTION INFILTRATION; PERINEURAL
Status: COMPLETED | OUTPATIENT
Start: 2019-09-18 | End: 2019-09-18

## 2019-09-18 RX ORDER — BETAMETHASONE SODIUM PHOSPHATE AND BETAMETHASONE ACETATE 3; 3 MG/ML; MG/ML
12 INJECTION, SUSPENSION INTRA-ARTICULAR; INTRALESIONAL; INTRAMUSCULAR; SOFT TISSUE
Status: COMPLETED | OUTPATIENT
Start: 2019-09-18 | End: 2019-09-18

## 2019-09-18 RX ADMIN — BETAMETHASONE SODIUM PHOSPHATE AND BETAMETHASONE ACETATE 12 MG: 3; 3 INJECTION, SUSPENSION INTRA-ARTICULAR; INTRALESIONAL; INTRAMUSCULAR; SOFT TISSUE at 14:32

## 2019-09-18 RX ADMIN — BUPIVACAINE HYDROCHLORIDE 2 ML: 2.5 INJECTION, SOLUTION INFILTRATION; PERINEURAL at 14:32

## 2019-09-18 RX ADMIN — LIDOCAINE HYDROCHLORIDE 2 ML: 10 INJECTION, SOLUTION INFILTRATION; PERINEURAL at 14:32

## 2019-09-18 NOTE — PROGRESS NOTES
Assessment:   Diagnosis ICD-10-CM Associated Orders   1  Acute pain of right shoulder M25 511 XR shoulder 2+ vw right     Large joint arthrocentesis: R subacromial bursa   2  Rotator cuff injury, right, initial encounter S46 001A Large joint arthrocentesis: R subacromial bursa       Plan:  Diagnostics reviewed and physical exam performed  Diagnosis, treatment options and associated risks were discussed with the patient including no treatment, nonsurgical treatment and potential for surgical intervention  The patient was given the opportunity to ask questions regarding each  Based on her symptoms and exam findings appears that she injured her rotator cuff but has decent strength  Patient was offered, accepted, performed a subacromial cortisone injection today for symptomatic relief  Ice and post injection protocol advised  Activities as tolerated  Follow up in 6 weeks time to assess today's injection if no improvement may order an MRI for further evaluation      To do next visit:  Return in about 6 weeks (around 10/30/2019) for re-check efficacy of today's injection   The above stated was discussed in layman's terms and the patient expressed understanding  All questions were answered to the patient's satisfaction  Scribe Attestation    I,:   Melba Salazar am acting as a scribe while in the presence of the attending physician :        I,:   Lisa Arellano MD personally performed the services described in this documentation    as scribed in my presence :              Subjective:   Fani Oliveros is a 81 y o  RHD female who presents for initial evaluation of her right shoulder  About 2 weeks ago she was assisting her  on stairs when he lost his balance and almost fell  She caught him in an awkward position resulting in her shoulder pain  Her pain is laterally at her upper arm  Her job entails her to do repetitive lifting at or above shoulder level  Denies any neck pain, or numbness or tingling  Review of systems negative unless otherwise specified in HPI    Past Medical History:   Diagnosis Date    [address] [address] )     Disease of thyroid gland     GERD (gastroesophageal reflux disease)     PONV (postoperative nausea and vomiting)        Past Surgical History:   Procedure Laterality Date    CAST APPLICATION Left 6/09/2537    Procedure: APPLICATION THUMB SPICA SPLINT;  Surgeon: Timur Christine MD;  [facility];  Service: Orthopedics    CHOLECYSTECTOMY      COLONOSCOPY      GALLBLADDER SURGERY      GASTRIC RESTRICTION SURGERY      HERNIA REPAIR      HYSTERECTOMY      CO REPAIR INTERCARP/CARP-METACARP JT Left 3/26/2019    Procedure: Cloria Going;  Surgeon: Timur Christine MD;  [facility];  Service: Orthopedics    CO WRIST [employer] LIG Left 3/26/2019    Procedure: RELEASE CARPAL TUNNEL ENDOSCOPIC;  Surgeon: Timur Christine MD;  [facility];  Service: Orthopedics    TUMOR REMOVAL      arm    WRIST TENDON TRANSFER Left 3/26/2019    Procedure: TRANSFER TENDON HAND/WRIST FCR FOREARM TO THUMB;  Surgeon: Timur Christine MD;  [facility];  Service: Orthopedics       Family History   Problem Relation Age of Onset    Heart attack Mother     Asthma Mother     Heart attack Father        Social History     Occupational History    Not on file   Tobacco Use    Smoking status: Former Smoker    Smokeless tobacco: Never Used   Substance and Sexual Activity    Alcohol use: Yes     Frequency: Monthly or less    Drug use: Not on file    Sexual activity: Not on file         Current Outpatient Medications:     allopurinol (ZYLOPRIM) 100 mg tablet, Take 100 mg by mouth 2 (two) times a day, Disp: , Rfl: 3    buPROPion (WELLBUTRIN SR) 150 mg 12 hr tablet, TAKE 1 TABLET BY MOUTH IN THE MORNING FOR 4 DAYS, THEN INCREASE TO 1 TABLET TWICE DAILY, Disp: , Rfl: 3    Calcium Carbonate-Vitamin D 600-400 MG-UNIT per chew tablet, Chew, Disp: , Rfl:     cyanocobalamin (VITAMIN B-12) 100 mcg tablet, Take by mouth, Disp: , Rfl:     escitalopram (LEXAPRO) 10 mg tablet, Take 10 mg by mouth daily, Disp: , Rfl: 3    escitalopram (LEXAPRO) 20 mg tablet, Take 20 mg by mouth daily, Disp: , Rfl: 3    HYDROcodone-acetaminophen (NORCO) 5-325 mg per tablet, Take 1 tablet by mouth every 6 (six) hours as needed for pain for up to 20 dosesMax Daily Amount: 4 tablets, Disp: 20 tablet, Rfl: 0    levothyroxine 75 mcg tablet, Take 75 mcg by mouth daily, Disp: , Rfl: 3    naproxen sodium (ALEVE) 220 MG tablet, Take 1 tablet (220 mg total) by mouth 2 (two) times a day with meals for 5 days, Disp: 10 tablet, Rfl: 0    omeprazole (PriLOSEC) 40 MG capsule, Take 40 mg by mouth daily, Disp: , Rfl: 3    torsemide (DEMADEX) 20 mg tablet, Take 20 mg by mouth 2 (two) times a day, Disp: , Rfl: 3    Allergies   Allergen Reactions    Morphine Hcl  [Morphine] Itching            Vitals:    09/18/19 1349   BP: 163/74   Pulse: 60       Objective:                    Right Shoulder Exam     Tenderness   Right shoulder tenderness location: lateral cuff over greater tuberosity  Range of Motion   Active abduction: 160 (with pain)   Forward flexion: 160     Muscle Strength   Abduction: 4/5   Internal rotation: 5/5   External rotation: 4/5   Supraspinatus: 4/5     Tests   Impingement: positive    Other   Erythema: absent  Sensation: normal    Comments:    + empty can test    Good neck ROM without recreation of her right upper extremity symptoms    Full elbow ROM and strength      Left Shoulder Exam     Range of Motion   Active abduction: 160   Forward flexion: 160     Muscle Strength   The patient has normal left shoulder strength  Diagnostics, reviewed and taken today if performed as documented:     The attending physician has personally reviewed the pertinent films in PACS and interpretation is as follows:    Right shoulder x-rays taken and reviewed in the office today show:  No acute fracture dislocation, degenerative changes of the [facility] joint, cystic changes greater tuberosity  Type 2 acromion      Procedures, if performed today:    Large joint arthrocentesis: R subacromial bursa  Date/Time: 9/18/2019 2:32 PM  Consent given by: patient  Site marked: site marked  Supporting Documentation  Indications: pain   Procedure Details  Location: shoulder - R subacromial bursa  Preparation: Patient was prepped and draped in the usual sterile fashion  Needle size: 22 G  Ultrasound guidance: no  Approach: posterior  Medications administered: 12 mg betamethasone acetate-betamethasone sodium phosphate 6 (3-3) mg/mL; 2 mL bupivacaine 0 25 %; 2 mL lidocaine 1 %    Patient tolerance: patient tolerated the procedure well with no immediate complications  Dressing:  Sterile dressing applied              Portions of the record may have been created with voice recognition software  Occasional wrong word or "sound a like" substitutions may have occurred due to the inherent limitations of voice recognition software  Read the chart carefully and recognize, using context, where substitutions have occurred

## 2019-09-23 ENCOUNTER — APPOINTMENT (OUTPATIENT)
Dept: OCCUPATIONAL THERAPY | Facility: MEDICAL CENTER | Age: 69
End: 2019-09-23
Payer: COMMERCIAL

## 2019-09-23 NOTE — PROGRESS NOTES
OT Discharge     Today's date: 2019  Patient name: Kiarra Gonsalez  : 1950  MRN: [id number]  Referring provider: Walt Lu PA-C  Dx:   Encounter Diagnosis     ICD-10-CM    1  Arthritis of carpometacarpal (CMC) joint of left thumb M18 12    2  Pain in thumb joint with movement of left hand M25 542                   Assessment  Assessment details: 80 yo RHD female POD16  She presents with steristrips at base of [facility] also performed  Physically, she has impaired skin integrity, decreased ROM/strength and increased pain/edema  Functionally, she is not using her left hand  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, 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 and is currently not driving  Patient would benefit from skilled OT in order to resume pain free [place] in ADL's, IADL's and work tasks  19:  Patient has made gains in AROM, edema, skin integrity and functional status  She is not lifting weighted items  She is performing light activities  She is starting to wean from her splint  Patient would benefit from cont skilled OT in order to return to work full duty and care for her  who has Alzheirmer's Disease  : Tammie Whitlock has had a slight decline in AROM, however her strength has greatly improved  She notes she does have pain lifting heavier items but overall she feels that lifting is getting easier  Her scar density is continuing to decrease and the proximal part of her scar is no longer adhered  She would benefit from cont  OT in order to continue to increase her strength and decrease her pain to allow her complete her ADL/IADLs without deficit and so she can RTW      : Linda's IP motion has greatly increased  She currently presents with symptoms in line with a volar plate avulsion and she has been placed in a dorsal blocking splint per MD to be worn at all times   Her pinch strength has increased, however her  strength slightly declined  She has begun PREs focused on the tasks at her job to prepare her to RTW  She would benefit from OT focused on work-hardening to allow to her continue to gain strength to allow her to RTW safely  8/29: Linda's ROM and strength is WFL  She has been compliant wearing her dorsal blocking MP splint which has decreased her pain, increased her function, and decreased her MP hypermobility  She would like to continue therapy 1x a week when she goes back to work in order to continue her work hardening program as it was put on hold due to problems at home  Recommend OT 1x per week for the next 4 weeks to help transition back to work  Impairments: abnormal or restricted ROM, impaired physical strength, lacks appropriate home exercise program and pain with function  Understanding of Dx/Px/POC: good   Prognosis: good    Goals  STG:  In 3 weeks  1  Decreased pain to 2-3/10 when performing ADL's  Met  2  Increased ROM 5-15 degrees in order to independently perform light weight IADL's met  3  Improve FOTO score 10%  met    LTG's 8 weeks  1    Patient will report minimal to no pain with all activities  2   Sufficient strength to perform ADL's and IADL's  New STG (6/24):  1  Increase  strength by 5 pounds in 4 weeks MET  2  Increase pinch strength by 2 pounds in 4 weeks  MET  3  Pain with lifting decreased to 1-2/10 in 4 weeks   MET    Plan  Patient would benefit from: OT eval, skilled occupational therapy and custom splinting  Planned modality interventions: TENS, thermotherapy: hydrocollator packs, thermotherapy: paraffin bath, ultrasound, fluidotherapy, electrical stimulation/Russian stimulation and cryotherapy  Planned therapy interventions: joint mobilization, manual therapy, patient education, orthotic fitting/training, neuromuscular re-education, coordination, home exercise program, graded exercise, graded activity, functional ROM exercises, therapeutic training, therapeutic exercise, therapeutic activities, stretching and strengthening  Frequency: 2x week  Duration in weeks: 8  Plan of Care beginning date: 2019  Plan of Care expiration date: 10/22/2019  Treatment plan discussed with: patient        Subjective Evaluation    History of Present Illness  Date of surgery: 3/26/2019  Mechanism of injury: surgery  Mechanism of injury: 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  Then my thumb started swelling and I got a really big lump and then I noticed my left thumb was longer then the right  She then went to doctor who recommended hand surgery and she was referred to hand surgeon Hand surgeon noted she needed surgery following review of x-ray and MRI  Recurrent probem    Quality of life: good    Pain  Current pain ratin  At best pain ratin  At worst pain ratin (With extension blocking splint on)  Relieving factors: medications, support and rest  Aggravating factors: lifting  Progression: improved    Social Support  Lives in: multiple-level home  Lives with: spouse    Employment status: not working  Hand dominance: right  Life stress: medium      Diagnostic Tests  X-ray: abnormal  MRI studies: abnormal  Treatments  Previous treatment: immobilization  Patient Goals  Patient goals for therapy: decreased edema, increased strength and decreased pain  Patient goal: to use my hand normally          Objective     Active Range of Motion     Left Wrist   Wrist flexion: 50 degrees   Wrist extension: 60 degrees     Left Thumb   Flexion     MP: 42 degrees    DIP: 80 degrees  Palmar Abduction     CMC: 62 degrees  Radial abduction    CMC: 65 degrees    Strength/Myotome Testing     Left Wrist/Hand      (2nd hand position)     Trial 1: 40    Thumb Strength  Key/Lateral Pinch     Trail 1: 14  Tip/Two-Point Pinch     Trial 1: 10  Palmar/Three-Point Pinch     Trial 1: 10    Right Wrist/Hand      (2nd hand position)     Trial 1: 55    Thumb Strength   Key/Lateral Pinch     Trial 1: 17  Tip/Two-Point Pinch     Trial 1: 11  Palmar/Three-Point Pinch     Trial 1: 12

## 2019-11-01 ENCOUNTER — OFFICE VISIT (OUTPATIENT)
Dept: OBGYN CLINIC | Facility: MEDICAL CENTER | Age: 69
End: 2019-11-01
Payer: COMMERCIAL

## 2019-11-01 VITALS
SYSTOLIC BLOOD PRESSURE: 151 MMHG | HEART RATE: 56 BPM | DIASTOLIC BLOOD PRESSURE: 82 MMHG | HEIGHT: 65 IN | BODY MASS INDEX: 36.32 KG/M2 | WEIGHT: 218 LBS

## 2019-11-01 DIAGNOSIS — M25.511 CHRONIC RIGHT SHOULDER PAIN: ICD-10-CM

## 2019-11-01 DIAGNOSIS — M24.811 INTERNAL DERANGEMENT OF SHOULDER, RIGHT: Primary | ICD-10-CM

## 2019-11-01 DIAGNOSIS — G89.29 CHRONIC RIGHT SHOULDER PAIN: ICD-10-CM

## 2019-11-01 PROCEDURE — 99213 OFFICE O/P EST LOW 20 MIN: CPT | Performed by: ORTHOPAEDIC SURGERY

## 2019-11-01 PROCEDURE — 20610 DRAIN/INJ JOINT/BURSA W/O US: CPT | Performed by: ORTHOPAEDIC SURGERY

## 2019-11-01 RX ORDER — LIDOCAINE HYDROCHLORIDE 20 MG/ML
2 INJECTION, SOLUTION EPIDURAL; INFILTRATION; INTRACAUDAL; PERINEURAL
Status: COMPLETED | OUTPATIENT
Start: 2019-11-01 | End: 2019-11-01

## 2019-11-01 RX ORDER — BETAMETHASONE SODIUM PHOSPHATE AND BETAMETHASONE ACETATE 3; 3 MG/ML; MG/ML
12 INJECTION, SUSPENSION INTRA-ARTICULAR; INTRALESIONAL; INTRAMUSCULAR; SOFT TISSUE
Status: COMPLETED | OUTPATIENT
Start: 2019-11-01 | End: 2019-11-01

## 2019-11-01 RX ORDER — BUPIVACAINE HYDROCHLORIDE 2.5 MG/ML
2 INJECTION, SOLUTION INFILTRATION; PERINEURAL
Status: COMPLETED | OUTPATIENT
Start: 2019-11-01 | End: 2019-11-01

## 2019-11-01 RX ADMIN — BETAMETHASONE SODIUM PHOSPHATE AND BETAMETHASONE ACETATE 12 MG: 3; 3 INJECTION, SUSPENSION INTRA-ARTICULAR; INTRALESIONAL; INTRAMUSCULAR; SOFT TISSUE at 13:31

## 2019-11-01 RX ADMIN — LIDOCAINE HYDROCHLORIDE 2 ML: 20 INJECTION, SOLUTION EPIDURAL; INFILTRATION; INTRACAUDAL; PERINEURAL at 13:31

## 2019-11-01 RX ADMIN — BUPIVACAINE HYDROCHLORIDE 2 ML: 2.5 INJECTION, SOLUTION INFILTRATION; PERINEURAL at 13:31

## 2019-11-01 NOTE — PROGRESS NOTES
Assessment:   Diagnosis ICD-10-CM Associated Orders   1  Internal derangement of shoulder, right M24 811 MRI shoulder right wo contrast       Plan:    Patient was given repeat subacromial CSI to get shoulder back to baseline, she will also go forward with getting MRI as highly suspicious for rotator cuff tear as she presents with weakness, functional overhead limitations, + impingement, Garcia, cross arm, whipple  See back to review MRI and determine further treatment  To do next visit:  No follow-ups on file  The above stated was discussed in layman's terms and the patient expressed understanding  All questions were answered to the patient's satisfaction  Scribe Attestation    I,:   Raheel Magdaleno am acting as a scribe while in the presence of the attending physician :        I,:   Percy Almeida MD personally performed the services described in this documentation    as scribed in my presence :              Subjective:   Sharron Garcia is a 81 y o  female who presents for f/u regarding her right shoulder  Injury  lost balance of stairs and she caught him in awkward position  She was given CSI subacromial 9/18/19  Injection helped for about a month before lateral shoulder pain returned  She is able to lift arm overhead with diffuse pain  She has sharp stabbing pain bringing arm down from overhead position  She has weakness reaching to grab items, overhead  Her job does require lifting boxes weighting 40lbs which also aggravates her shoulder  Her arm feels better at her side and with rest  If she does activity below shoulder level she is okay         Review of systems negative unless otherwise specified in HPI    Past Medical History:   Diagnosis Date    Cancer ([address] )     Disease of thyroid gland     GERD (gastroesophageal reflux disease)     PONV (postoperative nausea and vomiting)        Past Surgical History:   Procedure Laterality Date    CAST APPLICATION Left 3/71/9464    Procedure: APPLICATION THUMB SPICA SPLINT;  Surgeon: George Flores MD;  [facility];  Service: Orthopedics    CHOLECYSTECTOMY      COLONOSCOPY      GALLBLADDER SURGERY      GASTRIC RESTRICTION SURGERY      HERNIA REPAIR      HYSTERECTOMY      MD REPAIR INTERCARP/CARP-METACARP JT Left 3/26/2019    Procedure: Jeovany Ann;  Surgeon: George Flores MD;  [facility];  Service: Orthopedics    MD WRIST Dequan Hutlaly LIG Left 3/26/2019    Procedure: RELEASE CARPAL TUNNEL ENDOSCOPIC;  Surgeon: George Flores MD;  [facility];  Service: Orthopedics    TUMOR REMOVAL      arm    WRIST TENDON TRANSFER Left 3/26/2019    Procedure: TRANSFER TENDON HAND/WRIST FCR FOREARM TO THUMB;  Surgeon: George Flores MD;  [facility];  Service: Orthopedics       Family History   Problem Relation Age of Onset    Heart attack Mother     Asthma Mother     Heart attack Father        Social History     Occupational History    Not on file   Tobacco Use    Smoking status: Former Smoker    Smokeless tobacco: Never Used   Substance and Sexual Activity    Alcohol use: Yes     Frequency: Monthly or less    Drug use: Not on file    Sexual activity: Not on file         Current Outpatient Medications:     allopurinol (ZYLOPRIM) 100 mg tablet, Take 100 mg by mouth 2 (two) times a day, Disp: , Rfl: 3    Calcium Carbonate-Vitamin D 600-400 MG-UNIT per chew tablet, Chew, Disp: , Rfl:     cyanocobalamin (VITAMIN B-12) 100 mcg tablet, Take by mouth, Disp: , Rfl:     escitalopram (LEXAPRO) 10 mg tablet, Take 10 mg by mouth daily, Disp: , Rfl: 3    escitalopram (LEXAPRO) 20 mg tablet, Take 20 mg by mouth daily, Disp: , Rfl: 3    levothyroxine 75 mcg tablet, Take 75 mcg by mouth daily, Disp: , Rfl: 3    omeprazole (PriLOSEC) 40 MG capsule, Take 40 mg by mouth daily, Disp: , Rfl: 3    torsemide (DEMADEX) 20 mg tablet, Take 20 mg by mouth 2 (two) times a day, Disp: , Rfl: 3    Allergies   Allergen Reactions    Morphine Hcl  [Morphine] Itching            Vitals:    11/01/19 1310   BP: 151/82   Pulse: 56       Objective:                    Right Shoulder Exam     Tenderness   Right shoulder tenderness location: lateral cuff  Range of Motion   Passive abduction: 90   External rotation: 70   Forward flexion: 170     Muscle Strength   Abduction: 4/5   Internal rotation: 5/5   External rotation: 4/5     Tests   Garcia test: positive  Cross arm: positive  Impingement: positive    Other   Erythema: absent  Scars: absent  Sensation: normal  Pulse: present            Diagnostics, reviewed and taken today if performed as documented:    None performed            Procedures, if performed today:    Large joint arthrocentesis: R subacromial bursa  Date/Time: 11/1/2019 1:31 PM  Consent given by: patient  Site marked: site marked  Timeout: Immediately prior to procedure a time out was called to verify the correct patient, procedure, equipment, support staff and site/side marked as required   Supporting Documentation  Indications: pain   Procedure Details  Location: shoulder - R subacromial bursa  Needle size: 22 G  Approach: posterolateral  Medications administered: 2 mL bupivacaine 0 25 %; 2 mL lidocaine (PF) 2 %; 12 mg betamethasone acetate-betamethasone sodium phosphate 6 (3-3) mg/mL    Patient tolerance: patient tolerated the procedure well with no immediate complications  Dressing:  Sterile dressing applied          None performed      Portions of the record may have been created with voice recognition software  Occasional wrong word or "sound a like" substitutions may have occurred due to the inherent limitations of voice recognition software  Read the chart carefully and recognize, using context, where substitutions have occurred

## 2019-11-20 ENCOUNTER — HOSPITAL ENCOUNTER (OUTPATIENT)
Dept: MRI IMAGING | Facility: HOSPITAL | Age: 69
Discharge: HOME | End: 2019-11-20
Attending: ORTHOPAEDIC SURGERY
Payer: COMMERCIAL

## 2019-11-20 DIAGNOSIS — M24.811 INTERNAL DERANGEMENT OF SHOULDER, RIGHT: ICD-10-CM

## 2019-11-20 PROCEDURE — 73221 MRI JOINT UPR EXTREM W/O DYE: CPT

## 2020-01-10 ENCOUNTER — OFFICE VISIT (OUTPATIENT)
Dept: OBGYN CLINIC | Facility: MEDICAL CENTER | Age: 70
End: 2020-01-10
Payer: COMMERCIAL

## 2020-01-10 VITALS
SYSTOLIC BLOOD PRESSURE: 191 MMHG | BODY MASS INDEX: 36.32 KG/M2 | DIASTOLIC BLOOD PRESSURE: 76 MMHG | WEIGHT: 218 LBS | HEART RATE: 56 BPM | HEIGHT: 65 IN

## 2020-01-10 DIAGNOSIS — M75.41 IMPINGEMENT SYNDROME OF RIGHT SHOULDER: ICD-10-CM

## 2020-01-10 DIAGNOSIS — M25.511 CHRONIC RIGHT SHOULDER PAIN: Primary | ICD-10-CM

## 2020-01-10 DIAGNOSIS — G89.29 CHRONIC RIGHT SHOULDER PAIN: Primary | ICD-10-CM

## 2020-01-10 PROCEDURE — 99213 OFFICE O/P EST LOW 20 MIN: CPT | Performed by: ORTHOPAEDIC SURGERY

## 2020-01-10 PROCEDURE — 20610 DRAIN/INJ JOINT/BURSA W/O US: CPT | Performed by: ORTHOPAEDIC SURGERY

## 2020-01-10 RX ORDER — LIDOCAINE HYDROCHLORIDE 10 MG/ML
2 INJECTION, SOLUTION INFILTRATION; PERINEURAL
Status: COMPLETED | OUTPATIENT
Start: 2020-01-10 | End: 2020-01-10

## 2020-01-10 RX ORDER — BUPROPION HYDROCHLORIDE 150 MG/1
150 TABLET, EXTENDED RELEASE ORAL 2 TIMES DAILY
COMMUNITY
Start: 2019-10-27

## 2020-01-10 RX ORDER — BETAMETHASONE SODIUM PHOSPHATE AND BETAMETHASONE ACETATE 3; 3 MG/ML; MG/ML
12 INJECTION, SUSPENSION INTRA-ARTICULAR; INTRALESIONAL; INTRAMUSCULAR; SOFT TISSUE
Status: COMPLETED | OUTPATIENT
Start: 2020-01-10 | End: 2020-01-10

## 2020-01-10 RX ORDER — BUPIVACAINE HYDROCHLORIDE 2.5 MG/ML
2 INJECTION, SOLUTION INFILTRATION; PERINEURAL
Status: COMPLETED | OUTPATIENT
Start: 2020-01-10 | End: 2020-01-10

## 2020-01-10 RX ADMIN — BUPIVACAINE HYDROCHLORIDE 2 ML: 2.5 INJECTION, SOLUTION INFILTRATION; PERINEURAL at 15:01

## 2020-01-10 RX ADMIN — LIDOCAINE HYDROCHLORIDE 2 ML: 10 INJECTION, SOLUTION INFILTRATION; PERINEURAL at 15:01

## 2020-01-10 RX ADMIN — BETAMETHASONE SODIUM PHOSPHATE AND BETAMETHASONE ACETATE 12 MG: 3; 3 INJECTION, SUSPENSION INTRA-ARTICULAR; INTRALESIONAL; INTRAMUSCULAR; SOFT TISSUE at 15:01

## 2020-01-10 NOTE — PROGRESS NOTES
Assessment:  1  Chronic right shoulder pain  Ambulatory referral to Physical Therapy   2  Impingement syndrome of right shoulder  Ambulatory referral to Physical Therapy       Plan:  The patient was provided with right subacromial steroid injection  She should initiate physical therapy  She should follow up in 8 weeks  To do next visit:  Return in about 8 weeks (around 3/6/2020)  The above stated was discussed in layman's terms and the patient expressed understanding  All questions were answered to the patient's satisfaction  Scribe Attestation    I,:   Myrtle Smith am acting as a scribe while in the presence of the attending physician :        I,:   Mabel Kimball MD personally performed the services described in this documentation    as scribed in my presence :              Subjective:   Louisa Dixon is a 81 y o  female who presents for follow up of right shoulder  She is s/p right subacromial steroid injection, 11/1/19  Today she complains of generalized right shoulder pain  Lifting items at work aggravates  She has used otc NSAIDs and topicals without benefit          Review of systems negative unless otherwise specified in HPI    Past Medical History:   Diagnosis Date    Cancer ([address] )     Disease of thyroid gland     GERD (gastroesophageal reflux disease)     PONV (postoperative nausea and vomiting)        Past Surgical History:   Procedure Laterality Date    CAST APPLICATION Left 1/70/0229    Procedure: APPLICATION THUMB SPICA SPLINT;  Surgeon: Anthony Bailey MD;  [facility];  Service: Orthopedics    CHOLECYSTECTOMY      COLONOSCOPY      GALLBLADDER SURGERY      GASTRIC RESTRICTION SURGERY      HERNIA REPAIR      HYSTERECTOMY      MT REPAIR INTERCARP/CARP-METACARP JT Left 3/26/2019    Procedure: ARTHROPLASTY Chelsey Narciso;  Surgeon: Anthony Bailey MD;  [facility];  Service: Orthopedics    MT WRIST Ebbie Aw LIG Left 3/26/2019 Procedure: RELEASE CARPAL TUNNEL ENDOSCOPIC;  Surgeon: Jaimie Salazar MD;  [facility];  Service: Orthopedics    TUMOR REMOVAL      arm    WRIST TENDON TRANSFER Left 3/26/2019    Procedure: TRANSFER TENDON HAND/WRIST FCR FOREARM TO THUMB;  Surgeon: Jaimie Salazar MD;  [facility];  Service: Orthopedics       Family History   Problem Relation Age of Onset    Heart attack Mother     Asthma Mother     Heart attack Father        Social History     Occupational History    Not on file   Tobacco Use    Smoking status: Former Smoker    Smokeless tobacco: Never Used   Substance and Sexual Activity    Alcohol use: Yes     Frequency: Monthly or less    Drug use: Never    Sexual activity: Not on file         Current Outpatient Medications:     allopurinol (ZYLOPRIM) 100 mg tablet, Take 100 mg by mouth 2 (two) times a day, Disp: , Rfl: 3    buPROPion (WELLBUTRIN SR) 150 mg 12 hr tablet, TAKE 1 TABLET BY MOUTH IN THE MORNING FOR 4 DAYS, THEN INCREASE TO 1 TABLET TWICE DAILY, Disp: , Rfl:     Calcium Carbonate-Vitamin D 600-400 MG-UNIT per chew tablet, Chew, Disp: , Rfl:     cyanocobalamin (VITAMIN B-12) 100 mcg tablet, Take by mouth, Disp: , Rfl:     escitalopram (LEXAPRO) 10 mg tablet, Take 10 mg by mouth daily, Disp: , Rfl: 3    escitalopram (LEXAPRO) 20 mg tablet, Take 20 mg by mouth daily, Disp: , Rfl: 3    levothyroxine 75 mcg tablet, Take 75 mcg by mouth daily, Disp: , Rfl: 3    omeprazole (PriLOSEC) 40 MG capsule, Take 40 mg by mouth daily, Disp: , Rfl: 3    torsemide (DEMADEX) 20 mg tablet, Take 20 mg by mouth 2 (two) times a day, Disp: , Rfl: 3    Allergies   Allergen Reactions    Morphine Hcl  [Morphine] Itching            Vitals:    01/10/20 1438   BP: (!) 191/76   Pulse: 56       Objective:  Physical exam  · General: Awake, Alert, Oriented  · Eyes: Pupils equal, round and reactive to light  · Heart: regular rate and rhythm  · Lungs: No audible wheezing  · Abdomen: soft                    Ortho Exam   Right shoulder:  TTP over lateral shoulder    Abduction 90  ER 70  Positive rm  Sensation intact    Diagnostics, reviewed and taken today if performed as documented: The attending physician has personally reviewed the pertinent films in PACS and interpretation is as follows:  Right shoulder MRI 11/20/19:  No obvious rotator cuff tears  Procedures, if performed today:    Large joint arthrocentesis: R subacromial bursa  Date/Time: 1/10/2020 3:01 PM  Consent given by: patient  Site marked: site marked  Timeout: Immediately prior to procedure a time out was called to verify the correct patient, procedure, equipment, support staff and site/side marked as required   Supporting Documentation  Indications: pain   Procedure Details  Location: shoulder - R subacromial bursa  Needle size: 25 G  Ultrasound guidance: no  Approach: posterolateral  Medications administered: 2 mL bupivacaine 0 25 %; 12 mg betamethasone acetate-betamethasone sodium phosphate 6 (3-3) mg/mL; 2 mL lidocaine 1 %    Patient tolerance: patient tolerated the procedure well with no immediate complications  Dressing:  Sterile dressing applied               Portions of the record may have been created with voice recognition software  Occasional wrong word or "sound a like" substitutions may have occurred due to the inherent limitations of voice recognition software  Read the chart carefully and recognize, using context, where substitutions have occurred

## 2020-02-03 ENCOUNTER — EVALUATION (OUTPATIENT)
Dept: PHYSICAL THERAPY | Facility: MEDICAL CENTER | Age: 70
End: 2020-02-03
Payer: COMMERCIAL

## 2020-02-03 DIAGNOSIS — M75.41 IMPINGEMENT SYNDROME OF RIGHT SHOULDER: ICD-10-CM

## 2020-02-03 DIAGNOSIS — G89.29 CHRONIC RIGHT SHOULDER PAIN: ICD-10-CM

## 2020-02-03 DIAGNOSIS — M25.511 CHRONIC RIGHT SHOULDER PAIN: ICD-10-CM

## 2020-02-03 PROCEDURE — 97161 PT EVAL LOW COMPLEX 20 MIN: CPT | Performed by: PHYSICAL THERAPIST

## 2020-02-03 PROCEDURE — 97110 THERAPEUTIC EXERCISES: CPT | Performed by: PHYSICAL THERAPIST

## 2020-02-03 NOTE — PROGRESS NOTES
PT Evaluation     Today's date: 2/3/2020  Patient name: Kristina Dutta  : 1950  MRN: [id number]  Referring provider: Phong Mims MD  Dx:   Encounter Diagnosis     ICD-10-CM    1  Chronic right shoulder pain M25 511 Ambulatory referral to Physical Therapy    G89 29    2  Impingement syndrome of right shoulder M75 41 Ambulatory referral to Physical Therapy                  Assessment  Assessment details: Kristina Dutta is a 81 y o  female who presents with Chronic right shoulder pain  Impingement syndrome of right shoulder  Patient presents alert and oriented with the above impairments  Bianka Jeanneff may benefit from PT to addres deficits in order to maximize and return to prior level of function  Prognosis is good given HEP compliance  Please contact me if you have any questions or recommendations  Due to MRI results revealing full thickness tearing; may be appropriate for referral for further examination and treatment options if patient does not respond favorably to PT  Impairments: abnormal or restricted ROM, abnormal movement, activity intolerance, impaired physical strength, lacks appropriate home exercise program and pain with function  Understanding of Dx/Px/POC: fair   Prognosis: fair    Goals  Short Term Goals:   1  Pain decreased 2 ratings in 4 weeks  2  ROM increased 10* in 4 weeks  3  Strength increased 1/2 grade in 4 weeks    Long Term Goals:   1  ADLS/IADLS in related activities improved to maximal level in 8 weeks  2  Work performance improved to maximal level in 8 weeks  3  Reaching is improved to maximal level in 8 weeks  4   [place] with HEP in 8 weeks      Plan  Patient would benefit from: skilled physical therapy  Planned modality interventions: cryotherapy  Planned therapy interventions: home exercise program, functional ROM exercises, flexibility, therapeutic exercise, strengthening, patient education, postural training, neuromuscular re-education and manual therapy  Frequency: 2x week  Duration in weeks: 6  Treatment plan discussed with: patient        Subjective Evaluation    History of Present Illness  Mechanism of injury: Pt reports onset of R shoulder pain several months ago  Pain has progressively worsened and she began seeing Dr Escobar Villafana a few months ago  She has received about 3 injections which provided some temporary relief  She was sent for MRI which according to report reveals supraspinatus and infraspinatus tenonitis w/ full thickness supraspinatus tearing w/ degenerative labral tearing  However, patient states that she was told MRI revealed inflammation  She reports that pain is constant in nature and exacerbated w/ any overhead activity, lifting  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#  She does remain independent w/ daily household chores modifying as able  She c/o sleep disturbance due to pain  She also c/o increasing weakness in RUE      Pain  At best pain ratin  At worst pain rating: 10    Patient Goals  Patient goals for therapy: decreased pain, increased motion, increased strength, independence with ADLs/IADLs and return to work          Objective     Active Range of Motion   Left Shoulder   Flexion: 160 degrees   Abduction: 165 degrees     Right Shoulder   Flexion: 130 degrees   Abduction: 90 degrees     Passive Range of Motion     Right Shoulder   Flexion: 170 degrees   Abduction: 175 degrees   External rotation 45°: 80 degrees   Internal rotation 45°: 50 degrees     Strength/Myotome Testing     Left Shoulder     Planes of Motion   Flexion: 5   Adduction: 5   External rotation at 90°: 5   Internal rotation at 90°: 5     Right Shoulder     Planes of Motion   Flexion: 3-   Abduction: 3-   External rotation at 90°: 4-   Internal rotation at 90°: 4-       Flowsheet Rows      Most Recent Value   PT/OT G-Codes   Current Score  54   Projected Score  65             Precautions: hx cancer, depression      Manual Exercise Diary  2/3            pulleys nv            Table slides flex 10" x10            Table slides scaption 10" x10            Wall slides nv            Supine wand flex nv            Supine wand scaption nv            Doorway pec stretch nv            TB rows nv            TB extensions nv                                                                                                                                                               Modalities

## 2020-02-10 ENCOUNTER — OFFICE VISIT (OUTPATIENT)
Dept: PHYSICAL THERAPY | Facility: MEDICAL CENTER | Age: 70
End: 2020-02-10
Payer: COMMERCIAL

## 2020-02-10 DIAGNOSIS — M75.41 IMPINGEMENT SYNDROME OF RIGHT SHOULDER: ICD-10-CM

## 2020-02-10 DIAGNOSIS — G89.29 CHRONIC RIGHT SHOULDER PAIN: Primary | ICD-10-CM

## 2020-02-10 DIAGNOSIS — M25.511 CHRONIC RIGHT SHOULDER PAIN: Primary | ICD-10-CM

## 2020-02-10 PROCEDURE — 97140 MANUAL THERAPY 1/> REGIONS: CPT | Performed by: PHYSICAL THERAPIST

## 2020-02-10 PROCEDURE — 97110 THERAPEUTIC EXERCISES: CPT | Performed by: PHYSICAL THERAPIST

## 2020-02-10 NOTE — PROGRESS NOTES
Daily Note     Today's date: 2/10/2020  Patient name: Jagruti Putnam  : 1950  MRN: [id number]  Referring provider: Nubia Steele MD  Dx:   Encounter Diagnosis     ICD-10-CM    1  Chronic right shoulder pain M25 511     G89 29    2  Impingement syndrome of right shoulder M75 41                   Subjective: Pt c/o severe pain in upper arm that continues to worsen w/ lifting at work  Objective: See treatment diary below  Precautions: hx cancer, depression      Manual   2/10           Ktape Deltoid Y strip 50% stretch  MM           KTape I strip humeral head relocation support 75% support  MM                                                      Exercise Diary  2/3 2/10           pulleys nv 5 min           Table slides flex 10" x10 10" x10           Table slides scaption 10" x10 np pain           Wall slides nv 10" x10           Supine wand flex nv 10" x10           Supine wand scaption nv np pain           Doorway pec stretch nv np           TB rows nv RTB 5"x10           TB extensions nv RTB 5"X10                                                                                                                                                              Modalities                                                                Assessment: Tolerated treatment fair  Patient demonstrated fatigue post treatment, exhibited good technique with therapeutic exercises and would benefit from continued PT   Trial Ktape today to assist w/ stability especially for lifting at work  Assess response next visit  Pt does c/o pain throughout treatment; advised to perform all exercise to tolerance only  Plan: Continue per plan of care

## 2020-02-11 ENCOUNTER — APPOINTMENT (OUTPATIENT)
Dept: PHYSICAL THERAPY | Facility: MEDICAL CENTER | Age: 70
End: 2020-02-11
Payer: COMMERCIAL

## 2020-02-13 ENCOUNTER — OFFICE VISIT (OUTPATIENT)
Dept: PHYSICAL THERAPY | Facility: MEDICAL CENTER | Age: 70
End: 2020-02-13
Payer: COMMERCIAL

## 2020-02-13 DIAGNOSIS — G89.29 CHRONIC RIGHT SHOULDER PAIN: Primary | ICD-10-CM

## 2020-02-13 DIAGNOSIS — M75.41 IMPINGEMENT SYNDROME OF RIGHT SHOULDER: ICD-10-CM

## 2020-02-13 DIAGNOSIS — M25.511 CHRONIC RIGHT SHOULDER PAIN: Primary | ICD-10-CM

## 2020-02-13 PROCEDURE — 97140 MANUAL THERAPY 1/> REGIONS: CPT | Performed by: PHYSICAL THERAPIST

## 2020-02-13 PROCEDURE — 97110 THERAPEUTIC EXERCISES: CPT | Performed by: PHYSICAL THERAPIST

## 2020-02-13 NOTE — PROGRESS NOTES
Daily Note and Discharge    Today's date: 2020  Patient name: Tonia Montes  : 1950  MRN: [id number]  Referring provider: Gaurav Sumner MD  Dx:   Encounter Diagnosis     ICD-10-CM    1  Chronic right shoulder pain M25 511     G89 29    2  Impingement syndrome of right shoulder M75 41                   Subjective:  Pt reports slight improvement w/ lifting at work wearing tape  Otherwise, pain unchanged  Objective: See treatment diary below  Precautions: hx cancer, depression      Manual   2/10 2/13          Ktape Deltoid Y strip 50% stretch  MM MM          KTape I strip humeral head relocation support 75% support  MM MM                                                     Exercise Diary  2/3 2/10 2/13          pulleys nv 5 min 5 min          Table slides flex 10" x10 10" x10 10" x10          Table slides scaption 10" x10 np pain np          Wall slides nv 10" x10 10" x10          Supine wand flex nv 10" x10 np          Supine wand scaption nv np pain np          Doorway pec stretch nv np np          TB rows nv RTB 5"x10 RTB 5"x20          TB extensions nv RTB 5"X10 RTB 5"x20                                                                                                                                                             Modalities                                                                Assessment: Tolerated treatment fair  Patient demonstrated fatigue post treatment, exhibited good technique with therapeutic exercises and would benefit from continued PT   Limited w/ exercise due to pain  Plan: Continue per plan of care  Pt w/ no improvement    Scheduled for surgery

## 2020-02-17 ENCOUNTER — OFFICE VISIT (OUTPATIENT)
Dept: PHYSICAL THERAPY | Facility: MEDICAL CENTER | Age: 70
End: 2020-02-17
Payer: COMMERCIAL

## 2020-02-17 DIAGNOSIS — M75.41 IMPINGEMENT SYNDROME OF RIGHT SHOULDER: ICD-10-CM

## 2020-02-17 DIAGNOSIS — M25.511 CHRONIC RIGHT SHOULDER PAIN: Primary | ICD-10-CM

## 2020-02-17 DIAGNOSIS — G89.29 CHRONIC RIGHT SHOULDER PAIN: Primary | ICD-10-CM

## 2020-02-17 NOTE — PROGRESS NOTES
Daily Note     Today's date: 2020  Patient name: Jett Dewitt  : 1950  MRN: [id number]  Referring provider: Helder Juarez MD  Dx:   Encounter Diagnosis     ICD-10-CM    1  Chronic right shoulder pain M25 511     G89 29    2  Impingement syndrome of right shoulder M75 41                   Subjective:  Pt reports no improvement w/ PT  Increased pain levels persisting  She is having more difficulty lifting at work due to pain and weakness  Unable to perform job duties as previously  Objective: See treatment diary below  Precautions: hx cancer, depression      Manual   2/10 2/13 2/17         Ktape Deltoid Y strip 50% stretch  MM MM MM         KTape I strip humeral head relocation support 75% support  MM MM MM         Ktape I strip for deltoid bursa    MM                                       Exercise Diary  2/3 2/10 2/13          pulleys nv 5 min 5 min          Table slides flex 10" x10 10" x10 10" x10          Table slides scaption 10" x10 np pain np          Wall slides nv 10" x10 10" x10          Supine wand flex nv 10" x10 np          Supine wand scaption nv np pain np          Doorway pec stretch nv np np          TB rows nv RTB 5"x10 RTB 5"x20          TB extensions nv RTB 5"X10 RTB 5"x20                                                                                                                                                             Modalities                                                                Assessment: Tolerated treatment well  Patient shoulder was taped today and instructed on taping method by CARMINA Ortez PT   She does report compliance w/ HEP w/out improvement  Any exercise progressions are exacerbating pain  There are also issues w/ patient's insurance at this time  Advised to hold PT due to worsening pain and scheduled appt to f/u w/ MD for further evaluation          Plan: hold PT at this time

## 2020-02-20 ENCOUNTER — PREP FOR PROCEDURE (OUTPATIENT)
Dept: OBGYN CLINIC | Facility: OTHER | Age: 70
End: 2020-02-20

## 2020-02-20 ENCOUNTER — OFFICE VISIT (OUTPATIENT)
Dept: OBGYN CLINIC | Facility: OTHER | Age: 70
End: 2020-02-20
Payer: COMMERCIAL

## 2020-02-20 ENCOUNTER — APPOINTMENT (OUTPATIENT)
Dept: PHYSICAL THERAPY | Facility: MEDICAL CENTER | Age: 70
End: 2020-02-20
Payer: COMMERCIAL

## 2020-02-20 VITALS
SYSTOLIC BLOOD PRESSURE: 155 MMHG | HEIGHT: 65 IN | DIASTOLIC BLOOD PRESSURE: 81 MMHG | WEIGHT: 217 LBS | BODY MASS INDEX: 36.15 KG/M2 | HEART RATE: 50 BPM

## 2020-02-20 DIAGNOSIS — Z01.812 PRE-OPERATIVE LABORATORY EXAMINATION: Primary | ICD-10-CM

## 2020-02-20 DIAGNOSIS — M75.101 TEAR OF RIGHT SUPRASPINATUS TENDON: Primary | ICD-10-CM

## 2020-02-20 PROCEDURE — 99214 OFFICE O/P EST MOD 30 MIN: CPT | Performed by: ORTHOPAEDIC SURGERY

## 2020-02-20 NOTE — PROGRESS NOTES
Assessment  Diagnoses and all orders for this visit:    Tear of right supraspinatus tendon        Discussion and Plan:    · A thorough discussion was performed with the patient regarding the risks and benefit of operative and nonoperative treatment of their rotator cuff tear  Risks discussed include but were not limited to infection, neurovascular injury, recurrent tear, nonhealing of the repair, need for further surgery, need for biceps tenodesis or tenotomy, stiffness, need for prolonged rehabilitation, as well as the risk of anesthesia  After this discussion all questions were answered and informed consent was obtained in the office for arthroscopic rotator cuff repair of the right shoulder  The patient will be scheduled for this procedure accordingly  · Instructed patient to continue to perform HEP until surgical procedure is performed to avoid adhesive capsulitis  · Follow up post operatively with Aracelis Ramos PA-C    Subjective:   Patient ID: Louanne Fabry is a 81 y o  female      The patient presents with a chief complaint of right shoulder pain  The pain began several month(s) ago and is associated with an acute injury  She states that in September she was helping her  up stairs and he almost fell and she caught him awkardly  The patient describes the pain as aching, dull and sharp in intensity,  constant in timing, and localizes the pain to the  right subacromial joint, glenohumeral joint  The pain is worse with movement, overhead work, overuse and raising arm over head and relieved by rest, ice, avoiding the painful activities  The pain is not associated with numbness and tingling  The pain is not associated with constitutional symptoms  The patient is awoken at night by the pain  The patient has had prior treatment in the form of 2 cortisone injections and formal physical therapy/home exercise program without benefit                 The following portions of the patient's history were reviewed and updated as appropriate: allergies, current medications, past family history, past medical history, past social history, past surgical history and problem list     Review of Systems   Constitutional: Negative for chills, fever and unexpected weight change  HENT: Negative for hearing loss, nosebleeds and sore throat  Eyes: Negative for pain, redness and visual disturbance  Respiratory: Negative for cough, shortness of breath and wheezing  Cardiovascular: Negative for chest pain, palpitations and leg swelling  Gastrointestinal: Negative for abdominal distention, nausea and vomiting  Endocrine: Negative for polydipsia and polyuria  Genitourinary: Negative for dysuria and hematuria  Skin: Negative for rash and wound  Neurological: Negative for dizziness, numbness and headaches  Psychiatric/Behavioral: Negative for decreased concentration and suicidal ideas  Objective:  /81   Pulse (!) 50   Ht 5' 5" (1 651 m)   Wt 98 4 kg (217 lb)   BMI 36 11 kg/m²       Right Shoulder Exam     Tenderness   The patient is experiencing no tenderness  Range of Motion   External rotation: 60   Forward flexion: 150   Internal rotation 0 degrees: Lumbar     Muscle Strength   Abduction: 4/5   External rotation: 5/5     Tests   Drop arm: positive    Other   Erythema: absent  Sensation: normal  Pulse: present            Physical Exam   Constitutional: She is oriented to person, place, and time  She appears well-developed and well-nourished  HENT:   Head: Normocephalic and atraumatic  Eyes: Pupils are equal, round, and reactive to light  Neck: Normal range of motion  Neck supple  Cardiovascular: Normal rate, regular rhythm and normal heart sounds  Pulmonary/Chest: Effort normal and breath sounds normal  No respiratory distress  Abdominal: Soft  She exhibits no distension  Neurological: She is alert and oriented to person, place, and time  Skin: Skin is warm and dry  Psychiatric: She has a normal mood and affect  Her behavior is normal  Judgment and thought content normal    Nursing note and vitals reviewed  I have personally reviewed pertinent films in PACS and my interpretation is as follows      MRI Right Shoulder:   Shows a full-thickness anterior leading edge supraspinatus tendon tear with no atrophy    Scribe Attestation    I,:   Manjit Ferreira am acting as a scribe while in the presence of the attending physician :        I,:   Fay Garcia MD personally performed the services described in this documentation    as scribed in my presence :

## 2020-02-20 NOTE — PATIENT INSTRUCTIONS
You are being scheduled for a shoulder arthroscopy to treat your symptoms  Below are some instructions and information on what to expect before and after your surgery  Pre-Surgical Preparation for Arthroscopic Shoulder Surgery: You will be contacted the evening prior to your surgery to confirm the scheduled time of the procedure and when to arrive at the hospital    Do not eat or drink anything after midnight the night before your surgery  Since you are having out-patient surgery, make sure that you have someone who can drive you home later in the day  Also, prepare that person for a long day, as the process of safely preparing for and recovering from the procedure is more time consuming than the actual procedure! As you will be in a sling after surgery, please wear or bring a loose fitting button-down shirt so that you can easily place this over the sling when you leave the surgical suite  This avoids having to place the operative arm in a sleeve  Most patients find that this is the easiest outfit to wear for the first week or so after surgery so you may want to plan accordingly  Most patients find that lying down in bed after shoulder surgery accentuates their discomfort  This is likely related to the effect of gravity on the swelling in the shoulder  As a result, most patients sleep better in a recliner or in bed with pillows propped up behind their back for the first few days or weeks after surgery  It is a good idea to plan for this ahead of time so there will be less hassle getting things set up the night after surgery  What to Expect After Arthroscopic Shoulder Surgery: It is normal to have swelling and discomfort in the shoulder for several days or a week after surgery  It is also normal to have a small amount of drainage from the surgical wounds (especially the first few days after surgery), as we put fluid into the shoulder to visualize the structures during surgery  It is NOT normal to have foul smelling, purulent drainage and if this is noted, please contact the office immediately or proceed to the emergency room for evaluation as this may indicate an infection  Applying ice bags to the shoulder may help with pain that is not controlled by the regional block  Ice should be applied 20-30 minutes at a time, every hour or two  Make sure to put a thin towel or T-shirt next to your skin to avoid direct contact of the ice with the skin  Icing is most helpful in the first 48 hours, although many people find that continuing past this time frame lessens their postoperative pain  Please note that your post-operative dressing is not conductive to ice, so if you need to, it is okay to remove that dressing even the night after surgery and place band-aids over the wounds in order for the ice to take effect  Pain Control    Most patients will receive a nerve block, the local anesthetic may keep your whole arm numb for up to 4 days  You will be given a prescription for narcotic pain medication when you are discharged from the hospital   With the newer nerve block that is being utilized, patients are rarely requiring the use of this narcotic pain medication  If you find you do not tolerate that type of pain medicine well, call our office and we will try another one  In addition to the narcotic pain medication, it is safe to use an anti-inflammatory (unless the patient has a medical condition that would not allow safe use of this mediation)  This includes the Advil, Motrin, Ibuprofen and Alleve category of medications  Simply follow the over the counter dosing on the package and use as indicated as another adjunct  Importantly since these medications are all very similar, use only one of them  Tylenol is a separate medication that can be utilized as well and can be taken at the same time as the other medication or given in a "staggered" manner    Just make sure that you follow the dosing on the over the counter bottle instructions  Also make sure that the pain medication prescribed by Dr Genevieve Read team does not contain acetaminophen (this is found in Percocet and Vicodin)  Typically we do not prescribe those types of pain medications but if for some reason that has been prescribed DO NOT add more Tylenol (acetaminophen) as you could end up taking too much of that medication  As mentioned above, most patients find that lying down accentuates their discomfort  You might sleep better in a recliner, or propped up in bed  Dr Zaheer King encourages patients to safely ambulate around the house as much as possible in the first few days after the procedure as this can help with blood circulation in the legs  While the incidence of blood clots is very rare following shoulder surgery, early ambulation is a great way to help decrease the already low rate  24 hours after the surgery you may remove the bandage and cover incisions with Band-Aids if needed  At that time you may shower, the wounds will have a surgical glue that will protect them from shower water but do not submerge your incisions directly (bathing or swimming) until at least 2 weeks post-operatively  It is safe to let the arm hang at the side and take a shower and put on a shirt without the sling on  Just make sure that you do not use the operative are to reach out and grab anything as that may damage the repair  When you are done showering and getting dressed please return the operative arm to the sling  Unless noted otherwise in your discharge paperwork, Dr Zaheer King uses absorbable sutures so they do not need to be removed  Dr Elly Murrell physician assistant (PA) will see you in the office a few days after the procedure to review the intra-operative findings and to initiate physical therapy if appropriate    A post-operative appointment should have been scheduled for you already, but if for some reason this did not happen, please call the office to make one  Physical therapy is important after nearly all shoulder surgeries and a detailed rehabilitation plan based on the specific intra-operative findings and procedures will be provided to your therapist at the first post-operative office visit  Most patients have post-operative therapy appointments scheduled pre-operatively, but if you do not, that will be handled at the first post-operative office visit  Unless expressly directed otherwise it is safe to remove the sling even the first day after the surgery and let the arm hang by the side  This allows patients to shower and even put a shirt on (bad arm in the sleeve first)  It is also safe to flex and extend their wrist, hand and fingers as much as possible when the block wears off  These simple motions can serve to pump fluid out of the forearm and decrease swelling in the arm

## 2020-02-20 NOTE — LETTER
February 20, 2020     Patient: Lauryn Tello   YOB: 1950   Date of Visit: 2/20/2020       To Whom it May Concern:    Kiera Guerrero is under my professional care  She was seen in my office on 2/20/2020  She is going to be undergoing a surgical procedure to repair her rotator cuff at a later date  The post operative period will be 6 weeks protection in a sling with no use of her right upper extremity  6 weeks of progressing ROM as tolerated and then 6 weeks of progressive strengthening as tolerated  If you have any questions or concerns, please don't hesitate to call           Sincerely,          Dilshad Unger MD        CC: No Recipients

## 2020-02-24 ENCOUNTER — APPOINTMENT (OUTPATIENT)
Dept: PHYSICAL THERAPY | Facility: MEDICAL CENTER | Age: 70
End: 2020-02-24
Payer: COMMERCIAL

## 2020-02-26 ENCOUNTER — TRANSCRIBE ORDERS (OUTPATIENT)
Dept: ADMINISTRATIVE | Facility: HOSPITAL | Age: 70
End: 2020-02-26

## 2020-02-26 ENCOUNTER — APPOINTMENT (OUTPATIENT)
Dept: LAB | Facility: MEDICAL CENTER | Age: 70
End: 2020-02-26
Payer: COMMERCIAL

## 2020-02-26 DIAGNOSIS — E78.2 MIXED HYPERLIPIDEMIA: ICD-10-CM

## 2020-02-26 DIAGNOSIS — E55.9 VITAMIN D DEFICIENCY: ICD-10-CM

## 2020-02-26 DIAGNOSIS — D51.9 ANEMIA DUE TO VITAMIN B12 DEFICIENCY, UNSPECIFIED B12 DEFICIENCY TYPE: ICD-10-CM

## 2020-02-26 DIAGNOSIS — E03.9 HYPOTHYROIDISM, UNSPECIFIED TYPE: ICD-10-CM

## 2020-02-26 DIAGNOSIS — D53.9 SIMPLE CHRONIC ANEMIA: ICD-10-CM

## 2020-02-26 DIAGNOSIS — D50.9 IRON DEFICIENCY ANEMIA, UNSPECIFIED IRON DEFICIENCY ANEMIA TYPE: ICD-10-CM

## 2020-02-26 DIAGNOSIS — E03.9 HYPOTHYROIDISM, UNSPECIFIED TYPE: Primary | ICD-10-CM

## 2020-02-26 LAB
25(OH)D3 SERPL-MCNC: 53.5 NG/ML (ref 30–100)
ALBUMIN SERPL BCP-MCNC: 3.2 G/DL (ref 3.5–5)
ALP SERPL-CCNC: 79 U/L (ref 46–116)
ALT SERPL W P-5'-P-CCNC: 16 U/L (ref 12–78)
ANION GAP SERPL CALCULATED.3IONS-SCNC: 4 MMOL/L (ref 4–13)
AST SERPL W P-5'-P-CCNC: 10 U/L (ref 5–45)
BASOPHILS # BLD AUTO: 0.05 THOUSANDS/ΜL (ref 0–0.1)
BASOPHILS NFR BLD AUTO: 1 % (ref 0–1)
BILIRUB SERPL-MCNC: 0.36 MG/DL (ref 0.2–1)
BUN SERPL-MCNC: 14 MG/DL (ref 5–25)
CALCIUM SERPL-MCNC: 8.6 MG/DL (ref 8.3–10.1)
CHLORIDE SERPL-SCNC: 111 MMOL/L (ref 100–108)
CHOLEST SERPL-MCNC: 197 MG/DL (ref 50–200)
CO2 SERPL-SCNC: 29 MMOL/L (ref 21–32)
CREAT SERPL-MCNC: 0.76 MG/DL (ref 0.6–1.3)
EOSINOPHIL # BLD AUTO: 0.91 THOUSAND/ΜL (ref 0–0.61)
EOSINOPHIL NFR BLD AUTO: 10 % (ref 0–6)
ERYTHROCYTE [DISTWIDTH] IN BLOOD BY AUTOMATED COUNT: 14.5 % (ref 11.6–15.1)
EST. AVERAGE GLUCOSE BLD GHB EST-MCNC: 120 MG/DL
FERRITIN SERPL-MCNC: 17 NG/ML (ref 8–388)
FOLATE SERPL-MCNC: 14.2 NG/ML (ref 3.1–17.5)
GFR SERPL CREATININE-BSD FRML MDRD: 81 ML/MIN/1.73SQ M
GLUCOSE P FAST SERPL-MCNC: 86 MG/DL (ref 65–99)
HBA1C MFR BLD: 5.8 %
HCT VFR BLD AUTO: 42.5 % (ref 34.8–46.1)
HDLC SERPL-MCNC: 43 MG/DL
HGB BLD-MCNC: 13.4 G/DL (ref 11.5–15.4)
IMM GRANULOCYTES # BLD AUTO: 0.06 THOUSAND/UL (ref 0–0.2)
IMM GRANULOCYTES NFR BLD AUTO: 1 % (ref 0–2)
LDLC SERPL CALC-MCNC: 115 MG/DL (ref 0–100)
LYMPHOCYTES # BLD AUTO: 1.84 THOUSANDS/ΜL (ref 0.6–4.47)
LYMPHOCYTES NFR BLD AUTO: 20 % (ref 14–44)
MCH RBC QN AUTO: 30.2 PG (ref 26.8–34.3)
MCHC RBC AUTO-ENTMCNC: 31.5 G/DL (ref 31.4–37.4)
MCV RBC AUTO: 96 FL (ref 82–98)
MONOCYTES # BLD AUTO: 0.61 THOUSAND/ΜL (ref 0.17–1.22)
MONOCYTES NFR BLD AUTO: 7 % (ref 4–12)
NEUTROPHILS # BLD AUTO: 5.69 THOUSANDS/ΜL (ref 1.85–7.62)
NEUTS SEG NFR BLD AUTO: 61 % (ref 43–75)
NONHDLC SERPL-MCNC: 154 MG/DL
NRBC BLD AUTO-RTO: 0 /100 WBCS
PLATELET # BLD AUTO: 216 THOUSANDS/UL (ref 149–390)
PMV BLD AUTO: 10.1 FL (ref 8.9–12.7)
POTASSIUM SERPL-SCNC: 4.6 MMOL/L (ref 3.5–5.3)
PROT SERPL-MCNC: 6.4 G/DL (ref 6.4–8.2)
RBC # BLD AUTO: 4.43 MILLION/UL (ref 3.81–5.12)
SODIUM SERPL-SCNC: 144 MMOL/L (ref 136–145)
T3FREE SERPL-MCNC: 2.41 PG/ML (ref 2.3–4.2)
T4 FREE SERPL-MCNC: 0.84 NG/DL (ref 0.76–1.46)
TRIGL SERPL-MCNC: 197 MG/DL
TSH SERPL DL<=0.05 MIU/L-ACNC: 6.29 UIU/ML (ref 0.36–3.74)
VIT B12 SERPL-MCNC: 463 PG/ML (ref 100–900)
WBC # BLD AUTO: 9.16 THOUSAND/UL (ref 4.31–10.16)

## 2020-02-26 PROCEDURE — 80053 COMPREHEN METABOLIC PANEL: CPT | Performed by: FAMILY MEDICINE

## 2020-02-26 PROCEDURE — 84481 FREE ASSAY (FT-3): CPT

## 2020-02-26 PROCEDURE — 83036 HEMOGLOBIN GLYCOSYLATED A1C: CPT | Performed by: FAMILY MEDICINE

## 2020-02-26 PROCEDURE — 85025 COMPLETE CBC W/AUTO DIFF WBC: CPT | Performed by: FAMILY MEDICINE

## 2020-02-26 PROCEDURE — 82728 ASSAY OF FERRITIN: CPT | Performed by: FAMILY MEDICINE

## 2020-02-26 PROCEDURE — 80061 LIPID PANEL: CPT | Performed by: FAMILY MEDICINE

## 2020-02-26 PROCEDURE — 36415 COLL VENOUS BLD VENIPUNCTURE: CPT | Performed by: FAMILY MEDICINE

## 2020-02-26 PROCEDURE — 82746 ASSAY OF FOLIC ACID SERUM: CPT

## 2020-02-26 PROCEDURE — 84443 ASSAY THYROID STIM HORMONE: CPT | Performed by: FAMILY MEDICINE

## 2020-02-26 PROCEDURE — 82607 VITAMIN B-12: CPT | Performed by: FAMILY MEDICINE

## 2020-02-26 PROCEDURE — 82306 VITAMIN D 25 HYDROXY: CPT | Performed by: FAMILY MEDICINE

## 2020-02-26 PROCEDURE — 84439 ASSAY OF FREE THYROXINE: CPT | Performed by: FAMILY MEDICINE

## 2020-02-27 ENCOUNTER — APPOINTMENT (OUTPATIENT)
Dept: PHYSICAL THERAPY | Facility: MEDICAL CENTER | Age: 70
End: 2020-02-27
Payer: COMMERCIAL

## 2020-03-06 ENCOUNTER — OFFICE VISIT (OUTPATIENT)
Dept: OBGYN CLINIC | Facility: MEDICAL CENTER | Age: 70
End: 2020-03-06
Payer: COMMERCIAL

## 2020-03-06 VITALS
WEIGHT: 217 LBS | HEIGHT: 65 IN | HEART RATE: 60 BPM | DIASTOLIC BLOOD PRESSURE: 83 MMHG | SYSTOLIC BLOOD PRESSURE: 154 MMHG | BODY MASS INDEX: 36.15 KG/M2

## 2020-03-06 DIAGNOSIS — M75.101 TEAR OF RIGHT SUPRASPINATUS TENDON: Primary | ICD-10-CM

## 2020-03-06 PROCEDURE — 99212 OFFICE O/P EST SF 10 MIN: CPT | Performed by: ORTHOPAEDIC SURGERY

## 2020-03-06 RX ORDER — NABUMETONE 750 MG/1
750 TABLET, FILM COATED ORAL 2 TIMES DAILY
Qty: 60 TABLET | Refills: 0 | Status: SHIPPED | OUTPATIENT
Start: 2020-03-06 | End: 2020-07-28

## 2020-03-06 NOTE — PROGRESS NOTES
81 y o female presenting for repeat evaluation for right supraspinatus tendon tear  Patient has been seen by Dr Anselmo Lopez, and scheduled for right rotator cuff repair  She reports pain has not improved  She has been taking aspirin for pain relief, with only mild results  She presents today inquiring about repeat injection of her right shoulder      Review of Systems  Review of systems negative unless otherwise specified in HPI    Past Medical History  Past Medical History:   Diagnosis Date    Cancer ([address] )     Disease of thyroid gland     GERD (gastroesophageal reflux disease)     PONV (postoperative nausea and vomiting)        Past Surgical History  Past Surgical History:   Procedure Laterality Date    CAST APPLICATION Left 5/39/3102    Procedure: APPLICATION THUMB SPICA SPLINT;  Surgeon: Madyson Foster MD;  [facility]: [facility];  Service: Orthopedics    CHOLECYSTECTOMY      COLONOSCOPY      GALLBLADDER SURGERY      GASTRIC RESTRICTION SURGERY      HERNIA REPAIR      HYSTERECTOMY      DC REPAIR INTERCARP/CARP-METACARP JT Left 3/26/2019    Procedure: ARTHROPLASTY Georgia Alvarez;  Surgeon: Madyson Foster MD;  [facility];  Service: Orthopedics    DC WRIST Madelin Heading LIG Left 3/26/2019    Procedure: RELEASE CARPAL TUNNEL ENDOSCOPIC;  Surgeon: Madyson Foster MD;  [facility];  Service: Orthopedics    TUMOR REMOVAL      arm    WRIST TENDON TRANSFER Left 3/26/2019    Procedure: TRANSFER TENDON HAND/WRIST FCR FOREARM TO THUMB;  Surgeon: Madyson Foster MD;  [facility];  Service: Orthopedics       Current Medications  Current Outpatient Medications on File Prior to Visit   Medication Sig Dispense Refill    allopurinol (ZYLOPRIM) 100 mg tablet Take 100 mg by mouth 2 (two) times a day  3    buPROPion (WELLBUTRIN SR) 150 mg 12 hr tablet TAKE 1 TABLET BY MOUTH IN THE MORNING FOR 4 DAYS, THEN INCREASE TO 1 TABLET TWICE DAILY      Calcium Carbonate-Vitamin D 600-400 MG-UNIT per chew tablet Chew      cyanocobalamin (VITAMIN B-12) 100 mcg tablet Take by mouth      escitalopram (LEXAPRO) 10 mg tablet Take 10 mg by mouth daily  3    escitalopram (LEXAPRO) 20 mg tablet Take 20 mg by mouth daily  3    levothyroxine 75 mcg tablet Take 75 mcg by mouth daily  3    omeprazole (PriLOSEC) 40 MG capsule Take 40 mg by mouth daily  3    torsemide (DEMADEX) 20 mg tablet Take 20 mg by mouth 2 (two) times a day  3     No current facility-administered medications on file prior to visit  Recent Labs [facility])  0   Lab Value Date/Time    HCT 42 5 02/26/2020 0720    HCT 43 7 03/04/2015 0846    HGB 13 4 02/26/2020 0720    HGB 14 5 03/04/2015 0846    WBC 9 16 02/26/2020 0720    WBC 6 59 03/04/2015 0846    INR 1 12 01/13/2014 1453    GLUCOSE 86 10/07/2015 0935    HGBA1C 5 8 (H) 02/26/2020 0720         Physical exam  · General: Awake, Alert, Oriented  · Eyes: Pupils equal, round and reactive to light  · Heart: regular rate and rhythm  · Lungs: No audible wheezing  · Abdomen: soft  MSK R shoulder  -Skin intact over the shoulder, no erythema  -TTP over the acromion  -active ROM 0-145 forward flexion with significant pain, 0-100 abduction, 75 external rotation, internal rotation to the T-spine  -passive ROM 0-170 forward flexion, 0-170 abduction  -Positive Jobes sign, negative neers impingement, rm impingement, belly press, cross body adduction  -Motor intact m/r/u/ax/msk  -Sensation intact m/r/u/ax/msk  -2+ rad pulse    Imaging  Imaging reviewed patient  MRI right shoulder revealing full-thickness supraspinatus tear    Procedure  None indicated    Assessment/Plan:   81 y  o female presenting for right shoulder pain due to supraspinatus tear  Patient requesting corticosteroid injection, but given her upcoming surgery will defer injection at this point time  Instead patient was provided a prescription for nabumetone   She may follow up with us on an as needed basis moving forward

## 2020-03-19 ENCOUNTER — TELEPHONE (OUTPATIENT)
Dept: OBGYN CLINIC | Facility: HOSPITAL | Age: 70
End: 2020-03-19

## 2020-03-19 NOTE — TELEPHONE ENCOUNTER
I called patient in regards to disability paperwork  I would like to confirm if the patients wants me to fax this paperwork and mail it to her  I gave her my direct number to call me back

## 2020-05-12 ENCOUNTER — TELEPHONE (OUTPATIENT)
Dept: OBGYN CLINIC | Facility: HOSPITAL | Age: 70
End: 2020-05-12

## 2020-06-03 ENCOUNTER — APPOINTMENT (OUTPATIENT)
Dept: LAB | Facility: MEDICAL CENTER | Age: 70
End: 2020-06-03
Payer: COMMERCIAL

## 2020-06-03 ENCOUNTER — TRANSCRIBE ORDERS (OUTPATIENT)
Dept: ADMINISTRATIVE | Facility: HOSPITAL | Age: 70
End: 2020-06-03

## 2020-06-03 DIAGNOSIS — E78.2 MIXED HYPERLIPIDEMIA: ICD-10-CM

## 2020-06-03 DIAGNOSIS — E03.9 HYPOTHYROIDISM, UNSPECIFIED TYPE: Primary | ICD-10-CM

## 2020-06-03 DIAGNOSIS — R73.03 PRE-DIABETES: ICD-10-CM

## 2020-06-03 DIAGNOSIS — E03.9 HYPOTHYROIDISM, UNSPECIFIED TYPE: ICD-10-CM

## 2020-06-03 LAB
ALBUMIN SERPL BCP-MCNC: 3.3 G/DL (ref 3.5–5)
ALP SERPL-CCNC: 81 U/L (ref 46–116)
ALT SERPL W P-5'-P-CCNC: 18 U/L (ref 12–78)
ANION GAP SERPL CALCULATED.3IONS-SCNC: 4 MMOL/L (ref 4–13)
AST SERPL W P-5'-P-CCNC: 13 U/L (ref 5–45)
BILIRUB SERPL-MCNC: 0.45 MG/DL (ref 0.2–1)
BUN SERPL-MCNC: 11 MG/DL (ref 5–25)
CALCIUM SERPL-MCNC: 8.7 MG/DL (ref 8.3–10.1)
CHLORIDE SERPL-SCNC: 105 MMOL/L (ref 100–108)
CHOLEST SERPL-MCNC: 191 MG/DL (ref 50–200)
CO2 SERPL-SCNC: 30 MMOL/L (ref 21–32)
CREAT SERPL-MCNC: 0.87 MG/DL (ref 0.6–1.3)
EST. AVERAGE GLUCOSE BLD GHB EST-MCNC: 117 MG/DL
GFR SERPL CREATININE-BSD FRML MDRD: 69 ML/MIN/1.73SQ M
GLUCOSE P FAST SERPL-MCNC: 103 MG/DL (ref 65–99)
HBA1C MFR BLD: 5.7 %
HDLC SERPL-MCNC: 42 MG/DL
LDLC SERPL CALC-MCNC: 120 MG/DL (ref 0–100)
NONHDLC SERPL-MCNC: 149 MG/DL
POTASSIUM SERPL-SCNC: 3.5 MMOL/L (ref 3.5–5.3)
PROT SERPL-MCNC: 6.8 G/DL (ref 6.4–8.2)
SODIUM SERPL-SCNC: 139 MMOL/L (ref 136–145)
T3FREE SERPL-MCNC: 2.26 PG/ML (ref 2.3–4.2)
T4 FREE SERPL-MCNC: 0.94 NG/DL (ref 0.76–1.46)
TRIGL SERPL-MCNC: 147 MG/DL
TSH SERPL DL<=0.05 MIU/L-ACNC: 4.25 UIU/ML (ref 0.36–3.74)

## 2020-06-03 PROCEDURE — 84443 ASSAY THYROID STIM HORMONE: CPT | Performed by: FAMILY MEDICINE

## 2020-06-03 PROCEDURE — 84481 FREE ASSAY (FT-3): CPT

## 2020-06-03 PROCEDURE — 36415 COLL VENOUS BLD VENIPUNCTURE: CPT | Performed by: FAMILY MEDICINE

## 2020-06-03 PROCEDURE — 83036 HEMOGLOBIN GLYCOSYLATED A1C: CPT | Performed by: FAMILY MEDICINE

## 2020-06-03 PROCEDURE — 84439 ASSAY OF FREE THYROXINE: CPT | Performed by: FAMILY MEDICINE

## 2020-06-03 PROCEDURE — 80053 COMPREHEN METABOLIC PANEL: CPT | Performed by: FAMILY MEDICINE

## 2020-06-03 PROCEDURE — 80061 LIPID PANEL: CPT | Performed by: FAMILY MEDICINE

## 2020-07-08 ENCOUNTER — PREP FOR PROCEDURE (OUTPATIENT)
Dept: OBGYN CLINIC | Facility: OTHER | Age: 70
End: 2020-07-08

## 2020-07-08 ENCOUNTER — TELEPHONE (OUTPATIENT)
Dept: OBGYN CLINIC | Facility: HOSPITAL | Age: 70
End: 2020-07-08

## 2020-07-08 DIAGNOSIS — Z01.812 PRE-OPERATIVE LABORATORY EXAMINATION: Primary | ICD-10-CM

## 2020-07-08 NOTE — TELEPHONE ENCOUNTER
Patient is calling to find out what she needs to do for pre-op testing  Patient is having an ekg & she'll get clearance from her pcp  Patient is asking about blood work or covid-19 testing        [phone] leave vm

## 2020-07-08 NOTE — TELEPHONE ENCOUNTER
Called and left a detailed voicemail answering all of patient's questions about bloodwork, ekg, and COVID

## 2020-07-15 ENCOUNTER — ANESTHESIA EVENT (OUTPATIENT)
Dept: PERIOP | Facility: AMBULARY SURGERY CENTER | Age: 70
End: 2020-07-15
Payer: COMMERCIAL

## 2020-07-22 DIAGNOSIS — Z01.812 PRE-OPERATIVE LABORATORY EXAMINATION: ICD-10-CM

## 2020-07-22 PROCEDURE — U0003 INFECTIOUS AGENT DETECTION BY NUCLEIC ACID (DNA OR RNA); SEVERE ACUTE RESPIRATORY SYNDROME CORONAVIRUS 2 (SARS-COV-2) (CORONAVIRUS DISEASE [COVID-19]), AMPLIFIED PROBE TECHNIQUE, MAKING USE OF HIGH THROUGHPUT TECHNOLOGIES AS DESCRIBED BY CMS-2020-01-R: HCPCS | Performed by: OBSTETRICS & GYNECOLOGY

## 2020-07-23 LAB — SARS-COV-2 RNA SPEC QL NAA+PROBE: NOT DETECTED

## 2020-07-27 ENCOUNTER — TELEPHONE (OUTPATIENT)
Dept: OBGYN CLINIC | Facility: HOSPITAL | Age: 70
End: 2020-07-27

## 2020-07-27 NOTE — TELEPHONE ENCOUNTER
Dr Keith Manning    Patient has questions regarding her surgery on Wed 7/29 regarding preparation        #[phone]

## 2020-07-27 NOTE — TELEPHONE ENCOUNTER
Called no answer  If she calls back, specific questions can be addressed on VM    I will try again later

## 2020-07-28 RX ORDER — ALBUTEROL SULFATE 90 UG/1
2 AEROSOL, METERED RESPIRATORY (INHALATION) EVERY 6 HOURS PRN
COMMUNITY

## 2020-07-28 NOTE — PRE-PROCEDURE INSTRUCTIONS
Pre-Surgery Instructions:   Medication Instructions    albuterol (PROVENTIL HFA,VENTOLIN HFA) 90 mcg/act inhaler Instructed patient per Anesthesia Guidelines   allopurinol (ZYLOPRIM) 100 mg tablet Instructed patient per Anesthesia Guidelines   buPROPion (WELLBUTRIN SR) 150 mg 12 hr tablet Instructed patient per Anesthesia Guidelines   Calcium Carbonate-Vitamin D 600-400 MG-UNIT per chew tablet Instructed patient per Anesthesia Guidelines   cyanocobalamin (VITAMIN B-12) 100 mcg tablet Instructed patient per Anesthesia Guidelines   escitalopram (LEXAPRO) 20 mg tablet Instructed patient per Anesthesia Guidelines   Levothyroxine Sodium 100 MCG CAPS Instructed patient per Anesthesia Guidelines   Multiple Vitamins-Minerals (MULTIVITAMIN WITH MINERALS) tablet Instructed patient per Anesthesia Guidelines   NON FORMULARY Instructed patient per Anesthesia Guidelines   omeprazole (PriLOSEC) 40 MG capsule Instructed patient per Anesthesia Guidelines   torsemide (DEMADEX) 20 mg tablet Instructed patient per Anesthesia Guidelines      Pre op,medications and showering instructions reviewed-Patient has hibiclens- Instructed to bring immobilizer DOS

## 2020-07-29 ENCOUNTER — ANESTHESIA (OUTPATIENT)
Dept: PERIOP | Facility: AMBULARY SURGERY CENTER | Age: 70
End: 2020-07-29
Payer: COMMERCIAL

## 2020-07-29 ENCOUNTER — HOSPITAL ENCOUNTER (OUTPATIENT)
Facility: AMBULARY SURGERY CENTER | Age: 70
Setting detail: OUTPATIENT SURGERY
Discharge: HOME | End: 2020-07-29
Attending: ORTHOPAEDIC SURGERY | Admitting: ORTHOPAEDIC SURGERY
Payer: COMMERCIAL

## 2020-07-29 VITALS
HEIGHT: 64 IN | HEART RATE: 54 BPM | OXYGEN SATURATION: 97 % | WEIGHT: 201 LBS | RESPIRATION RATE: 17 BRPM | BODY MASS INDEX: 34.31 KG/M2 | TEMPERATURE: 97.4 F | SYSTOLIC BLOOD PRESSURE: 122 MMHG | DIASTOLIC BLOOD PRESSURE: 69 MMHG

## 2020-07-29 DIAGNOSIS — M75.101 TEAR OF RIGHT SUPRASPINATUS TENDON: Primary | ICD-10-CM

## 2020-07-29 PROCEDURE — 29826 SHO ARTHRS SRG DECOMPRESSION: CPT | Performed by: ORTHOPAEDIC SURGERY

## 2020-07-29 PROCEDURE — 29826 SHO ARTHRS SRG DECOMPRESSION: CPT | Performed by: PHYSICIAN ASSISTANT

## 2020-07-29 PROCEDURE — 29827 SHO ARTHRS SRG RT8TR CUF RPR: CPT | Performed by: ORTHOPAEDIC SURGERY

## 2020-07-29 PROCEDURE — 29828 SHO ARTHRS SRG BICP TENODSIS: CPT | Performed by: ORTHOPAEDIC SURGERY

## 2020-07-29 PROCEDURE — C1713 ANCHOR/SCREW BN/BN,TIS/BN: HCPCS | Performed by: ORTHOPAEDIC SURGERY

## 2020-07-29 PROCEDURE — 29827 SHO ARTHRS SRG RT8TR CUF RPR: CPT | Performed by: PHYSICIAN ASSISTANT

## 2020-07-29 PROCEDURE — 29828 SHO ARTHRS SRG BICP TENODSIS: CPT | Performed by: PHYSICIAN ASSISTANT

## 2020-07-29 PROCEDURE — C9290 INJ, BUPIVACAINE LIPOSOME: HCPCS | Performed by: STUDENT IN AN ORGANIZED HEALTH CARE EDUCATION/TRAINING PROGRAM

## 2020-07-29 PROCEDURE — 99024 POSTOP FOLLOW-UP VISIT: CPT | Performed by: ORTHOPAEDIC SURGERY

## 2020-07-29 DEVICE — ANCHOR HEALIX ADV BR 3 SUTURE W/ORTHOCORD 4.5MM: Type: IMPLANTABLE DEVICE | Status: FUNCTIONAL

## 2020-07-29 RX ORDER — PROPOFOL 10 MG/ML
INJECTION, EMULSION INTRAVENOUS AS NEEDED
Status: DISCONTINUED | OUTPATIENT
Start: 2020-07-29 | End: 2020-07-29 | Stop reason: SURG

## 2020-07-29 RX ORDER — ONDANSETRON 2 MG/ML
4 INJECTION INTRAMUSCULAR; INTRAVENOUS EVERY 6 HOURS PRN
Status: DISCONTINUED | OUTPATIENT
Start: 2020-07-29 | End: 2020-07-29 | Stop reason: HOSPADM

## 2020-07-29 RX ORDER — DEXAMETHASONE SODIUM PHOSPHATE 4 MG/ML
INJECTION, SOLUTION INTRA-ARTICULAR; INTRALESIONAL; INTRAMUSCULAR; INTRAVENOUS; SOFT TISSUE AS NEEDED
Status: DISCONTINUED | OUTPATIENT
Start: 2020-07-29 | End: 2020-07-29 | Stop reason: SURG

## 2020-07-29 RX ORDER — HYDROMORPHONE HCL/PF 1 MG/ML
0.5 SYRINGE (ML) INJECTION
Status: DISCONTINUED | OUTPATIENT
Start: 2020-07-29 | End: 2020-07-29 | Stop reason: HOSPADM

## 2020-07-29 RX ORDER — OXYCODONE HYDROCHLORIDE 5 MG/1
TABLET ORAL
Qty: 13 TABLET | Refills: 0 | Status: SHIPPED | OUTPATIENT
Start: 2020-07-29 | End: 2020-10-05 | Stop reason: ALTCHOICE

## 2020-07-29 RX ORDER — ONDANSETRON 2 MG/ML
4 INJECTION INTRAMUSCULAR; INTRAVENOUS ONCE AS NEEDED
Status: DISCONTINUED | OUTPATIENT
Start: 2020-07-29 | End: 2020-07-29 | Stop reason: HOSPADM

## 2020-07-29 RX ORDER — DIPHENHYDRAMINE HYDROCHLORIDE 50 MG/ML
INJECTION INTRAMUSCULAR; INTRAVENOUS AS NEEDED
Status: DISCONTINUED | OUTPATIENT
Start: 2020-07-29 | End: 2020-07-29 | Stop reason: SURG

## 2020-07-29 RX ORDER — MIDAZOLAM HYDROCHLORIDE 2 MG/2ML
INJECTION, SOLUTION INTRAMUSCULAR; INTRAVENOUS AS NEEDED
Status: DISCONTINUED | OUTPATIENT
Start: 2020-07-29 | End: 2020-07-29 | Stop reason: SURG

## 2020-07-29 RX ORDER — FENTANYL CITRATE/PF 50 MCG/ML
50 SYRINGE (ML) INJECTION
Status: DISCONTINUED | OUTPATIENT
Start: 2020-07-29 | End: 2020-07-29 | Stop reason: HOSPADM

## 2020-07-29 RX ORDER — SODIUM CHLORIDE, SODIUM LACTATE, POTASSIUM CHLORIDE, CALCIUM CHLORIDE 600; 310; 30; 20 MG/100ML; MG/100ML; MG/100ML; MG/100ML
INJECTION, SOLUTION INTRAVENOUS CONTINUOUS PRN
Status: DISCONTINUED | OUTPATIENT
Start: 2020-07-29 | End: 2020-07-29 | Stop reason: SURG

## 2020-07-29 RX ORDER — METOCLOPRAMIDE HYDROCHLORIDE 5 MG/ML
10 INJECTION INTRAMUSCULAR; INTRAVENOUS ONCE AS NEEDED
Status: DISCONTINUED | OUTPATIENT
Start: 2020-07-29 | End: 2020-07-29 | Stop reason: HOSPADM

## 2020-07-29 RX ORDER — CEFAZOLIN SODIUM 2 G/50ML
SOLUTION INTRAVENOUS AS NEEDED
Status: DISCONTINUED | OUTPATIENT
Start: 2020-07-29 | End: 2020-07-29 | Stop reason: SURG

## 2020-07-29 RX ORDER — ONDANSETRON 2 MG/ML
INJECTION INTRAMUSCULAR; INTRAVENOUS AS NEEDED
Status: DISCONTINUED | OUTPATIENT
Start: 2020-07-29 | End: 2020-07-29 | Stop reason: SURG

## 2020-07-29 RX ORDER — LIDOCAINE HYDROCHLORIDE 10 MG/ML
INJECTION, SOLUTION EPIDURAL; INFILTRATION; INTRACAUDAL; PERINEURAL AS NEEDED
Status: DISCONTINUED | OUTPATIENT
Start: 2020-07-29 | End: 2020-07-29 | Stop reason: SURG

## 2020-07-29 RX ORDER — HYDRALAZINE HYDROCHLORIDE 20 MG/ML
INJECTION INTRAMUSCULAR; INTRAVENOUS AS NEEDED
Status: DISCONTINUED | OUTPATIENT
Start: 2020-07-29 | End: 2020-07-29 | Stop reason: SURG

## 2020-07-29 RX ORDER — OXYCODONE HYDROCHLORIDE 5 MG/1
5 TABLET ORAL EVERY 4 HOURS PRN
Status: DISCONTINUED | OUTPATIENT
Start: 2020-07-29 | End: 2020-07-29 | Stop reason: HOSPADM

## 2020-07-29 RX ORDER — SCOLOPAMINE TRANSDERMAL SYSTEM 1 MG/1
1 PATCH, EXTENDED RELEASE TRANSDERMAL
Status: DISCONTINUED | OUTPATIENT
Start: 2020-07-29 | End: 2020-07-29 | Stop reason: HOSPADM

## 2020-07-29 RX ORDER — PROPOFOL 10 MG/ML
INJECTION, EMULSION INTRAVENOUS CONTINUOUS PRN
Status: DISCONTINUED | OUTPATIENT
Start: 2020-07-29 | End: 2020-07-29 | Stop reason: SURG

## 2020-07-29 RX ORDER — FENTANYL CITRATE 50 UG/ML
INJECTION, SOLUTION INTRAMUSCULAR; INTRAVENOUS AS NEEDED
Status: DISCONTINUED | OUTPATIENT
Start: 2020-07-29 | End: 2020-07-29 | Stop reason: SURG

## 2020-07-29 RX ORDER — ACETAMINOPHEN 325 MG/1
650 TABLET ORAL EVERY 6 HOURS PRN
Status: DISCONTINUED | OUTPATIENT
Start: 2020-07-29 | End: 2020-07-29 | Stop reason: HOSPADM

## 2020-07-29 RX ADMIN — HYDRALAZINE HYDROCHLORIDE 5 MG: 20 INJECTION INTRAMUSCULAR; INTRAVENOUS at 08:08

## 2020-07-29 RX ADMIN — SCOPOLAMINE 1 PATCH: 1 PATCH TRANSDERMAL at 08:08

## 2020-07-29 RX ADMIN — BUPIVACAINE 20 ML: 13.3 INJECTION, SUSPENSION, LIPOSOMAL INFILTRATION at 07:20

## 2020-07-29 RX ADMIN — DIPHENHYDRAMINE HYDROCHLORIDE 12.5 MG: 50 INJECTION, SOLUTION INTRAMUSCULAR; INTRAVENOUS at 07:45

## 2020-07-29 RX ADMIN — PROPOFOL 200 MG: 10 INJECTION, EMULSION INTRAVENOUS at 07:42

## 2020-07-29 RX ADMIN — FENTANYL CITRATE 100 MCG: 50 INJECTION, SOLUTION INTRAMUSCULAR; INTRAVENOUS at 08:10

## 2020-07-29 RX ADMIN — SODIUM CHLORIDE, SODIUM LACTATE, POTASSIUM CHLORIDE, AND CALCIUM CHLORIDE: .6; .31; .03; .02 INJECTION, SOLUTION INTRAVENOUS at 07:37

## 2020-07-29 RX ADMIN — DEXAMETHASONE SODIUM PHOSPHATE 8 MG: 4 INJECTION, SOLUTION INTRAMUSCULAR; INTRAVENOUS at 07:58

## 2020-07-29 RX ADMIN — MIDAZOLAM HYDROCHLORIDE 2 MG: 1 INJECTION, SOLUTION INTRAMUSCULAR; INTRAVENOUS at 07:15

## 2020-07-29 RX ADMIN — LIDOCAINE HYDROCHLORIDE 50 MG: 10 INJECTION, SOLUTION EPIDURAL; INFILTRATION; INTRACAUDAL; PERINEURAL at 07:42

## 2020-07-29 RX ADMIN — ONDANSETRON 4 MG: 2 INJECTION INTRAMUSCULAR; INTRAVENOUS at 07:58

## 2020-07-29 RX ADMIN — PROPOFOL 100 MCG/KG/MIN: 10 INJECTION, EMULSION INTRAVENOUS at 07:42

## 2020-07-29 RX ADMIN — CEFAZOLIN SODIUM 2000 MG: 2 SOLUTION INTRAVENOUS at 07:45

## 2020-07-29 RX ADMIN — FENTANYL CITRATE 50 MCG: 50 INJECTION INTRAMUSCULAR; INTRAVENOUS at 09:00

## 2020-07-29 NOTE — H&P
I identified and marked the patient in the pre-op holding area after confirming the surgical consent  No changes to medical health since the H&P was preformed  The patient's prescription history was queried in the [place] [address] to ensure compliance with applicable state laws  Assessment  Diagnoses and all orders for this visit:     Tear of right supraspinatus tendon           Discussion and Plan:     · A thorough discussion was performed with the patient regarding the risks and benefit of operative and nonoperative treatment of their rotator cuff tear  Risks discussed include but were not limited to infection, neurovascular injury, recurrent tear, nonhealing of the repair, need for further surgery, need for biceps tenodesis or tenotomy, stiffness, need for prolonged rehabilitation, as well as the risk of anesthesia  After this discussion all questions were answered and informed consent was obtained in the office for arthroscopic rotator cuff repair of the right shoulder  The patient will be scheduled for this procedure accordingly  · Instructed patient to continue to perform HEP until surgical procedure is performed to avoid adhesive capsulitis  · Follow up post operatively with Ashleigh Ward PA-C     Subjective:   Patient ID: Rafael Chin is a 81 y o  female        The patient presents with a chief complaint of right shoulder pain  The pain began several month(s) ago and is associated with an acute injury  She states that in September she was helping her  up stairs and he almost fell and she caught him awkardly  The patient describes the pain as aching, dull and sharp in intensity,  constant in timing, and localizes the pain to the  right subacromial joint, glenohumeral joint  The pain is worse with movement, overhead work, overuse and raising arm over head and relieved by rest, ice, avoiding the painful activities    The pain is not associated with numbness and tingling  The pain is not associated with constitutional symptoms  The patient is awoken at night by the pain  The patient has had prior treatment in the form of 2 cortisone injections and formal physical therapy/home exercise program without benefit                    The following portions of the patient's history were reviewed and updated as appropriate: allergies, current medications, past family history, past medical history, past social history, past surgical history and problem list      Review of Systems   Constitutional: Negative for chills, fever and unexpected weight change  HENT: Negative for hearing loss, nosebleeds and sore throat  Eyes: Negative for pain, redness and visual disturbance  Respiratory: Negative for cough, shortness of breath and wheezing  Cardiovascular: Negative for chest pain, palpitations and leg swelling  Gastrointestinal: Negative for abdominal distention, nausea and vomiting  Endocrine: Negative for polydipsia and polyuria  Genitourinary: Negative for dysuria and hematuria  Skin: Negative for rash and wound  Neurological: Negative for dizziness, numbness and headaches  Psychiatric/Behavioral: Negative for decreased concentration and suicidal ideas          Objective:  /76   Pulse (!) 49   Temp (!) 97 °F (36 1 °C) (Temporal)   Resp 16   Ht 5' 4" (1 626 m)   Wt 91 2 kg (201 lb)   SpO2 100%   BMI 34 50 kg/m²        Right Shoulder Exam      Tenderness   The patient is experiencing no tenderness      Range of Motion   External rotation: 60   Forward flexion: 150   Internal rotation 0 degrees: Lumbar      Muscle Strength   Abduction: 4/5   External rotation: 5/5      Tests   Drop arm: positive     Other   Erythema: absent  Sensation: normal  Pulse: present                 Physical Exam   Constitutional: She is oriented to person, place, and time  She appears well-developed and well-nourished  HENT:   Head: Normocephalic and atraumatic     Eyes: Pupils are equal, round, and reactive to light  Neck: Normal range of motion  Neck supple  Cardiovascular: Normal rate, regular rhythm and normal heart sounds  Pulmonary/Chest: Effort normal and breath sounds normal  No respiratory distress  Abdominal: Soft  She exhibits no distension  Neurological: She is alert and oriented to person, place, and time  Skin: Skin is warm and dry  Psychiatric: She has a normal mood and affect   Her behavior is normal  Judgment and thought content normal    Nursing note and vitals reviewed            I have personally reviewed pertinent films in PACS and my interpretation is as follows      MRI Right Shoulder:   Shows a full-thickness anterior leading edge supraspinatus tendon tear with no atrophy

## 2020-07-29 NOTE — DISCHARGE INSTRUCTIONS
You are being scheduled for a shoulder arthroscopy to treat your symptoms  Below are some instructions and information on what to expect before and after your surgery  Pre-Surgical Preparation for Arthroscopic Shoulder Surgery: You will be contacted the evening prior to your surgery to confirm the scheduled time of the procedure and when to arrive at the hospital    Do not eat or drink anything after midnight the night before your surgery  Since you are having out-patient surgery, make sure that you have someone who can drive you home later in the day  Also, prepare that person for a long day, as the process of safely preparing for and recovering from the procedure is more time consuming than the actual procedure! As you will be in a sling after surgery, please wear or bring a loose fitting button-down shirt so that you can easily place this over the sling when you leave the surgical suite  This avoids having to place the operative arm in a sleeve  Most patients find that this is the easiest outfit to wear for the first week or so after surgery so you may want to plan accordingly  Most patients find that lying down in bed after shoulder surgery accentuates their discomfort  This is likely related to the effect of gravity on the swelling in the shoulder  As a result, most patients sleep better in a recliner or in bed with pillows propped up behind their back for the first few days or weeks after surgery  It is a good idea to plan for this ahead of time so there will be less hassle getting things set up the night after surgery  What to Expect After Arthroscopic Shoulder Surgery: It is normal to have swelling and discomfort in the shoulder for several days or a week after surgery  It is also normal to have a small amount of drainage from the surgical wounds (especially the first few days after surgery), as we put fluid into the shoulder to visualize the structures during surgery  It is NOT normal to have foul smelling, purulent drainage and if this is noted, please contact the office immediately or proceed to the emergency room for evaluation as this may indicate an infection  Applying ice bags to the shoulder may help with pain that is not controlled by the regional block  Ice should be applied 20-30 minutes at a time, every hour or two  Make sure to put a thin towel or T-shirt next to your skin to avoid direct contact of the ice with the skin  Icing is most helpful in the first 48 hours, although many people find that continuing past this time frame lessens their postoperative pain  Please note that your post-operative dressing is not conductive to ice, so if you need to, it is okay to remove that dressing even the night after surgery and place band-aids over the wounds in order for the ice to take effect  Pain Control    Most patients will receive a nerve block, the local anesthetic may keep your whole arm numb for up to 4 days  You will be given a prescription for narcotic pain medication when you are discharged from the hospital   With the newer nerve block that is being utilized, patients are rarely requiring the use of this narcotic pain medication  If you find you do not tolerate that type of pain medicine well, call our office and we will try another one  In addition to the narcotic pain medication, it is safe to use an anti-inflammatory (unless the patient has a medical condition that would not allow safe use of this mediation)  This includes the Advil, Motrin, Ibuprofen and Alleve category of medications  Simply follow the over the counter dosing on the package and use as indicated as another adjunct  Importantly since these medications are all very similar, use only one of them  Tylenol is a separate medication that can be utilized as well and can be taken at the same time as the other medication or given in a "staggered" manner    Just make sure that you follow the dosing on the over the counter bottle instructions  Also make sure that the pain medication prescribed by Dr Lula Valdez team does not contain acetaminophen (this is found in Percocet and Vicodin)  Typically we do not prescribe those types of pain medications but if for some reason that has been prescribed DO NOT add more Tylenol (acetaminophen) as you could end up taking too much of that medication  As mentioned above, most patients find that lying down accentuates their discomfort  You might sleep better in a recliner, or propped up in bed  Dr Jose David Singleton encourages patients to safely ambulate around the house as much as possible in the first few days after the procedure as this can help with blood circulation in the legs  While the incidence of blood clots is very rare following shoulder surgery, early ambulation is a great way to help decrease the already low rate  24 hours after the surgery you may remove the bandage and cover incisions with Band-Aids if needed  At that time you may shower, the wounds will have a surgical glue that will protect them from shower water but do not submerge your incisions directly (bathing or swimming) until at least 2 weeks post-operatively  It is safe to let the arm hang at the side and take a shower and put on a shirt without the sling on  Just make sure that you do not use the operative are to reach out and grab anything as that may damage the repair  When you are done showering and getting dressed please return the operative arm to the sling  Unless noted otherwise in your discharge paperwork, Dr Jose David Singleton uses absorbable sutures so they do not need to be removed  Dr Janet Nesbitt physician assistant (PA) will see you in the office a few days after the procedure to review the intra-operative findings and to initiate physical therapy if appropriate    A post-operative appointment should have been scheduled for you already, but if for some reason this did not happen, please call the office to make one  Physical therapy is important after nearly all shoulder surgeries and a detailed rehabilitation plan based on the specific intra-operative findings and procedures will be provided to your therapist at the first post-operative office visit  Most patients have post-operative therapy appointments scheduled pre-operatively, but if you do not, that will be handled at the first post-operative office visit  Unless expressly directed otherwise it is safe to remove the sling even the first day after the surgery and let the arm hang by the side  This allows patients to shower and even put a shirt on (bad arm in the sleeve first)  It is also safe to flex and extend their wrist, hand and fingers as much as possible when the block wears off  These simple motions can serve to pump fluid out of the forearm and decrease swelling in the arm

## 2020-07-29 NOTE — ANESTHESIA PROCEDURE NOTES
Peripheral Block    Patient location during procedure: holding area  Start time: 7/29/2020 7:20 AM  Reason for block: at surgeon's request and post-op pain management  Staffing  Anesthesiologist: Maia Spann MD  Performed: anesthesiologist   Preanesthetic Checklist  Completed: patient identified, site marked, surgical consent, pre-op evaluation, timeout performed, IV checked, risks and benefits discussed and monitors and equipment checked  Peripheral Block  Patient position: supine  Prep: ChloraPrep  Patient monitoring: continuous pulse ox and frequent blood pressure checks  Block type: interscalene  Laterality: right  Injection technique: single-shot  Procedures: ultrasound guided, Ultrasound guidance required for the procedure to increase accuracy and safety of medication placement and decrease risk of complications    Ultrasound permanent image saved  Needle  Needle type: Stimuplex   Needle gauge: 22 G  Needle length: 10 cm  Needle localization: anatomical landmarks and ultrasound guidance  Test dose: negative  Assessment  Injection assessment: incremental injection, local visualized surrounding nerve on ultrasound, negative aspiration for heme and transient paresthesias  Paresthesia pain: immediately resolved  Heart rate change: no  Slow fractionated injection: yes  Post-procedure:  site cleaned  patient tolerated the procedure well with no immediate complications  Additional Notes  Single needle pass, needle visualized throughout

## 2020-07-29 NOTE — ANESTHESIA POSTPROCEDURE EVALUATION
Post-Op Assessment Note    CV Status:  Stable       Mental Status:  Sleepy   Hydration Status:  Stable   PONV Controlled:  Controlled   Airway Patency:  Patent   Post Op Vitals Reviewed: Yes      Staff: CRNA           BP      Temp     Pulse    Resp      SpO2

## 2020-07-29 NOTE — ANESTHESIA PREPROCEDURE EVALUATION
Review of Systems/Medical History  Patient summary reviewed  Chart reviewed  History of anesthetic complications PONV    Cardiovascular  Negative cardio ROS Exercise tolerance (METS): >4,     Pulmonary  Smoker ex-smoker  , Asthma , well controlled/ stable ,        GI/Hepatic    GERD , Bariatric surgery,        Negative  ROS        Endo/Other  History of thyroid disease , hypothyroidism,   Obesity    GYN  Negative gynecology ROS          Hematology  Negative hematology ROS      Musculoskeletal    Arthritis     Neurology  Negative neurology ROS      Psychology   Depression ,              Physical Exam    Airway    Mallampati score: II  TM Distance: >3 FB  Neck ROM: full     Dental   No notable dental hx     Cardiovascular  Comment: Negative ROS, Rate: normal,     Pulmonary  Pulmonary exam normal     Other Findings        Anesthesia Plan  ASA Score- 3     Anesthesia Type- general and regional with ASA Monitors  Additional Monitors:   Airway Plan: LMA  Plan Factors-  Patient did not smoke on day of surgery  Induction- intravenous  Postoperative Plan- Plan for postoperative opioid use  Informed Consent- Anesthetic plan and risks discussed with patient  I personally reviewed this patient with the CRNA  Discussed and agreed on the Anesthesia Plan with the CRNA  Tripp Batista

## 2020-07-29 NOTE — OP NOTE
OPERATIVE REPORT  PATIENT NAME: Luz Ribeiro    :  1950  MRN: [id number]  Pt Location: [facility] OR [facility]OM 06    SURGERY DATE: 2020     SURGEON: Ronal Zamora MD     ASSISTANT: John Llanos PA-C     NOTE: John Llanos PA-C was present throughout the entire procedure and performed essential assistance with patient prepping, draping, positioning, suture management, wound closure, sterile dressing application and sling application, all under my direct supervision  NOTE: No qualified resident physician was available for assistance    PREOPERATIVE DIAGNOSIS:  Right Shoulder Supraspinatus Tear    POSTOPERATIVE DIAGNOSIS: Same plus Long Head Biceps Tendon Subluxation and Glenohumeral Osteoarthritis    PROCEDURES: Surgical Arthroscopy Right Shoulder with Rotator Cuff Repair, Long Head Biceps Tenodesis, Debridement of Glenohumeral Osteoarthritis and Subacromial Decompression    ANESTHESIA STAFF: Nancy Posada MD     ANESTHESIA TYPE: General LMA with ultrasound guided interscalene block (Exparel)  The interscalene block was provided by the anesthesia staff per my request for postoperative pain control and to decrease the use of postoperative narcotic medication for pain control  COMPLICATIONS: None    FINDINGS: Supraspinatus Tear, Long Head Biceps Tendon Subluxation and Glenohumeral Osteoarthritis    SPECIMEN(S):  None    ESTIMATED BLOOD LOSS: Minimal    INDICATION:  Briefly, the patient is a 78 y o   female with right shoulder pain  MRI scan confirmed a full thickness supraspinatus tear  The patient elected for arthroscopic treatment  Informed consent was obtained after a thorough discussion of the risks and benefits of the procedure, as well as alternatives to the procedure  OPERATIVE TECHNIQUE:  On the day of surgery, I identified the patients right shoulder and marked it with my initials   The patient was taken to the operating room where anesthesia was induced and 2 grams of IV Cefazolin were given  The patient was examined in the supine position and was found to have full range of motion of the right shoulder with no instability   The patient was then positioned in the [address] [place] [address] chair position  All bony prominences were padded  The shoulder was prepped and draped in normal sterile fashion  After a time-out for safety, a standard posterior arthroscopic portal was made  Glenohumeral evaluation revealed moderate degenerative changes of the humeral head and glenoid with no loose bodies  Degenerative changes with much more advanced than suspected on preoperative imaging and there was a large area of full-thickness cartilage loss over the inferior glenoid and posterior humeral head  There was a full thickness supraspinatus tear consistent with the preoperative imaging and this full-thickness supraspinatus tendon tear did cause disruption of the bicipital pulley rendering the long-head biceps unstable  A debridement glenohumeral joint was performed using a shaver  After the intra-articular work was completed, the scope was then placed in the subacromial space through a posterolateral portal where a thorough bursectomy was performed  The full thickness supraspinatus tear was found to measure 1 0 cm from anterior to posterior and was able to be easily reduced to the tuberosity  The tuberosity was prepared in routine fashion and a single row repair with one 4 5 mm triple loaded Mitek Healix Advanced anchor using two horizontal mattress stitches and 1 simple stitch through the tendon was performed achieving anatomic reduction of the rotator cuff tendon to the tuberosity  The long head of biceps tendon was indicated for tenodesis and it was incorporated into the rotator cuff repair by using the anterior limb of the most anterior anchor through and around the long head of biceps in a loop whipstitch fashion; the long head of biceps was then released   When the medial row of the rotator cuff repair was tied down, this incorporated the long head biceps tenodesis into our repair  The CA ligament was frayed so it was released off the anterolateral edge of acromion and a gentle acromioplasty was performed  The area was then irrigated  Scope was withdrawn  Wounds were closed with 4-0 Monocryl and Histoacryl  Sterile dressings and a sling with an abduction pillow was placed  The patient was awoken without complication and returned to the recovery room in good condition  We will see the patient back in the office next week to initiate therapy following standard rotator cuff repair rehabilitation protocol  At the end of procedure, the counts were correct       PATIENT DISPOSITION:  Stable to PACU      SIGNATURE: Waylon Li MD  DATE: July 29, 2020  TIME: 8:31 AM

## 2020-07-30 ENCOUNTER — TELEPHONE (OUTPATIENT)
Dept: OBGYN CLINIC | Facility: HOSPITAL | Age: 70
End: 2020-07-30

## 2020-07-30 DIAGNOSIS — Z98.890 POST-OPERATIVE NAUSEA AND VOMITING: Primary | ICD-10-CM

## 2020-07-30 DIAGNOSIS — R11.2 POST-OPERATIVE NAUSEA AND VOMITING: Primary | ICD-10-CM

## 2020-07-30 RX ORDER — ONDANSETRON 4 MG/1
4 TABLET, FILM COATED ORAL EVERY 8 HOURS PRN
Qty: 15 TABLET | Refills: 0 | Status: SHIPPED | OUTPATIENT
Start: 2020-07-30

## 2020-07-30 NOTE — TELEPHONE ENCOUNTER
Patient's daughter Joe Burns calling to report that the patient has been vomiting 5 or 6 times since she got home from the hospital last night  Stated that she has been trying to sip on some diet gingerale and is having a hard time keeping anything down  Patient stated she is not feeling weak or dehydrated at this time  Please advise if we can call an antianemic in for the patient to the [facility] in [place]      Thank you

## 2020-07-30 NOTE — TELEPHONE ENCOUNTER
Spoke to patient's daughter and informed above  Advised taking the medication as prescribed and eating small meals and keeping hydrated  Advised that if she is still having vomiting after trying above methods she should call back  Verbalized understanding  Pratibha's call back number [phone]

## 2020-07-30 NOTE — TELEPHONE ENCOUNTER
Medication will be sent    She should avoid any narcotic (shouldn't be taking anyway as she shouldn't be having pain with the block)

## 2020-07-31 NOTE — TELEPHONE ENCOUNTER
Checked in with patient this morning  She stated the patient is feeling much better since starting zofran  Has started taking pain medications due to the block wearing off now  Advised tylenol 1000 mg OTC Q8h no more than 3000 mg in 24 hrs  Verbalized understanding  Unsure if she can take NSAIDs due to gastric surgery  Advised checking with her gastro doc before taking NSAID  Also advised ice 20  mins on 20 mins off  Verbalized understanding

## 2020-08-02 DIAGNOSIS — M75.101 TEAR OF RIGHT SUPRASPINATUS TENDON: ICD-10-CM

## 2020-08-03 ENCOUNTER — OFFICE VISIT (OUTPATIENT)
Dept: OBGYN CLINIC | Facility: HOSPITAL | Age: 70
End: 2020-08-03

## 2020-08-03 VITALS
SYSTOLIC BLOOD PRESSURE: 144 MMHG | BODY MASS INDEX: 37.59 KG/M2 | HEART RATE: 48 BPM | DIASTOLIC BLOOD PRESSURE: 82 MMHG | WEIGHT: 219 LBS

## 2020-08-03 DIAGNOSIS — Z47.89 AFTERCARE FOLLOWING SURGERY OF THE MUSCULOSKELETAL SYSTEM: Primary | ICD-10-CM

## 2020-08-03 PROCEDURE — 99024 POSTOP FOLLOW-UP VISIT: CPT | Performed by: PHYSICIAN ASSISTANT

## 2020-08-03 RX ORDER — HYDROCODONE BITARTRATE AND ACETAMINOPHEN 5; 325 MG/1; MG/1
1 TABLET ORAL EVERY 6 HOURS PRN
Qty: 12 TABLET | Refills: 0 | Status: SHIPPED | OUTPATIENT
Start: 2020-08-03 | End: 2020-10-05 | Stop reason: ALTCHOICE

## 2020-08-03 RX ORDER — OXYCODONE HYDROCHLORIDE 5 MG/1
TABLET ORAL
Qty: 13 TABLET | Refills: 0 | OUTPATIENT
Start: 2020-08-03

## 2020-08-03 NOTE — PROGRESS NOTES
Assessment:       1  Aftercare following surgery of the musculoskeletal system          Plan:        Patient is doing fair postoperatively  We discussed a strategy to address pain management  I will call in a prescription for Norco  Operative note, images, and rehab protocol for standard rotator cuff repair were discussed  All questions were addressed to the patient's satisfaction  Patient needs an appointment with PT  Follow-up will be in 2 months to assess patient's progress  Subjective:     Patient ID: Srikanth Hutchinson is a 78 y o  female  Chief Complaint:    HPI    Patient presents to the office for follow-up status post right shoulder arthroscopy on 2020  She is complaining of pain this the knee prescribed and over-the-counter pain medication  She is also trying ice  She has regained sensation following anesthetic block  Social History     Occupational History    Not on file   Tobacco Use    Smoking status: Former Smoker     Last attempt to quit:      Years since quittin 6    Smokeless tobacco: Never Used   Substance and Sexual Activity    Alcohol use: Yes     Frequency: 2-4 times a month     Drinks per session: 1 or 2     Binge frequency: Never     Comment: socially    Drug use: Never    Sexual activity: Not Currently     Partners: Male      Review of Systems   Constitutional: Negative  Respiratory: Negative  Musculoskeletal: Positive for myalgias  Skin: Positive for wound  Neurological: Positive for weakness  Negative for numbness  Psychiatric/Behavioral: Negative  Objective:     Ortho ExamPhysical Exam   Constitutional: She is oriented to person, place, and time  HENT:   Head: Atraumatic  Cardiovascular: Normal pulses  Pulmonary/Chest: Effort normal    Neurological: She is alert and oriented to person, place, and time  No sensory deficit  Skin: Skin is warm and dry  Capillary refill takes less than 2 seconds     Surgical incisions dry and clean    Psychiatric: Her behavior is normal  Mood normal

## 2020-08-03 NOTE — PATIENT INSTRUCTIONS
1  Sling as per protocol  2  PT per standard rotator cuff repair protocol  3   Follow-up in 2 months

## 2020-08-04 ENCOUNTER — EVALUATION (OUTPATIENT)
Dept: PHYSICAL THERAPY | Facility: MEDICAL CENTER | Age: 70
End: 2020-08-04
Payer: COMMERCIAL

## 2020-08-04 DIAGNOSIS — Z98.890 S/P RIGHT ROTATOR CUFF REPAIR: Primary | ICD-10-CM

## 2020-08-04 PROCEDURE — 97110 THERAPEUTIC EXERCISES: CPT | Performed by: PHYSICAL THERAPIST

## 2020-08-04 PROCEDURE — 97161 PT EVAL LOW COMPLEX 20 MIN: CPT | Performed by: PHYSICAL THERAPIST

## 2020-08-04 NOTE — PROGRESS NOTES
PT Evaluation     Today's date: 2020  Patient name: Gamaliel Titus  : 1950  MRN: [id number]  Referring provider: Daya Houston MD  Dx:   Encounter Diagnosis     ICD-10-CM    1  S/P right rotator cuff repair  Z98 890                   Assessment  Assessment details: Gamaliel Titus is a 78 y o  female who presents with S/P right rotator cuff repair  (primary encounter diagnosis)  Patient presents alert and oriented with the above impairments  Jered Quiroz will benefit from PT to addres deficits in order to maximize and return to prior level of function  No further referral appears necessary at this time based upon examination results  Prognosis is good given HEP compliance  Please contact me if you have any questions or recommendations  Reviewed protocol and precautions w/ patient  Impairments: abnormal or restricted ROM, abnormal movement, activity intolerance, impaired physical strength, lacks appropriate home exercise program and pain with function  Understanding of Dx/Px/POC: good   Prognosis: good    Goals  Short Term Goals:   1  Pain decreased 2 ratings in 6 weeks  2  ROM increased 10* in 6 weeks  3  Strength increased 1/2 grade in 6 weeks    Long Term Goals:   1  ADLS/IADLS in related activities improved to maximal level in 12 weeks  2  Work performance improved to maximal level in 16 weeks  3  Reaching is improved to maximal level in 12 weeks  4   [place] with HEP in 8 weeks      Plan  Patient would benefit from: skilled physical therapy  Planned modality interventions: cryotherapy  Planned therapy interventions: stretching, strengthening, patient education, neuromuscular re-education, manual therapy, home exercise program, functional ROM exercises, flexibility and therapeutic exercise  Frequency: 2x week  Duration in weeks: 16  Treatment plan discussed with: patient        Subjective Evaluation    History of Present Illness  Mechanism of injury: Pt underwent R RTC repair w/ biceps tenodesis on  following failed conservative treatment  Since surgery she c/o constant pain in shoulder and upper arm  She had post op Md visit yesterday and abduction pillow was removed  She was referred to PT  She does report compliance w/ wearing sling  She is requiring assistance for bathing and dressing  She does have family living w/ her to assist w/ household chores  She c/o sleep disturbance  She is employed throwing [employer] and duties require lifting up to 40-50#     Pain  At best pain ratin  At worst pain rating: 10    Patient Goals  Patient goals for therapy: decreased pain, increased strength, increased motion, independence with ADLs/IADLs and return to work          Objective     Active Range of Motion   Cervical/Thoracic Spine     Normal active range of motion    Right Elbow   Extension: -15 degrees     Right Wrist   Normal active range of motion    Passive Range of Motion   Left Shoulder   Flexion: 170 degrees   Abduction: 170 degrees   External rotation 45°: 70 degrees   Internal rotation 45°: 65 degrees     Right Shoulder   Flexion: 55 degrees   Abduction: 60 degrees   External rotation 45°: -15 degrees     Right Elbow   Flexion: 140 degrees   Extension: -10 degrees       Flowsheet Rows      Most Recent Value   PT/OT G-Codes   Current Score  36   Projected Score  67   FOTO information reviewed  Yes   Assessment Type  Evaluation   G code set  Other PT/OT Primary   Other PT Primary Current Status ()  CL   Other PT Primary Goal Status ()  CJ             Precautions: hx uterine CA,protocol      Manuals             PROM R shoulder per protocol nv                                                   Neuro Re-Ed                                                                                                        Ther Ex             Wrist AROM flex/ext x20             Cervical SB/Rot 5"x10 ea            pendulums nv            scap squeezes nv            digiflex nv Ther Activity                                       Gait Training                                       Modalities                                        Also issued yellow putty for home use

## 2020-08-06 ENCOUNTER — OFFICE VISIT (OUTPATIENT)
Dept: PHYSICAL THERAPY | Facility: MEDICAL CENTER | Age: 70
End: 2020-08-06
Payer: COMMERCIAL

## 2020-08-06 DIAGNOSIS — Z98.890 S/P RIGHT ROTATOR CUFF REPAIR: Primary | ICD-10-CM

## 2020-08-06 PROCEDURE — 97140 MANUAL THERAPY 1/> REGIONS: CPT | Performed by: PHYSICAL THERAPIST

## 2020-08-06 PROCEDURE — 97110 THERAPEUTIC EXERCISES: CPT | Performed by: PHYSICAL THERAPIST

## 2020-08-06 NOTE — PROGRESS NOTES
Daily Note     Today's date: 2020  Patient name: Miller Lees  : 1950  MRN: [id number]  Referring provider: Nabila Nascimento MD  Dx:   Encounter Diagnosis     ICD-10-CM    1  S/P right rotator cuff repair  Z98 890                   Subjective: Pt offers no new complaints today  Objective: See treatment diary below      Assessment: Tolerated treatment fair  Patient demonstrated fatigue post treatment, exhibited good technique with therapeutic exercises and would benefit from continued PT   Pt c/o pain w/ PROM; requires much cueing to maintain relaxation  Plan: Continue per plan of care        Precautions: hx uterine CA,protocol      Manuals            PROM R shoulder per protocol nv 10 min                                                  Neuro Re-Ed                                                                                                        Ther Ex            Wrist AROM flex/ext x20  x20 ea           Cervical SB/Rot 5"x10 ea 5"x10 ea           Pendulums m/l, a/p nv nv           scap squeezes nv 5"x10           digiflex nv Red 5"x20                                                  Ther Activity                                       Gait Training                                       Modalities             CP post  5 min

## 2020-08-10 ENCOUNTER — OFFICE VISIT (OUTPATIENT)
Dept: PHYSICAL THERAPY | Facility: MEDICAL CENTER | Age: 70
End: 2020-08-10
Payer: COMMERCIAL

## 2020-08-10 DIAGNOSIS — Z98.890 S/P RIGHT ROTATOR CUFF REPAIR: Primary | ICD-10-CM

## 2020-08-10 PROCEDURE — 97110 THERAPEUTIC EXERCISES: CPT | Performed by: PHYSICAL THERAPIST

## 2020-08-10 PROCEDURE — 97140 MANUAL THERAPY 1/> REGIONS: CPT | Performed by: PHYSICAL THERAPIST

## 2020-08-10 NOTE — PROGRESS NOTES
Daily Note     Today's date: 8/10/2020  Patient name: Harjeet Gaston  : 1950  MRN: [id number]  Referring provider: Sanket Martin MD  Dx:   Encounter Diagnosis     ICD-10-CM    1  S/P right rotator cuff repair  Z98 890                   Subjective:  Pt reports soreness following last visit  Objective: See treatment diary below      Assessment: Tolerated treatment well  Patient demonstrated fatigue post treatment, exhibited good technique with therapeutic exercises and would benefit from continued PT   Improved tolerance to PROM today  Plan: Continue per plan of care        Precautions: hx uterine CA,protocol      Manuals 8/4 8/6 8/10          PROM R shoulder per protocol nv 10 min 10 min                                                 Neuro Re-Ed                                                                                                        Ther Ex 8/4 8/6 8/10          Wrist AROM flex/ext x20  x20 ea x20 ea          Cervical SB/Rot 5"x10 ea 5"x10 ea 5"x10 ea          Pendulums m/l, a/p nv nv x10          scap squeezes nv 5"x10 5"x15          digiflex nv Red 5"x20 Red 5"x20                                                 Ther Activity                                       Gait Training                                       Modalities  8/6 8/10          CP post  5 min 10 min

## 2020-08-13 ENCOUNTER — OFFICE VISIT (OUTPATIENT)
Dept: PHYSICAL THERAPY | Facility: MEDICAL CENTER | Age: 70
End: 2020-08-13
Payer: COMMERCIAL

## 2020-08-13 DIAGNOSIS — Z98.890 S/P RIGHT ROTATOR CUFF REPAIR: Primary | ICD-10-CM

## 2020-08-13 PROCEDURE — 97140 MANUAL THERAPY 1/> REGIONS: CPT | Performed by: PHYSICAL THERAPIST

## 2020-08-13 PROCEDURE — 97110 THERAPEUTIC EXERCISES: CPT | Performed by: PHYSICAL THERAPIST

## 2020-08-13 NOTE — PROGRESS NOTES
Daily Note     Today's date: 2020  Patient name: Silvino Alston  : 1950  MRN: [id number]  Referring provider: Katie Noel MD  Dx:   Encounter Diagnosis     ICD-10-CM    1  S/P right rotator cuff repair  Z98 890                   Subjective:  Pt c/o some discomfort from sling  Reports compliance w/ wearing sling and avoiding usage of RUE  Objective: See treatment diary below      Assessment: Tolerated treatment well  Patient demonstrated fatigue post treatment, exhibited good technique with therapeutic exercises and would benefit from continued PT   Progressing well w/ PROM per protocol  Adjusted sling w/ patient reports of improved comfort  Plan: Continue per plan of care        Precautions: hx uterine CA,protocol      Manuals 8/4 8/6 8/10 8/13         PROM R shoulder per protocol nv 10 min 10 min 10 min                                                Neuro Re-Ed                                                                                                        Ther Ex 8/4 8/6 8/10 8/13         Wrist AROM flex/ext x20  x20 ea x20 ea x20 ea         Cervical SB/Rot 5"x10 ea 5"x10 ea 5"x10 ea 5"x10         Pendulums m/l, a/p nv nv x10 x10         scap squeezes nv 5"x10 5"x15 5"x20         digiflex nv Red 5"x20 Red 5"x20 Red 5"x20                                                Ther Activity                                       Gait Training                                       Modalities  8/6 8/10 8/13         CP post  5 min 10 min 10 min

## 2020-08-18 ENCOUNTER — OFFICE VISIT (OUTPATIENT)
Dept: PHYSICAL THERAPY | Facility: MEDICAL CENTER | Age: 70
End: 2020-08-18
Payer: COMMERCIAL

## 2020-08-18 DIAGNOSIS — Z98.890 S/P RIGHT ROTATOR CUFF REPAIR: Primary | ICD-10-CM

## 2020-08-18 PROCEDURE — 97140 MANUAL THERAPY 1/> REGIONS: CPT | Performed by: PHYSICAL THERAPIST

## 2020-08-18 PROCEDURE — 97110 THERAPEUTIC EXERCISES: CPT | Performed by: PHYSICAL THERAPIST

## 2020-08-18 NOTE — PROGRESS NOTES
Daily Note     Today's date: 2020  Patient name: Bart Barnett  : 1950  MRN: [id number]  Referring provider: Zach Awad MD  Dx:   Encounter Diagnosis     ICD-10-CM    1  S/P right rotator cuff repair  Z98 890                   Subjective:  Pt reports more tightness than pain today  Objective: See treatment diary below      Assessment: Tolerated treatment well  Patient demonstrated fatigue post treatment, exhibited good technique with therapeutic exercises and would benefit from continued PT   PROM within protocol limits  Reports feeling "good" post manuals today  Plan: Continue per plan of care        Precautions: hx uterine CA,protocol      Manuals 8/4 8/6 8/10 8/13 8/18        PROM R shoulder per protocol nv 10 min 10 min 10 min 15 min                                               Neuro Re-Ed                                                                                                        Ther Ex 8/4 8/6 8/10 8/13 8/18        Wrist AROM flex/ext x20  x20 ea x20 ea x20 ea x20        Cervical SB/Rot 5"x10 ea 5"x10 ea 5"x10 ea 5"x10 5"x10        Pendulums m/l, a/p nv nv x10 x10 x10        scap squeezes nv 5"x10 5"x15 5"x20 5"x20        digiflex nv Red 5"x20 Red 5"x20 Red 5"x20 Red 5"x20                                               Ther Activity                                       Gait Training                                       Modalities  8/6 8/10 8/13 8/18        CP post  5 min 10 min 10 min 10 min

## 2020-08-20 ENCOUNTER — OFFICE VISIT (OUTPATIENT)
Dept: PHYSICAL THERAPY | Facility: MEDICAL CENTER | Age: 70
End: 2020-08-20
Payer: COMMERCIAL

## 2020-08-20 DIAGNOSIS — Z98.890 S/P RIGHT ROTATOR CUFF REPAIR: Primary | ICD-10-CM

## 2020-08-20 PROCEDURE — 97110 THERAPEUTIC EXERCISES: CPT | Performed by: PHYSICAL THERAPIST

## 2020-08-20 PROCEDURE — 97140 MANUAL THERAPY 1/> REGIONS: CPT | Performed by: PHYSICAL THERAPIST

## 2020-08-20 NOTE — PROGRESS NOTES
Daily Note     Today's date: 2020  Patient name: Micah Piña  : 1950  MRN: [id number]  Referring provider: Remigio Torres MD  Dx:   Encounter Diagnosis     ICD-10-CM    1  S/P right rotator cuff repair  Z98 890                   Subjective:  Pt offers no new complaints today  Objective: See treatment diary below      Assessment: Tolerated treatment well  Patient demonstrated fatigue post treatment, exhibited good technique with therapeutic exercises and would benefit from continued PT   PROM continues to progress very well  Plan: Continue per plan of care        Precautions: hx uterine CA,protocol      Manuals 8/4 8/6 8/10 8/13 8/18 8/20       PROM R shoulder per protocol nv 10 min 10 min 10 min 15 min 15 min                                              Neuro Re-Ed                                                                                                        Ther Ex 8/4 8/6 8/10 8/13 8/18 8/20       Wrist AROM flex/ext x20  x20 ea x20 ea x20 ea x20 x20       Cervical SB/Rot 5"x10 ea 5"x10 ea 5"x10 ea 5"x10 5"x10 5"x10       Pendulums m/l, a/p nv nv x10 x10 x10 x10       scap squeezes nv 5"x10 5"x15 5"x20 5"x20 5"x20       digiflex nv Red 5"x20 Red 5"x20 Red 5"x20 Red 5"x20 Red 5"x20                                              Ther Activity                                       Gait Training                                       Modalities  8/6 8/10 8/13 8/18 8/20       CP post  5 min 10 min 10 min 10 min 10 min

## 2020-08-25 ENCOUNTER — OFFICE VISIT (OUTPATIENT)
Dept: PHYSICAL THERAPY | Facility: MEDICAL CENTER | Age: 70
End: 2020-08-25
Payer: COMMERCIAL

## 2020-08-25 DIAGNOSIS — Z98.890 S/P RIGHT ROTATOR CUFF REPAIR: Primary | ICD-10-CM

## 2020-08-25 PROCEDURE — 97140 MANUAL THERAPY 1/> REGIONS: CPT | Performed by: PHYSICAL THERAPIST

## 2020-08-25 PROCEDURE — 97110 THERAPEUTIC EXERCISES: CPT | Performed by: PHYSICAL THERAPIST

## 2020-08-25 NOTE — PROGRESS NOTES
Daily Note     Today's date: 2020  Patient name: Jp Zee  : 1950  MRN: [id number]  Referring provider: Clem Barron MD  Dx:   Encounter Diagnosis     ICD-10-CM    1  S/P right rotator cuff repair  Z98 890                   Subjective:  Pt reports slight increase in stiffness today  Objective: See treatment diary below      Assessment: Tolerated treatment well  Patient demonstrated fatigue post treatment, exhibited good technique with therapeutic exercises and would benefit from continued PT   Guarded initially w/ PROM; she was able to maintain relaxation w/ cueing  Progressing well w/ protocol  Plan: Continue per plan of care        Precautions: hx uterine CA,protocol      Manuals 8/4 8/6 8/10 8/13 8/18 8/20 8/25      PROM R shoulder per protocol nv 10 min 10 min 10 min 15 min 15 min 15 min                                             Neuro Re-Ed                                                                                                        Ther Ex 8/4 8/6 8/10 8/13 8/18 8/20 8/25      Wrist AROM flex/ext x20  x20 ea x20 ea x20 ea x20 x20 x20      Cervical SB/Rot 5"x10 ea 5"x10 ea 5"x10 ea 5"x10 5"x10 5"x10 5"x10      Pendulums m/l, a/p nv nv x10 x10 x10 x10 x10      scap squeezes nv 5"x10 5"x15 5"x20 5"x20 5"x20 5"x20      digiflex nv Red 5"x20 Red 5"x20 Red 5"x20 Red 5"x20 Red 5"x20 Red 5"x20                                             Ther Activity                                       Gait Training                                       Modalities  8/6 8/10 8/13 8/18 8/20 8/25      CP post  5 min 10 min 10 min 10 min 10 min 10 min

## 2020-08-27 ENCOUNTER — OFFICE VISIT (OUTPATIENT)
Dept: PHYSICAL THERAPY | Facility: MEDICAL CENTER | Age: 70
End: 2020-08-27
Payer: COMMERCIAL

## 2020-08-27 DIAGNOSIS — Z98.890 S/P RIGHT ROTATOR CUFF REPAIR: Primary | ICD-10-CM

## 2020-08-27 PROCEDURE — 97140 MANUAL THERAPY 1/> REGIONS: CPT | Performed by: PHYSICAL THERAPIST

## 2020-08-27 PROCEDURE — 97110 THERAPEUTIC EXERCISES: CPT | Performed by: PHYSICAL THERAPIST

## 2020-08-27 NOTE — PROGRESS NOTES
Daily Note     Today's date: 2020  Patient name: Asher Carvajal  : 1950  MRN: [id number]  Referring provider: Rekha Peter MD  Dx:   Encounter Diagnosis     ICD-10-CM    1  S/P right rotator cuff repair  Z98 890                   Subjective:  Pt c/o stiffness prior to treatment today  Objective: See treatment diary below      Assessment: Tolerated treatment well  Patient demonstrated fatigue post treatment, exhibited good technique with therapeutic exercises and would benefit from continued PT   Progressed w/ table slides flexion and scaption w/ good tolerance today  Slight discomfort w/ passive flexion  Plan: Continue per plan of care        Precautions: hx uterine CA,protocol      Manuals 8/4 8/6 8/10 8/13 8/18 8/20 8/25 8/27     PROM R shoulder per protocol nv 10 min 10 min 10 min 15 min 15 min 15 min 15 min                                            Neuro Re-Ed                                                                                                        Ther Ex 8/4 8/6 8/10 8/13 8/18 8/20 8/25 8/27     Wrist AROM flex/ext x20  x20 ea x20 ea x20 ea x20 x20 x20 np     Cervical SB/Rot 5"x10 ea 5"x10 ea 5"x10 ea 5"x10 5"x10 5"x10 5"x10 5"x10     Pendulums m/l, a/p nv nv x10 x10 x10 x10 x10 np     scap squeezes nv 5"x10 5"x15 5"x20 5"x20 5"x20 5"x20 5"x20     digiflex nv Red 5"x20 Red 5"x20 Red 5"x20 Red 5"x20 Red 5"x20 Red 5"x20 Red 5"x10     Table slides flex and scap        10" x10 ea                               Ther Activity                                       Gait Training                                       Modalities  8/6 8/10 8/13 8/18 8/20 8/25 8/27     CP post  5 min 10 min 10 min 10 min 10 min 10 min 10 min

## 2020-09-01 ENCOUNTER — OFFICE VISIT (OUTPATIENT)
Dept: PHYSICAL THERAPY | Facility: MEDICAL CENTER | Age: 70
End: 2020-09-01
Payer: COMMERCIAL

## 2020-09-01 DIAGNOSIS — Z98.890 S/P RIGHT ROTATOR CUFF REPAIR: Primary | ICD-10-CM

## 2020-09-01 PROCEDURE — 97112 NEUROMUSCULAR REEDUCATION: CPT

## 2020-09-01 PROCEDURE — 97140 MANUAL THERAPY 1/> REGIONS: CPT

## 2020-09-01 PROCEDURE — 97110 THERAPEUTIC EXERCISES: CPT

## 2020-09-01 NOTE — PROGRESS NOTES
Daily Note     Today's date: 2020  Patient name: Eunice De La Cruz  : 1950  MRN: [id number]  Referring provider: Dena Amezcua MD  Dx:   Encounter Diagnosis     ICD-10-CM    1  S/P right rotator cuff repair  Z98 890                   Subjective:  Pt states she has no new complaints from last session as she has had no changes  Objective: See treatment diary below      Assessment: Tolerated treatment well with no increase in pain and min to moderate fatigue  Pt required VC with pendulums to decrease her pain with them   Pt continues to be limited with ROM and continues to work toward her goals  Patient would benefit from continued PT         Plan: Continue per plan of care        Precautions: hx uterine CA,protocol      Manuals 8/4 8/6 8/10 8/13 8/18 8/20 8/25 8/27 9/1    PROM R shoulder per protocol nv 10 min 10 min 10 min 15 min 15 min 15 min 15 min 15 min                                            Neuro Re-Ed                                                                                                        Ther Ex 8/4 8/6 8/10 8/13 8/18 8/20 8/25 8/27 9/1    Wrist AROM flex/ext x20  x20 ea x20 ea x20 ea x20 x20 x20 np np    Cervical SB/Rot 5"x10 ea 5"x10 ea 5"x10 ea 5"x10 5"x10 5"x10 5"x10 5"x10 5" x 10     Pendulums m/l, a/p nv nv x10 x10 x10 x10 x10 np 10x     scap squeezes nv 5"x10 5"x15 5"x20 5"x20 5"x20 5"x20 5"x20 5" x 20     digiflex nv Red 5"x20 Red 5"x20 Red 5"x20 Red 5"x20 Red 5"x20 Red 5"x20 Red 5"x10 Red 5" x 10     Table slides flex and scap        10" x10 ea 10 x10"                               Ther Activity                                       Gait Training                                       Modalities  8/6 8/10 8/13 8/18 8/20 8/25 8/27 9/1     CP post  5 min 10 min 10 min 10 min 10 min 10 min 10 min 10 min

## 2020-09-03 ENCOUNTER — OFFICE VISIT (OUTPATIENT)
Dept: PHYSICAL THERAPY | Facility: MEDICAL CENTER | Age: 70
End: 2020-09-03
Payer: COMMERCIAL

## 2020-09-03 DIAGNOSIS — Z98.890 S/P RIGHT ROTATOR CUFF REPAIR: Primary | ICD-10-CM

## 2020-09-03 PROCEDURE — 97110 THERAPEUTIC EXERCISES: CPT | Performed by: PHYSICAL THERAPIST

## 2020-09-03 PROCEDURE — 97140 MANUAL THERAPY 1/> REGIONS: CPT | Performed by: PHYSICAL THERAPIST

## 2020-09-03 NOTE — PROGRESS NOTES
Daily Note     Today's date: 9/3/2020  Patient name: Mayo Smalls  : 1950  MRN: [id number]  Referring provider: Anahi Hawley MD  Dx:   Encounter Diagnosis     ICD-10-CM    1  S/P right rotator cuff repair  Z98 890                   Subjective:  Pt offers no new complaints today  Objective: See treatment diary below      Assessment: Tolerated treatment well Patient would benefit from continued PT   Added pulleys today w/ good tolerance  Tightness persisting at end ranges in all planes; She does require cues to maintain relaxation  Plan: Continue per plan of care        Precautions: hx uterine CA,protocol      Manuals 8/4 8/6 8/10 8/13 8/18 8/20 8/25 8/27 9/1 9/3   PROM R shoulder per protocol nv 10 min 10 min 10 min 15 min 15 min 15 min 15 min 15 min  15 min                                          Neuro Re-Ed                                                                                                        Ther Ex 8/4 8/6 8/10 8/13 8/18 8/20 8/25 8/27 9/1 9/3   Wrist AROM flex/ext x20  x20 ea x20 ea x20 ea x20 x20 x20 np np np   Cervical SB/Rot 5"x10 ea 5"x10 ea 5"x10 ea 5"x10 5"x10 5"x10 5"x10 5"x10 5" x 10  np   Pendulums m/l, a/p nv nv x10 x10 x10 x10 x10 np 10x  np   scap squeezes nv 5"x10 5"x15 5"x20 5"x20 5"x20 5"x20 5"x20 5" x 20  5"x20   digiflex nv Red 5"x20 Red 5"x20 Red 5"x20 Red 5"x20 Red 5"x20 Red 5"x20 Red 5"x10 Red 5" x 10  np   Table slides flex and scap        10" x10 ea 10 x10"  10" x10   pulleys          5 min                Ther Activity                                       Gait Training                                       Modalities  8/6 8/10 8/13 8/18 8/20 8/25 8/27 9/1  9/3   CP post  5 min 10 min 10 min 10 min 10 min 10 min 10 min 10 min 10 MIN

## 2020-09-08 ENCOUNTER — OFFICE VISIT (OUTPATIENT)
Dept: PHYSICAL THERAPY | Facility: MEDICAL CENTER | Age: 70
End: 2020-09-08
Payer: COMMERCIAL

## 2020-09-08 DIAGNOSIS — Z98.890 S/P RIGHT ROTATOR CUFF REPAIR: Primary | ICD-10-CM

## 2020-09-08 PROCEDURE — 97140 MANUAL THERAPY 1/> REGIONS: CPT | Performed by: PHYSICAL THERAPIST

## 2020-09-08 PROCEDURE — 97110 THERAPEUTIC EXERCISES: CPT | Performed by: PHYSICAL THERAPIST

## 2020-09-08 NOTE — PROGRESS NOTES
Daily Note     Today's date: 2020  Patient name: Gene Arellano  : 1950  MRN: [id number]  Referring provider: Hazel Lang MD  Dx:   Encounter Diagnosis     ICD-10-CM    1  S/P right rotator cuff repair  Z98 890                   Subjective:  Pt feels that shoulder is starting to loosen  Objective: See treatment diary below      Assessment: Tolerated treatment well Patient would benefit from continued PT   PROM improved in all planes  Added wall slides w/ good tolerance  Plan: Continue per plan of care        Precautions: hx uterine CA,protocol      Manuals             PROM R shoulder per protocol 10 min                                                   Neuro Re-Ed                                                                                                        Ther Ex             pulleys 5 min            Wall slides 10" x10            scap squeezes 5"x20            Table slides flex and scap 10" x10 ea            Table slides ER 10" x10                                                   Ther Activity                                       Gait Training                                       Modalities             CP post 10 min

## 2020-09-10 ENCOUNTER — EVALUATION (OUTPATIENT)
Dept: PHYSICAL THERAPY | Facility: MEDICAL CENTER | Age: 70
End: 2020-09-10
Payer: COMMERCIAL

## 2020-09-10 ENCOUNTER — TELEPHONE (OUTPATIENT)
Dept: OBGYN CLINIC | Facility: CLINIC | Age: 70
End: 2020-09-10

## 2020-09-10 DIAGNOSIS — Z98.890 S/P RIGHT ROTATOR CUFF REPAIR: Primary | ICD-10-CM

## 2020-09-10 PROCEDURE — 97140 MANUAL THERAPY 1/> REGIONS: CPT | Performed by: PHYSICAL THERAPIST

## 2020-09-10 PROCEDURE — 97110 THERAPEUTIC EXERCISES: CPT | Performed by: PHYSICAL THERAPIST

## 2020-09-10 NOTE — PROGRESS NOTES
PT Re-Evaluation     Today's date: 9/10/2020  Patient name: Jolie Finney  : 1950  MRN: [id number]  Referring provider: Charlie Zayas MD  Dx:   Encounter Diagnosis     ICD-10-CM    1  S/P right rotator cuff repair  Z98 890                   Assessment  Assessment details: Jolie Finney has attended 12  visits since initiating PT and has demonstrated overall improvement  Mobility and strength has improved with decreased reports of pain  Jolie Finney has demonstrated an improvement in function as well  Currently, she has made steady progress towards her goals, but continue to remain limited with RUE usage and movement; largely in part to protocol limitations to date  Sling was discontinued today  She will gradually move toward independence w/ ADLS  Unable to RTW due to nature of job duties consisting of heavy lifting and excessive usage of RUE  Reviewed protocol again w/ patient and reminded to avoid lifting anything heavier than 3# w/ RUE  At this time, continued physical therapy is recommended in order to address their remaining impairments in effort to further improve function  Impairments: abnormal or restricted ROM, abnormal movement, activity intolerance, impaired physical strength, lacks appropriate home exercise program and pain with function  Understanding of Dx/Px/POC: good   Prognosis: good    Goals  Short Term Goals:   1  Pain decreased 2 ratings in 6 weeks MET  2   ROM increased 10* in 6 weeks MET  3  Strength increased 1/2 grade in 6 weeks NOT MET    Long Term Goals:   1  ADLS/IADLS in related activities improved to maximal level in 12 weeks PARTIALLY MET  2  Work performance improved to maximal level in 16 weeks NOT MET  3  Reaching is improved to maximal level in 12 weeks  NOT MET  4  [place] with HEP in 8 weeks   PARTIALLY MET    Plan  Patient would benefit from: skilled physical therapy  Planned modality interventions: cryotherapy  Planned therapy interventions: stretching, strengthening, patient education, neuromuscular re-education, manual therapy, home exercise program, functional ROM exercises, flexibility and therapeutic exercise  Frequency: 2x week  Duration in weeks: 12  Treatment plan discussed with: patient        Subjective Evaluation    History of Present Illness  Mechanism of injury: Pt reports overall improvement  Pain is now intermittent in nature  Typically pain occurs w/ movement of RUE  She has been compliant w/ protocol restrictions  She has resumed some light household chores  She is bathing and dressing independently at most times  No longer c/o sleep disturbance  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#     Pain  At best pain ratin  At worst pain ratin    Patient Goals  Patient goals for therapy: decreased pain, increased strength, increased motion, independence with ADLs/IADLs and return to work          Objective     Active Range of Motion   Cervical/Thoracic Spine     Normal active range of motion    Right Shoulder   Flexion: 70 degrees   Abduction: 55 degrees     Right Elbow   Normal active range of motion    Right Wrist   Normal active range of motion    Passive Range of Motion     Right Shoulder   Flexion: 152 degrees   Abduction: 155 degrees   External rotation 45°: 50 degrees   Internal rotation 45°: 50 degrees     Right Elbow   Normal passive range of motion      Flowsheet Rows      Most Recent Value   PT/OT G-Codes   Current Score  43   Projected Score  67   FOTO information reviewed  Yes   Assessment Type  Re-evaluation   G code set  Other PT/OT Primary   Other PT Primary Current Status ()  CK   Other PT Primary Goal Status ()  CJ             Precautions: hx uterine CA,protocol      Manuals 9/10            PROM R shoulder per protocol 10 MIN                                                   Neuro Re-Ed Ther Ex 9/10            pulleys 5 min            Wall slides 10" x10            Table slides flex, scap, ER 10" x10 ea            Supine wand flex and scap 10" x10 ea                                                                Ther Activity                                       Gait Training                                       Modalities 9/10            CP post 10 min

## 2020-09-10 NOTE — TELEPHONE ENCOUNTER
We have never said she would be out the remainder of the year  I do not see any paperwork stating that  I have provided a note stating she out until follow up 10  5 2020

## 2020-09-10 NOTE — TELEPHONE ENCOUNTER
Patient is calling because her employer misplaced her paperwork and would like a note faxed to them stating that she will be out of work until the end of the year with an approximate date  Can we please fax to [employer] at [phone]

## 2020-09-14 ENCOUNTER — OFFICE VISIT (OUTPATIENT)
Dept: PHYSICAL THERAPY | Facility: MEDICAL CENTER | Age: 70
End: 2020-09-14
Payer: COMMERCIAL

## 2020-09-14 DIAGNOSIS — Z98.890 S/P RIGHT ROTATOR CUFF REPAIR: Primary | ICD-10-CM

## 2020-09-14 PROCEDURE — 97140 MANUAL THERAPY 1/> REGIONS: CPT | Performed by: PHYSICAL THERAPIST

## 2020-09-14 PROCEDURE — 97110 THERAPEUTIC EXERCISES: CPT | Performed by: PHYSICAL THERAPIST

## 2020-09-14 NOTE — PROGRESS NOTES
Daily Note     Today's date: 2020  Patient name: Mary Workman  : 1950  MRN: [id number]  Referring provider: Guillermo Wong MD  Dx:   Encounter Diagnosis     ICD-10-CM    1  S/P right rotator cuff repair  Z98 890                   Subjective: Pt offers no complaints prior to treatment today  She was able to do some yard work w/ help of grand daughter  Objective: See treatment diary below      Assessment: Tolerated treatment well  Patient demonstrated fatigue post treatment, exhibited good technique with therapeutic exercises and would benefit from continued PT   Added wand ER today w/ good tolerance  Improved ROM in all planes  Plan: Continue per plan of care        Precautions: hx uterine CA,protocol      Manuals 9/10 9/14           PROM R shoulder per protocol 10 MIN 10 min                                                  Neuro Re-Ed                                                                                                        Ther Ex 9/10 9/14           pulleys 5 min 5 min           Wall slides 10" x10 10" x10           Table slides flex, scap, ER 10" x10 ea 10" x10           Supine wand flex and scap 10" x10 ea 10" x10           Supine wand ER  10" x10                                                  Ther Activity                                       Gait Training                                       Modalities 9/10 9/14           CP post 10 min 10 min

## 2020-09-15 ENCOUNTER — APPOINTMENT (OUTPATIENT)
Dept: PHYSICAL THERAPY | Facility: MEDICAL CENTER | Age: 70
End: 2020-09-15
Payer: COMMERCIAL

## 2020-09-17 ENCOUNTER — OFFICE VISIT (OUTPATIENT)
Dept: PHYSICAL THERAPY | Facility: MEDICAL CENTER | Age: 70
End: 2020-09-17
Payer: COMMERCIAL

## 2020-09-17 DIAGNOSIS — Z98.890 S/P RIGHT ROTATOR CUFF REPAIR: Primary | ICD-10-CM

## 2020-09-17 PROCEDURE — 97140 MANUAL THERAPY 1/> REGIONS: CPT | Performed by: PHYSICAL THERAPIST

## 2020-09-17 PROCEDURE — 97110 THERAPEUTIC EXERCISES: CPT | Performed by: PHYSICAL THERAPIST

## 2020-09-17 PROCEDURE — 97112 NEUROMUSCULAR REEDUCATION: CPT | Performed by: PHYSICAL THERAPIST

## 2020-09-17 NOTE — PROGRESS NOTES
Daily Note     Today's date: 2020  Patient name: Dayan Win  : 1950  MRN: [id number]  Referring provider: Mick Barakat MD  Dx:   Encounter Diagnosis     ICD-10-CM    1  S/P right rotator cuff repair  Z98 890                   Subjective: Pt offers no new complaints today  Continues to use arm more around the house  Objective: See treatment diary below      Assessment: Tolerated treatment well  Patient demonstrated fatigue post treatment, exhibited good technique with therapeutic exercises and would benefit from continued PT   Added isometrics today w/ good tolerance  Plan: Continue per plan of care        Precautions: hx uterine CA,protocol      Manuals 9/10 9/14 9/17          PROM R shoulder per protocol 10 MIN 10 min 10 min                                                 Neuro Re-Ed             Shoulder isometrics 4 way   5"x10 ea                                                                                        Ther Ex 9/10 9/14 9/17          pulleys 5 min 5 min 5 min          Wall slides 10" x10 10" x10 10" x10          Table slides flex, scap, ER 10" x10 ea 10" x10 10" x10          Supine wand flex and scap 10" x10 ea 10" x10 10" x10          Supine wand ER  10" x10 10" x10                                                 Ther Activity                                       Gait Training                                       Modalities 9/10 9/14 9/17          CP post 10 min 10 min 10 min

## 2020-09-22 ENCOUNTER — OFFICE VISIT (OUTPATIENT)
Dept: PHYSICAL THERAPY | Facility: MEDICAL CENTER | Age: 70
End: 2020-09-22
Payer: COMMERCIAL

## 2020-09-22 DIAGNOSIS — Z98.890 S/P RIGHT ROTATOR CUFF REPAIR: Primary | ICD-10-CM

## 2020-09-22 PROCEDURE — 97140 MANUAL THERAPY 1/> REGIONS: CPT | Performed by: PHYSICAL THERAPIST

## 2020-09-22 PROCEDURE — 97112 NEUROMUSCULAR REEDUCATION: CPT | Performed by: PHYSICAL THERAPIST

## 2020-09-22 PROCEDURE — 97110 THERAPEUTIC EXERCISES: CPT | Performed by: PHYSICAL THERAPIST

## 2020-09-22 NOTE — PROGRESS NOTES
Daily Note     Today's date: 2020  Patient name: Beverley Nguyen  : 1950  MRN: [id number]  Referring provider: Regina Magallanes MD  Dx:   Encounter Diagnosis     ICD-10-CM    1  S/P right rotator cuff repair  Z98 890                   Subjective: Pt reports slight increase in soreness today which she believes is due to performing exercises at home  Objective: See treatment diary below      Assessment: Tolerated treatment well  Patient demonstrated fatigue post treatment, exhibited good technique with therapeutic exercises and would benefit from continued PT   She c/o intermittent pain throughout treatment  Cues required for technique w/ exercise  Plan: Continue per plan of care        Precautions: hx uterine CA,protocol      Manuals 9/10 9/14 9/17 9/22         PROM R shoulder per protocol 10 MIN 10 min 10 min 10 min                                                Neuro Re-Ed            Shoulder isometrics 4 way   5"x10 ea 5"x10 ea                                                                                       Ther Ex 9/10 9/14 9/17 9/22         pulleys 5 min 5 min 5 min 5 min         Wall slides 10" x10 10" x10 10" x10 10" x10         Table slides flex, scap, ER 10" x10 ea 10" x10 10" x10 10' x10         Supine wand flex and scap 10" x10 ea 10" x10 10" x10 10" x10         Supine wand ER  10" x10 10" x10 10" x10                                                Ther Activity                                       Gait Training                                       Modalities 9/10 9/14 9/17 9/22         CP post 10 min 10 min 10 min np

## 2020-09-24 ENCOUNTER — OFFICE VISIT (OUTPATIENT)
Dept: PHYSICAL THERAPY | Facility: MEDICAL CENTER | Age: 70
End: 2020-09-24
Payer: COMMERCIAL

## 2020-09-24 DIAGNOSIS — Z98.890 S/P RIGHT ROTATOR CUFF REPAIR: Primary | ICD-10-CM

## 2020-09-24 PROCEDURE — 97140 MANUAL THERAPY 1/> REGIONS: CPT | Performed by: PHYSICAL THERAPIST

## 2020-09-24 PROCEDURE — 97110 THERAPEUTIC EXERCISES: CPT | Performed by: PHYSICAL THERAPIST

## 2020-09-24 PROCEDURE — 97112 NEUROMUSCULAR REEDUCATION: CPT | Performed by: PHYSICAL THERAPIST

## 2020-09-24 NOTE — PROGRESS NOTES
Daily Note     Today's date: 2020  Patient name: Srikanth Hutchinson  : 1950  MRN: [id number]  Referring provider: Ana Montgomery MD  Dx:   Encounter Diagnosis     ICD-10-CM    1  S/P right rotator cuff repair  Z98 890                   Subjective: Pt reports slight improvement compared to last visit  Objective: See treatment diary below      Assessment: Tolerated treatment well  Patient demonstrated fatigue post treatment, exhibited good technique with therapeutic exercises and would benefit from continued PT   Continues to c/o intermittent pain w/ stretches and requires frequent cues for technique  Plan: Continue per plan of care        Precautions: hx uterine CA,protocol      Manuals 9/10 9/14 9/17 9/22 9/24        PROM R shoulder per protocol 10 MIN 10 min 10 min 10 min 10 min                                               Neuro Re-Ed           Shoulder isometrics 4 way   5"x10 ea 5"x10 ea 5"x10 ea                                                                                      Ther Ex 9/10 9/14 9/17 9/22 9/24        pulleys 5 min 5 min 5 min 5 min 5 min        Wall slides 10" x10 10" x10 10" x10 10" x10 10" x10        Table slides flex, scap, ER 10" x10 ea 10" x10 10" x10 10' x10 10" x10        Supine wand flex and scap 10" x10 ea 10" x10 10" x10 10" x10 10" x10        Supine wand ER  10" x10 10" x10 10" x10 10" x10                                               Ther Activity                                       Gait Training                                       Modalities 9/10 9/14 9/17 9/22 9/24        CP post 10 min 10 min 10 min np 10 min

## 2020-09-29 ENCOUNTER — OFFICE VISIT (OUTPATIENT)
Dept: PHYSICAL THERAPY | Facility: MEDICAL CENTER | Age: 70
End: 2020-09-29
Payer: COMMERCIAL

## 2020-09-29 DIAGNOSIS — Z98.890 S/P RIGHT ROTATOR CUFF REPAIR: Primary | ICD-10-CM

## 2020-09-29 PROCEDURE — 97140 MANUAL THERAPY 1/> REGIONS: CPT | Performed by: PHYSICAL THERAPIST

## 2020-09-29 PROCEDURE — 97110 THERAPEUTIC EXERCISES: CPT | Performed by: PHYSICAL THERAPIST

## 2020-09-29 PROCEDURE — 97112 NEUROMUSCULAR REEDUCATION: CPT | Performed by: PHYSICAL THERAPIST

## 2020-09-29 NOTE — PROGRESS NOTES
Daily Note     Today's date: 2020  Patient name: Mary Workman  : 1950  MRN: [id number]  Referring provider: Guillermo Wong MD  Dx:   Encounter Diagnosis     ICD-10-CM    1  S/P right rotator cuff repair  Z98 890                   Subjective:  Pt offers no new complaints today; she does report compliance w/ HEP c/o most pain w/ abduction and ER  Objective: See treatment diary below      Assessment: Tolerated treatment well  Patient demonstrated fatigue post treatment, exhibited good technique with therapeutic exercises and would benefit from continued PT   Progressed treatment as charted w/ good tolerance  Plan: Continue per plan of care        Precautions: hx uterine CA,protocol      Manuals 9/10 9/14 9/17 9/22 9/24 9/29       PROM R shoulder per protocol 10 MIN 10 min 10 min 10 min 10 min 10 min                                              Neuro Re-Ed          Shoulder isometrics 4 way   5"x10 ea 5"x10 ea 5"x10 ea 5"x10 ea       Supine active flex      x15       sidelying active abd      x15                                                           Ther Ex 9/10 9/14 9/17 9/22 9/24 9/29       pulleys 5 min 5 min 5 min 5 min 5 min 5 min       Wall slides 10" x10 10" x10 10" x10 10" x10 10" x10 10" x10       Table slides flex, scap, ER 10" x10 ea 10" x10 10" x10 10' x10 10" x10 HEP       Supine wand flex and scap 10" x10 ea 10" x10 10" x10 10" x10 10" x10 10" x10 ea       Supine wand ER  10" x10 10" x10 10" x10 10" x10 10" x10       Standing wand IR and ext      10" x10 ea                                 Ther Activity                                       Gait Training                                       Modalities 9/10 9/14 9/17 9/22 9/24 9/29       CP post 10 min 10 min 10 min np 10 min 10 min

## 2020-10-01 ENCOUNTER — OFFICE VISIT (OUTPATIENT)
Dept: PHYSICAL THERAPY | Facility: MEDICAL CENTER | Age: 70
End: 2020-10-01
Payer: COMMERCIAL

## 2020-10-01 DIAGNOSIS — Z98.890 S/P RIGHT ROTATOR CUFF REPAIR: Primary | ICD-10-CM

## 2020-10-01 PROCEDURE — 97110 THERAPEUTIC EXERCISES: CPT | Performed by: PHYSICAL THERAPIST

## 2020-10-01 PROCEDURE — 97112 NEUROMUSCULAR REEDUCATION: CPT | Performed by: PHYSICAL THERAPIST

## 2020-10-01 PROCEDURE — 97140 MANUAL THERAPY 1/> REGIONS: CPT | Performed by: PHYSICAL THERAPIST

## 2020-10-05 ENCOUNTER — OFFICE VISIT (OUTPATIENT)
Dept: OBGYN CLINIC | Facility: OTHER | Age: 70
End: 2020-10-05

## 2020-10-05 VITALS
SYSTOLIC BLOOD PRESSURE: 149 MMHG | TEMPERATURE: 97.5 F | WEIGHT: 205 LBS | BODY MASS INDEX: 35.19 KG/M2 | HEART RATE: 71 BPM | DIASTOLIC BLOOD PRESSURE: 84 MMHG

## 2020-10-05 DIAGNOSIS — Z47.89 AFTERCARE FOLLOWING SURGERY OF THE MUSCULOSKELETAL SYSTEM: Primary | ICD-10-CM

## 2020-10-05 PROCEDURE — 99024 POSTOP FOLLOW-UP VISIT: CPT | Performed by: PHYSICIAN ASSISTANT

## 2020-10-05 RX ORDER — LEVOTHYROXINE SODIUM 0.1 MG/1
TABLET ORAL
COMMUNITY
Start: 2020-09-15

## 2020-10-06 ENCOUNTER — OFFICE VISIT (OUTPATIENT)
Dept: PHYSICAL THERAPY | Facility: MEDICAL CENTER | Age: 70
End: 2020-10-06
Payer: COMMERCIAL

## 2020-10-06 DIAGNOSIS — Z98.890 S/P RIGHT ROTATOR CUFF REPAIR: Primary | ICD-10-CM

## 2020-10-06 PROCEDURE — 97110 THERAPEUTIC EXERCISES: CPT | Performed by: PHYSICAL THERAPIST

## 2020-10-06 PROCEDURE — 97140 MANUAL THERAPY 1/> REGIONS: CPT | Performed by: PHYSICAL THERAPIST

## 2020-10-06 PROCEDURE — 97112 NEUROMUSCULAR REEDUCATION: CPT | Performed by: PHYSICAL THERAPIST

## 2020-10-08 ENCOUNTER — OFFICE VISIT (OUTPATIENT)
Dept: PHYSICAL THERAPY | Facility: MEDICAL CENTER | Age: 70
End: 2020-10-08
Payer: COMMERCIAL

## 2020-10-08 ENCOUNTER — TELEPHONE (OUTPATIENT)
Dept: OBGYN CLINIC | Facility: HOSPITAL | Age: 70
End: 2020-10-08

## 2020-10-08 DIAGNOSIS — Z98.890 S/P RIGHT ROTATOR CUFF REPAIR: Primary | ICD-10-CM

## 2020-10-08 PROCEDURE — 97110 THERAPEUTIC EXERCISES: CPT | Performed by: PHYSICAL MEDICINE & REHABILITATION

## 2020-10-08 PROCEDURE — 97112 NEUROMUSCULAR REEDUCATION: CPT | Performed by: PHYSICAL MEDICINE & REHABILITATION

## 2020-10-08 PROCEDURE — 97140 MANUAL THERAPY 1/> REGIONS: CPT | Performed by: PHYSICAL MEDICINE & REHABILITATION

## 2020-10-09 ENCOUNTER — TELEPHONE (OUTPATIENT)
Dept: OBGYN CLINIC | Facility: HOSPITAL | Age: 70
End: 2020-10-09

## 2020-10-13 ENCOUNTER — OFFICE VISIT (OUTPATIENT)
Dept: PHYSICAL THERAPY | Facility: MEDICAL CENTER | Age: 70
End: 2020-10-13
Payer: COMMERCIAL

## 2020-10-13 DIAGNOSIS — Z98.890 S/P RIGHT ROTATOR CUFF REPAIR: Primary | ICD-10-CM

## 2020-10-13 PROCEDURE — 97112 NEUROMUSCULAR REEDUCATION: CPT | Performed by: PHYSICAL THERAPIST

## 2020-10-13 PROCEDURE — 97110 THERAPEUTIC EXERCISES: CPT | Performed by: PHYSICAL THERAPIST

## 2020-10-13 PROCEDURE — 97140 MANUAL THERAPY 1/> REGIONS: CPT | Performed by: PHYSICAL THERAPIST

## 2020-10-15 ENCOUNTER — OFFICE VISIT (OUTPATIENT)
Dept: PHYSICAL THERAPY | Facility: MEDICAL CENTER | Age: 70
End: 2020-10-15
Payer: COMMERCIAL

## 2020-10-15 DIAGNOSIS — Z98.890 S/P RIGHT ROTATOR CUFF REPAIR: Primary | ICD-10-CM

## 2020-10-15 PROCEDURE — 97140 MANUAL THERAPY 1/> REGIONS: CPT | Performed by: PHYSICAL MEDICINE & REHABILITATION

## 2020-10-15 PROCEDURE — 97110 THERAPEUTIC EXERCISES: CPT | Performed by: PHYSICAL MEDICINE & REHABILITATION

## 2020-10-15 PROCEDURE — 97112 NEUROMUSCULAR REEDUCATION: CPT | Performed by: PHYSICAL MEDICINE & REHABILITATION

## 2020-10-20 ENCOUNTER — OFFICE VISIT (OUTPATIENT)
Dept: PHYSICAL THERAPY | Facility: MEDICAL CENTER | Age: 70
End: 2020-10-20
Payer: COMMERCIAL

## 2020-10-20 DIAGNOSIS — Z98.890 S/P RIGHT ROTATOR CUFF REPAIR: Primary | ICD-10-CM

## 2020-10-20 PROCEDURE — 97112 NEUROMUSCULAR REEDUCATION: CPT | Performed by: PHYSICAL THERAPIST

## 2020-10-20 PROCEDURE — 97140 MANUAL THERAPY 1/> REGIONS: CPT | Performed by: PHYSICAL THERAPIST

## 2020-10-20 PROCEDURE — 97110 THERAPEUTIC EXERCISES: CPT | Performed by: PHYSICAL THERAPIST

## 2020-10-22 ENCOUNTER — EVALUATION (OUTPATIENT)
Dept: PHYSICAL THERAPY | Facility: MEDICAL CENTER | Age: 70
End: 2020-10-22
Payer: COMMERCIAL

## 2020-10-22 DIAGNOSIS — Z98.890 S/P RIGHT ROTATOR CUFF REPAIR: Primary | ICD-10-CM

## 2020-10-22 PROCEDURE — 97140 MANUAL THERAPY 1/> REGIONS: CPT | Performed by: PHYSICAL THERAPIST

## 2020-10-22 PROCEDURE — 97112 NEUROMUSCULAR REEDUCATION: CPT | Performed by: PHYSICAL THERAPIST

## 2020-10-22 PROCEDURE — 97110 THERAPEUTIC EXERCISES: CPT | Performed by: PHYSICAL THERAPIST

## 2020-10-27 ENCOUNTER — OFFICE VISIT (OUTPATIENT)
Dept: PHYSICAL THERAPY | Facility: MEDICAL CENTER | Age: 70
End: 2020-10-27
Payer: COMMERCIAL

## 2020-10-27 DIAGNOSIS — Z98.890 S/P RIGHT ROTATOR CUFF REPAIR: Primary | ICD-10-CM

## 2020-10-27 PROCEDURE — 97110 THERAPEUTIC EXERCISES: CPT | Performed by: PHYSICAL THERAPIST

## 2020-10-27 PROCEDURE — 97112 NEUROMUSCULAR REEDUCATION: CPT | Performed by: PHYSICAL THERAPIST

## 2020-10-27 PROCEDURE — 97140 MANUAL THERAPY 1/> REGIONS: CPT | Performed by: PHYSICAL THERAPIST

## 2020-10-29 ENCOUNTER — TELEPHONE (OUTPATIENT)
Dept: OBGYN CLINIC | Facility: HOSPITAL | Age: 70
End: 2020-10-29

## 2020-10-29 ENCOUNTER — OFFICE VISIT (OUTPATIENT)
Dept: PHYSICAL THERAPY | Facility: MEDICAL CENTER | Age: 70
End: 2020-10-29
Payer: COMMERCIAL

## 2020-10-29 DIAGNOSIS — Z98.890 S/P RIGHT ROTATOR CUFF REPAIR: Primary | ICD-10-CM

## 2020-10-29 PROCEDURE — 97112 NEUROMUSCULAR REEDUCATION: CPT | Performed by: PHYSICAL THERAPIST

## 2020-10-29 PROCEDURE — 97110 THERAPEUTIC EXERCISES: CPT | Performed by: PHYSICAL THERAPIST

## 2020-10-29 PROCEDURE — 97140 MANUAL THERAPY 1/> REGIONS: CPT | Performed by: PHYSICAL THERAPIST

## 2020-11-03 ENCOUNTER — OFFICE VISIT (OUTPATIENT)
Dept: PHYSICAL THERAPY | Facility: MEDICAL CENTER | Age: 70
End: 2020-11-03
Payer: COMMERCIAL

## 2020-11-03 DIAGNOSIS — Z98.890 S/P RIGHT ROTATOR CUFF REPAIR: Primary | ICD-10-CM

## 2020-11-03 PROCEDURE — 97140 MANUAL THERAPY 1/> REGIONS: CPT | Performed by: PHYSICAL THERAPIST

## 2020-11-03 PROCEDURE — 97110 THERAPEUTIC EXERCISES: CPT | Performed by: PHYSICAL THERAPIST

## 2020-11-03 PROCEDURE — 97112 NEUROMUSCULAR REEDUCATION: CPT | Performed by: PHYSICAL THERAPIST

## 2020-11-05 ENCOUNTER — APPOINTMENT (OUTPATIENT)
Dept: PHYSICAL THERAPY | Facility: MEDICAL CENTER | Age: 70
End: 2020-11-05
Payer: COMMERCIAL

## 2020-11-06 ENCOUNTER — OFFICE VISIT (OUTPATIENT)
Dept: PHYSICAL THERAPY | Facility: MEDICAL CENTER | Age: 70
End: 2020-11-06
Payer: COMMERCIAL

## 2020-11-06 DIAGNOSIS — Z98.890 S/P RIGHT ROTATOR CUFF REPAIR: Primary | ICD-10-CM

## 2020-11-06 PROCEDURE — 97140 MANUAL THERAPY 1/> REGIONS: CPT | Performed by: PHYSICAL THERAPIST

## 2020-11-06 PROCEDURE — 97112 NEUROMUSCULAR REEDUCATION: CPT | Performed by: PHYSICAL THERAPIST

## 2020-11-10 ENCOUNTER — APPOINTMENT (OUTPATIENT)
Dept: PHYSICAL THERAPY | Facility: MEDICAL CENTER | Age: 70
End: 2020-11-10
Payer: COMMERCIAL

## 2020-11-12 ENCOUNTER — OFFICE VISIT (OUTPATIENT)
Dept: PHYSICAL THERAPY | Facility: MEDICAL CENTER | Age: 70
End: 2020-11-12
Payer: COMMERCIAL

## 2020-11-12 DIAGNOSIS — Z98.890 S/P RIGHT ROTATOR CUFF REPAIR: Primary | ICD-10-CM

## 2020-11-12 PROCEDURE — 97110 THERAPEUTIC EXERCISES: CPT | Performed by: PHYSICAL THERAPIST

## 2020-11-12 PROCEDURE — 97112 NEUROMUSCULAR REEDUCATION: CPT | Performed by: PHYSICAL THERAPIST

## 2020-11-12 PROCEDURE — 97140 MANUAL THERAPY 1/> REGIONS: CPT | Performed by: PHYSICAL THERAPIST

## 2020-11-13 ENCOUNTER — OFFICE VISIT (OUTPATIENT)
Dept: PHYSICAL THERAPY | Facility: MEDICAL CENTER | Age: 70
End: 2020-11-13
Payer: COMMERCIAL

## 2020-11-13 DIAGNOSIS — Z98.890 S/P RIGHT ROTATOR CUFF REPAIR: Primary | ICD-10-CM

## 2020-11-13 PROCEDURE — 97140 MANUAL THERAPY 1/> REGIONS: CPT | Performed by: PHYSICAL THERAPIST

## 2020-11-13 PROCEDURE — 97112 NEUROMUSCULAR REEDUCATION: CPT | Performed by: PHYSICAL THERAPIST

## 2020-11-13 PROCEDURE — 97110 THERAPEUTIC EXERCISES: CPT | Performed by: PHYSICAL THERAPIST

## 2020-11-17 ENCOUNTER — OFFICE VISIT (OUTPATIENT)
Dept: PHYSICAL THERAPY | Facility: MEDICAL CENTER | Age: 70
End: 2020-11-17
Payer: COMMERCIAL

## 2020-11-17 DIAGNOSIS — Z98.890 S/P RIGHT ROTATOR CUFF REPAIR: Primary | ICD-10-CM

## 2020-11-17 PROCEDURE — 97112 NEUROMUSCULAR REEDUCATION: CPT | Performed by: PHYSICAL THERAPIST

## 2020-11-17 PROCEDURE — 97140 MANUAL THERAPY 1/> REGIONS: CPT | Performed by: PHYSICAL THERAPIST

## 2020-11-19 ENCOUNTER — OFFICE VISIT (OUTPATIENT)
Dept: PHYSICAL THERAPY | Facility: MEDICAL CENTER | Age: 70
End: 2020-11-19
Payer: COMMERCIAL

## 2020-11-19 DIAGNOSIS — Z98.890 S/P RIGHT ROTATOR CUFF REPAIR: Primary | ICD-10-CM

## 2020-11-19 PROCEDURE — 97140 MANUAL THERAPY 1/> REGIONS: CPT | Performed by: PHYSICAL THERAPIST

## 2020-11-19 PROCEDURE — 97110 THERAPEUTIC EXERCISES: CPT | Performed by: PHYSICAL THERAPIST

## 2020-11-19 PROCEDURE — 97112 NEUROMUSCULAR REEDUCATION: CPT | Performed by: PHYSICAL THERAPIST

## 2020-11-23 ENCOUNTER — OFFICE VISIT (OUTPATIENT)
Dept: PHYSICAL THERAPY | Facility: MEDICAL CENTER | Age: 70
End: 2020-11-23
Payer: COMMERCIAL

## 2020-11-23 DIAGNOSIS — Z98.890 S/P RIGHT ROTATOR CUFF REPAIR: Primary | ICD-10-CM

## 2020-11-23 PROCEDURE — 97112 NEUROMUSCULAR REEDUCATION: CPT | Performed by: PHYSICAL THERAPIST

## 2020-11-23 PROCEDURE — 97140 MANUAL THERAPY 1/> REGIONS: CPT | Performed by: PHYSICAL THERAPIST

## 2020-11-25 ENCOUNTER — OFFICE VISIT (OUTPATIENT)
Dept: PHYSICAL THERAPY | Facility: MEDICAL CENTER | Age: 70
End: 2020-11-25
Payer: COMMERCIAL

## 2020-11-25 DIAGNOSIS — Z98.890 S/P RIGHT ROTATOR CUFF REPAIR: Primary | ICD-10-CM

## 2020-11-25 PROCEDURE — 97112 NEUROMUSCULAR REEDUCATION: CPT | Performed by: PHYSICAL THERAPIST

## 2020-11-25 PROCEDURE — 97140 MANUAL THERAPY 1/> REGIONS: CPT | Performed by: PHYSICAL THERAPIST

## 2020-12-01 ENCOUNTER — OFFICE VISIT (OUTPATIENT)
Dept: PHYSICAL THERAPY | Facility: MEDICAL CENTER | Age: 70
End: 2020-12-01
Payer: COMMERCIAL

## 2020-12-01 DIAGNOSIS — Z98.890 S/P RIGHT ROTATOR CUFF REPAIR: Primary | ICD-10-CM

## 2020-12-01 PROCEDURE — 97140 MANUAL THERAPY 1/> REGIONS: CPT | Performed by: PHYSICAL THERAPIST

## 2020-12-01 PROCEDURE — 97112 NEUROMUSCULAR REEDUCATION: CPT | Performed by: PHYSICAL THERAPIST

## 2020-12-03 ENCOUNTER — EVALUATION (OUTPATIENT)
Dept: PHYSICAL THERAPY | Facility: MEDICAL CENTER | Age: 70
End: 2020-12-03
Payer: COMMERCIAL

## 2020-12-03 DIAGNOSIS — Z98.890 S/P RIGHT ROTATOR CUFF REPAIR: Primary | ICD-10-CM

## 2020-12-03 PROCEDURE — 97112 NEUROMUSCULAR REEDUCATION: CPT | Performed by: PHYSICAL THERAPIST

## 2020-12-03 PROCEDURE — 97140 MANUAL THERAPY 1/> REGIONS: CPT | Performed by: PHYSICAL THERAPIST

## 2020-12-07 ENCOUNTER — OFFICE VISIT (OUTPATIENT)
Dept: OBGYN CLINIC | Facility: OTHER | Age: 70
End: 2020-12-07
Payer: COMMERCIAL

## 2020-12-07 VITALS
SYSTOLIC BLOOD PRESSURE: 136 MMHG | HEIGHT: 64 IN | WEIGHT: 206.4 LBS | DIASTOLIC BLOOD PRESSURE: 78 MMHG | HEART RATE: 61 BPM | BODY MASS INDEX: 35.24 KG/M2

## 2020-12-07 DIAGNOSIS — M75.101 TEAR OF RIGHT SUPRASPINATUS TENDON: Primary | ICD-10-CM

## 2020-12-07 PROCEDURE — 99213 OFFICE O/P EST LOW 20 MIN: CPT | Performed by: PHYSICIAN ASSISTANT

## 2020-12-08 ENCOUNTER — OFFICE VISIT (OUTPATIENT)
Dept: PHYSICAL THERAPY | Facility: MEDICAL CENTER | Age: 70
End: 2020-12-08
Payer: COMMERCIAL

## 2020-12-08 DIAGNOSIS — Z98.890 S/P RIGHT ROTATOR CUFF REPAIR: Primary | ICD-10-CM

## 2020-12-08 PROCEDURE — 97140 MANUAL THERAPY 1/> REGIONS: CPT | Performed by: PHYSICAL THERAPIST

## 2020-12-08 PROCEDURE — 97112 NEUROMUSCULAR REEDUCATION: CPT | Performed by: PHYSICAL THERAPIST

## 2020-12-10 ENCOUNTER — OFFICE VISIT (OUTPATIENT)
Dept: PHYSICAL THERAPY | Facility: MEDICAL CENTER | Age: 70
End: 2020-12-10
Payer: COMMERCIAL

## 2020-12-10 DIAGNOSIS — Z98.890 S/P RIGHT ROTATOR CUFF REPAIR: Primary | ICD-10-CM

## 2020-12-10 PROCEDURE — 97112 NEUROMUSCULAR REEDUCATION: CPT | Performed by: PHYSICAL THERAPIST

## 2020-12-10 PROCEDURE — 97140 MANUAL THERAPY 1/> REGIONS: CPT | Performed by: PHYSICAL THERAPIST

## 2020-12-15 ENCOUNTER — OFFICE VISIT (OUTPATIENT)
Dept: PHYSICAL THERAPY | Facility: MEDICAL CENTER | Age: 70
End: 2020-12-15
Payer: COMMERCIAL

## 2020-12-15 DIAGNOSIS — Z98.890 S/P RIGHT ROTATOR CUFF REPAIR: Primary | ICD-10-CM

## 2020-12-15 PROCEDURE — 97112 NEUROMUSCULAR REEDUCATION: CPT | Performed by: PHYSICAL THERAPIST

## 2020-12-15 PROCEDURE — 97140 MANUAL THERAPY 1/> REGIONS: CPT | Performed by: PHYSICAL THERAPIST

## 2020-12-22 ENCOUNTER — OFFICE VISIT (OUTPATIENT)
Dept: PHYSICAL THERAPY | Facility: MEDICAL CENTER | Age: 70
End: 2020-12-22
Payer: COMMERCIAL

## 2020-12-22 DIAGNOSIS — Z98.890 S/P RIGHT ROTATOR CUFF REPAIR: Primary | ICD-10-CM

## 2020-12-22 PROCEDURE — 97112 NEUROMUSCULAR REEDUCATION: CPT | Performed by: PHYSICAL THERAPIST

## 2020-12-22 PROCEDURE — 97140 MANUAL THERAPY 1/> REGIONS: CPT | Performed by: PHYSICAL THERAPIST

## 2020-12-29 ENCOUNTER — OFFICE VISIT (OUTPATIENT)
Dept: PHYSICAL THERAPY | Facility: MEDICAL CENTER | Age: 70
End: 2020-12-29
Payer: COMMERCIAL

## 2020-12-29 DIAGNOSIS — Z98.890 S/P RIGHT ROTATOR CUFF REPAIR: Primary | ICD-10-CM

## 2020-12-29 PROCEDURE — 97140 MANUAL THERAPY 1/> REGIONS: CPT | Performed by: PHYSICAL THERAPIST

## 2020-12-29 PROCEDURE — 97112 NEUROMUSCULAR REEDUCATION: CPT | Performed by: PHYSICAL THERAPIST

## 2021-01-05 ENCOUNTER — OFFICE VISIT (OUTPATIENT)
Dept: PHYSICAL THERAPY | Facility: MEDICAL CENTER | Age: 71
End: 2021-01-05
Payer: COMMERCIAL

## 2021-01-05 DIAGNOSIS — Z98.890 S/P RIGHT ROTATOR CUFF REPAIR: Primary | ICD-10-CM

## 2021-01-05 PROCEDURE — 97140 MANUAL THERAPY 1/> REGIONS: CPT | Performed by: PHYSICAL THERAPIST

## 2021-01-05 PROCEDURE — 97112 NEUROMUSCULAR REEDUCATION: CPT | Performed by: PHYSICAL THERAPIST

## 2021-01-05 NOTE — PROGRESS NOTES
Daily Note     Today's date: 2021  Patient name: Olya Cisse  : 1950  MRN: [id number]  Referring provider: Gil Chavez MD  Dx:   Encounter Diagnosis     ICD-10-CM    1  S/P right rotator cuff repair  Z98 890                   Subjective: Pt offers no complaints prior to treatment  Objective: See treatment diary below      Assessment: Tolerated treatment well  Patient demonstrated fatigue post treatment, exhibited good technique with therapeutic exercises and would benefit from continued PT   Most difficulty persists w/ seated scaption due to weakness  Plan: Continue per plan of care        Precautions: hx uterine CA,protocol      Manuals 12/3 12/8 12/10 12/15 12/22 12/ 29 1/5      PROM R shoulder per protocol 10 MIN 10 min 10 min 10 min 10 min 10 min 10 min                                             Neuro Re-Ed 12/3 12/8 12/10 12/15 12/22 12/ 29 1/5      sidelying ER 2# x20 2# x20 2# x20 3# x20 3# x20 4# x20 4# x20      Supine active flex 2# x20 2# x20 2# x20 3# x20 3#x20 4# x20 4# x20      sidelying active abd 2# x20 2# x20 2# x20 3# x20 3# x20 4# x20 4# x20      TB rows RTB 5"x20 GTB 5"x20 GTB 5"x20 BluTB 5"x20 BluTB 5"x20 BluTB 5"x 20 BlaTB 5"x 20      TB extensions RTB 5"x20 GTB 5"x20 GTB 5"x20 BluTB 5"x20  BluTB5"x20 BluTB 5"x 20 BlaTB 5" x20      TB ER/IR RTB x20 GTB x20 GTB x20 BluTB x20 Blutb x20 BluTB x20 BlaTB x20      Seated flex/scap 2# x20 ea 2# x20 2# x20 3# x20 ea 3# x20 3# x20 4# x20                                                          therEx 12/3 12/8 12/10 12/15 12/22 12/ 29 1/5      pulleys 5 min 5 min 5 min 5 min 5 min 5 min 5 min                                             Ther Activity                                       Gait Training                                       Modalities             CP post

## 2021-01-07 ENCOUNTER — OFFICE VISIT (OUTPATIENT)
Dept: PHYSICAL THERAPY | Facility: MEDICAL CENTER | Age: 71
End: 2021-01-07
Payer: COMMERCIAL

## 2021-01-07 DIAGNOSIS — Z98.890 S/P RIGHT ROTATOR CUFF REPAIR: Primary | ICD-10-CM

## 2021-01-07 PROCEDURE — 97140 MANUAL THERAPY 1/> REGIONS: CPT | Performed by: PHYSICAL THERAPIST

## 2021-01-07 PROCEDURE — 97112 NEUROMUSCULAR REEDUCATION: CPT | Performed by: PHYSICAL THERAPIST

## 2021-01-07 NOTE — PROGRESS NOTES
Daily Note     Today's date: 2021  Patient name: Ananya Cristobal  : 1950  MRN: [id number]  Referring provider: Teresa Lynn MD  Dx:   Encounter Diagnosis     ICD-10-CM    1  S/P right rotator cuff repair  Z98 890                   Subjective: Pt reports continued improvement  Objective: See treatment diary below      Assessment: Tolerated treatment well  Patient demonstrated fatigue post treatment, exhibited good technique with therapeutic exercises and would benefit from continued PT   Challenged w/ Tband ER today  Plan: Continue per plan of care        Precautions: hx uterine CA,protocol      Manuals 12/3 12/8 12/10 12/15 12/22 12/ 29 1/5 1/7     PROM R shoulder per protocol 10 MIN 10 min 10 min 10 min 10 min 10 min 10 min 10 min                                            Neuro Re-Ed 12/3 12/8 12/10 12/15 12/22 12/ 29 1/5 1/7     sidelying ER 2# x20 2# x20 2# x20 3# x20 3# x20 4# x20 4# x20 4# x20     Supine active flex 2# x20 2# x20 2# x20 3# x20 3#x20 4# x20 4# x20 4# x20     sidelying active abd 2# x20 2# x20 2# x20 3# x20 3# x20 4# x20 4# x20 4# x20     TB rows RTB 5"x20 GTB 5"x20 GTB 5"x20 BluTB 5"x20 BluTB 5"x20 BluTB 5"x 20 BlaTB 5"x 20 BlaTB 5"x20     TB extensions RTB 5"x20 GTB 5"x20 GTB 5"x20 BluTB 5"x20  BluTB5"x20 BluTB 5"x 20 BlaTB 5" x20 BlaTB 5"x20     TB ER/IR RTB x20 GTB x20 GTB x20 BluTB x20 Blutb x20 BluTB x20 BlaTB x20 BlaTB x20     Seated flex/scap 2# x20 ea 2# x20 2# x20 3# x20 ea 3# x20 3# x20 4# x20 4# x20                                                         therEx 12/3 12/8 12/10 12/15 12/22 12/ 29 1/5 1/7     pulleys 5 min 5 min 5 min 5 min 5 min 5 min 5 min np                                            Ther Activity                                       Gait Training                                       Modalities             CP post

## 2021-01-11 ENCOUNTER — TELEPHONE (OUTPATIENT)
Dept: OBGYN CLINIC | Facility: HOSPITAL | Age: 71
End: 2021-01-11

## 2021-01-11 NOTE — TELEPHONE ENCOUNTER
DR Lexx Roberts  RE: letter    Patient states she needs letter from [facility] stating she was to remain out of work from 12 07 until her next appt on 02 08    Can the office assist in faxing the letter from 12 07 from [employer] stating this:    FAX# [phone] ATTN: Leave of Absence     Patient states the letter needs to be faxed by 01 12 or she faces termination

## 2021-01-11 NOTE — TELEPHONE ENCOUNTER
She was given a note at her appointment  I will refax the note she was given 4 weeks ago with her return to office date        Fax complete

## 2021-01-12 ENCOUNTER — OFFICE VISIT (OUTPATIENT)
Dept: PHYSICAL THERAPY | Facility: MEDICAL CENTER | Age: 71
End: 2021-01-12
Payer: COMMERCIAL

## 2021-01-12 DIAGNOSIS — Z98.890 S/P RIGHT ROTATOR CUFF REPAIR: Primary | ICD-10-CM

## 2021-01-12 PROCEDURE — 97112 NEUROMUSCULAR REEDUCATION: CPT | Performed by: PHYSICAL THERAPIST

## 2021-01-12 PROCEDURE — 97140 MANUAL THERAPY 1/> REGIONS: CPT | Performed by: PHYSICAL THERAPIST

## 2021-01-12 NOTE — PROGRESS NOTES
Daily Note     Today's date: 2021  Patient name: Ced Douglas  : 1950  MRN: [id number]  Referring provider: Danyelle Weathers MD  Dx:   Encounter Diagnosis     ICD-10-CM    1  S/P right rotator cuff repair  Z98 890                   Subjective: Pt offers no complaints today; reports "it's getting better"      Objective: See treatment diary below      Assessment: Tolerated treatment well  Patient demonstrated fatigue post treatment, exhibited good technique with therapeutic exercises and would benefit from continued PT     Progressing well w/ strengthening  Plan: Continue per plan of care        Precautions: hx uterine CA,protocol      Manuals             PROM R shoulder per protocol 10 MIN                                                   Neuro Re-Ed             sidelying ER 4# x20            Supine active flex 4# x20            sidelying active abd 4# x20            TB rows BlaTB 5"x20            TB extensions BlaTB 5"x20            TB ER/IR BlaTB x20            Seated flex/scap 3# x20            UBE 2 'e way                                                   therEx                                                                 Ther Activity                                       Gait Training                                       Modalities             CP post

## 2021-01-14 ENCOUNTER — APPOINTMENT (OUTPATIENT)
Dept: PHYSICAL THERAPY | Facility: MEDICAL CENTER | Age: 71
End: 2021-01-14
Payer: COMMERCIAL

## 2021-01-15 ENCOUNTER — OFFICE VISIT (OUTPATIENT)
Dept: PHYSICAL THERAPY | Facility: MEDICAL CENTER | Age: 71
End: 2021-01-15
Payer: COMMERCIAL

## 2021-01-15 DIAGNOSIS — Z98.890 S/P RIGHT ROTATOR CUFF REPAIR: Primary | ICD-10-CM

## 2021-01-15 PROCEDURE — 97140 MANUAL THERAPY 1/> REGIONS: CPT | Performed by: PHYSICAL THERAPIST

## 2021-01-15 PROCEDURE — 97112 NEUROMUSCULAR REEDUCATION: CPT | Performed by: PHYSICAL THERAPIST

## 2021-01-15 NOTE — PROGRESS NOTES
Daily Note     Today's date: 1/15/2021  Patient name: Sarah Arredondo  : 1950  MRN: [id number]  Referring provider: Allie Funk MD  Dx:   Encounter Diagnosis     ICD-10-CM    1  S/P right rotator cuff repair  Z98 890                   Subjective: Pt offers no new complaints today      Objective: See treatment diary below      Assessment: Tolerated treatment well  Patient demonstrated fatigue post treatment, exhibited good technique with therapeutic exercises and would benefit from continued PT   Most challenged w/ seated scaption      Plan: Continue per plan of care        Precautions: hx uterine CA,protocol      Manuals 1/12 1/15           PROM R shoulder per protocol 10 MIN 10 min                                                  Neuro Re-Ed 1/12 1/15           sidelying ER 4# x20 4# x20           Supine active flex 4# x20 4# x20           sidelying active abd 4# x20 4# x20           TB rows BlaTB 5"x20 BlaTB 5"x20           TB extensions BlaTB 5"x20 BlaTB 5"x20           TB ER/IR BlaTB x20 BlaTB x20           Seated flex/scap 3# x20 3# x20           UBE 2 'e way 2' eay way                                                  therEx                                                                 Ther Activity                                       Gait Training                                       Modalities             CP post

## 2021-01-19 ENCOUNTER — OFFICE VISIT (OUTPATIENT)
Dept: PHYSICAL THERAPY | Facility: MEDICAL CENTER | Age: 71
End: 2021-01-19
Payer: COMMERCIAL

## 2021-01-19 DIAGNOSIS — Z98.890 S/P RIGHT ROTATOR CUFF REPAIR: Primary | ICD-10-CM

## 2021-01-19 PROCEDURE — 97112 NEUROMUSCULAR REEDUCATION: CPT | Performed by: PHYSICAL THERAPIST

## 2021-01-19 PROCEDURE — 97140 MANUAL THERAPY 1/> REGIONS: CPT | Performed by: PHYSICAL THERAPIST

## 2021-01-19 NOTE — PROGRESS NOTES
Daily Note     Today's date: 2021  Patient name: Sussy Barnett  : 1950  MRN: [id number]  Referring provider: Seth Ypeez MD  Dx:   Encounter Diagnosis     ICD-10-CM    1  S/P right rotator cuff repair  Z98 890                   Subjective:  Pt reports continued improvement  Objective: See treatment diary below      Assessment: Tolerated treatment well  Patient demonstrated fatigue post treatment, exhibited good technique with therapeutic exercises and would benefit from continued PT   Most challenged w/ seated scaption  Tolerated strength progressions very well  Plan: Continue per plan of care        Precautions: hx uterine CA,protocol      Manuals 1/12 1/15 1/19          PROM R shoulder per protocol 10 MIN 10 min 10 min                                                 Neuro Re-Ed 1/12 1/15 1/19          sidelying ER 4# x20 4# x20 4# x22          Supine active flex 4# x20 4# x20 4# x20          sidelying active abd 4# x20 4# x20 4#x20          TB rows BlaTB 5"x20 BlaTB 5"x20 BlaTB 5"x20          TB extensions BlaTB 5"x20 BlaTB 5"x20 BlaTB 5"x20          TB ER/IR BlaTB x20 BlaTB x20 BlaTB x20          Seated flex/scap 3# x20 3# x20 3# x20          UBE 2 'e way 2' eay way 3' ea way          Prone rows   4# x20          Prone shoulder extension   4# x20          Prone horiz abd   nv          Serratus punches   4# x20                                                              Ther Activity                                       Gait Training                                       Modalities             CP post

## 2021-01-21 ENCOUNTER — OFFICE VISIT (OUTPATIENT)
Dept: PHYSICAL THERAPY | Facility: MEDICAL CENTER | Age: 71
End: 2021-01-21
Payer: COMMERCIAL

## 2021-01-21 DIAGNOSIS — Z98.890 S/P RIGHT ROTATOR CUFF REPAIR: Primary | ICD-10-CM

## 2021-01-21 PROCEDURE — 97112 NEUROMUSCULAR REEDUCATION: CPT | Performed by: PHYSICAL THERAPIST

## 2021-01-21 PROCEDURE — 97140 MANUAL THERAPY 1/> REGIONS: CPT | Performed by: PHYSICAL THERAPIST

## 2021-01-21 NOTE — PROGRESS NOTES
Daily Note     Today's date: 2021  Patient name: Jada Dumont  : 1950  MRN: [id number]  Referring provider: Dasia Luna MD  Dx:   Encounter Diagnosis     ICD-10-CM    1  S/P right rotator cuff repair  Z98 890                   Subjective:  Pt reports continued improvement  Objective: See treatment diary below      Assessment: Tolerated treatment well  Patient demonstrated fatigue post treatment, exhibited good technique with therapeutic exercises and would benefit from continued PT   Progressing well w/ strengthening  Plan: Continue per plan of care        Precautions: hx uterine CA,protocol      Manuals 1/12 1/15 1/19 1/21         PROM R shoulder per protocol 10 MIN 10 min 10 min 10 min                                                Neuro Re-Ed 1/12 1/15 1/19 1/21         sidelying ER 4# x20 4# x20 4# x22 4# x20         Supine active flex 4# x20 4# x20 4# x20 4# x20         sidelying active abd 4# x20 4# x20 4#x20 4# x20         TB rows BlaTB 5"x20 BlaTB 5"x20 BlaTB 5"x20 BlaTB 5"x20         TB extensions BlaTB 5"x20 BlaTB 5"x20 BlaTB 5"x20 BlaTB 5"x20         TB ER/IR BlaTB x20 BlaTB x20 BlaTB x20 BlaTB x20         Seated flex/scap 3# x20 3# x20 3# x20 4# x20         UBE 2 'e way 2' eay way 3' ea way 3' ea way         Prone rows   4# x20 4# x20         Prone shoulder extension   4# x20 4# x20         Prone horiz abd   nv nv         Serratus punches   4# x20 4# x20                                                             Ther Activity                                       Gait Training                                       Modalities             CP post

## 2021-01-26 ENCOUNTER — OFFICE VISIT (OUTPATIENT)
Dept: PHYSICAL THERAPY | Facility: MEDICAL CENTER | Age: 71
End: 2021-01-26
Payer: COMMERCIAL

## 2021-01-26 DIAGNOSIS — Z98.890 S/P RIGHT ROTATOR CUFF REPAIR: Primary | ICD-10-CM

## 2021-01-26 PROCEDURE — 97112 NEUROMUSCULAR REEDUCATION: CPT | Performed by: PHYSICAL THERAPIST

## 2021-01-26 PROCEDURE — 97140 MANUAL THERAPY 1/> REGIONS: CPT | Performed by: PHYSICAL THERAPIST

## 2021-01-26 NOTE — PROGRESS NOTES
Daily Note     Today's date: 2021  Patient name: Lauryn Tello  : 1950  MRN: [id number]  Referring provider: Myra Delacruz MD  Dx:   Encounter Diagnosis     ICD-10-CM    1  S/P right rotator cuff repair  Z98 890                   Subjective:  Pt offers no complaints today      Objective: See treatment diary below      Assessment: Tolerated treatment well  Patient demonstrated fatigue post treatment, exhibited good technique with therapeutic exercises and would benefit from continued PT   Continues to require intermittent cueing for technique throughout exercise  Plan: Continue per plan of care        Precautions: hx uterine CA,protocol      Manuals 1/12 1/15 1/19 1/21 1/26        PROM R shoulder per protocol 10 MIN 10 min 10 min 10 min 10 min                                               Neuro Re-Ed 1/12 1/15 1/19 1/21 1/26        sidelying ER 4# x20 4# x20 4# x22 4# x20 4# x20        Supine active flex 4# x20 4# x20 4# x20 4# x20 4# x20        sidelying active abd 4# x20 4# x20 4#x20 4# x20 4# x20        TB rows BlaTB 5"x20 BlaTB 5"x20 BlaTB 5"x20 BlaTB 5"x20 BlaTB 5"x20        TB extensions BlaTB 5"x20 BlaTB 5"x20 BlaTB 5"x20 BlaTB 5"x20 BlaTB 5"x20        TB ER/IR BlaTB x20 BlaTB x20 BlaTB x20 BlaTB x20 BlaTB x20        Seated flex/scap 3# x20 3# x20 3# x20 4# x20 4# x20        UBE 2 'e way 2' eay way 3' ea way 3' ea way 3' ea way        Prone rows   4# x20 4# x20 4# x20        Prone shoulder extension   4# x20 4# x20 4# x20        Prone horiz abd   nv nv 4# x20        Serratus punches   4# x20 4# x20 4# x20                                                            Ther Activity                                       Gait Training                                       Modalities             CP post

## 2021-01-28 ENCOUNTER — EVALUATION (OUTPATIENT)
Dept: PHYSICAL THERAPY | Facility: MEDICAL CENTER | Age: 71
End: 2021-01-28
Payer: COMMERCIAL

## 2021-01-28 DIAGNOSIS — Z98.890 S/P RIGHT ROTATOR CUFF REPAIR: Primary | ICD-10-CM

## 2021-01-28 PROCEDURE — 97112 NEUROMUSCULAR REEDUCATION: CPT | Performed by: PHYSICAL THERAPIST

## 2021-01-28 PROCEDURE — 97140 MANUAL THERAPY 1/> REGIONS: CPT | Performed by: PHYSICAL THERAPIST

## 2021-01-28 NOTE — PROGRESS NOTES
PT Re-Evaluation  and PT Discharge    Today's date: 2021  Patient name: Benson Tolbert  : 1950  MRN: [id number]  Referring provider: Kaity Damon MD  Dx:   Encounter Diagnosis     ICD-10-CM    1  S/P right rotator cuff repair  Z98 890                   Assessment  Assessment details: Benson Tolbert has attended 52 visits since initiating PT and has demonstrated overall improvement  Mobility and strength has improved with decreased reports of pain  Benson Tolbert has demonstrated an improvement in function as well  Currently, she has made steady progress towards her goals, but continue to remain limited w/ heavier overhead lifting, and at times reaching behind body  Hoping to be able to RTW in some capacity  Plan on d/c today to independent HEP  Will call for f/u if needed  Impairments: abnormal or restricted ROM, abnormal movement, activity intolerance, impaired physical strength, lacks appropriate home exercise program and pain with function  Understanding of Dx/Px/POC: good   Prognosis: good    Goals  Short Term Goals:   1  Pain decreased 2 ratings in 6 weeks MET  2   ROM increased 10* in 6 weeks MET  3  Strength increased 1/2 grade in 6 weeks  MET    Long Term Goals:   1  ADLS/IADLS in related activities improved to maximal level in 12 weeks  MET  2  Work performance improved to maximal level in 16 weeks NOT MET  3  Reaching is improved to maximal level in 12 weeks  PARTIALLY MET  4  [place] with HEP in 8 weeks   MET    Plan  Patient would benefit from: skilled physical therapy  Planned modality interventions: cryotherapy  Planned therapy interventions: stretching, strengthening, patient education, neuromuscular re-education, manual therapy, home exercise program, functional ROM exercises, flexibility and therapeutic exercise  Frequency: 2x week  Duration in weeks: 1  Treatment plan discussed with: patient        Subjective Evaluation    History of Present Illness  Mechanism of injury: Pt reports continued improvement  Most pain occurring w/ scrubbing floor in plane of scaption, lifting overhead  Difficulty w/ reaching behind body to clasp bra  She has resumed all housework  MD f/u ; hoping to be released to RTWin some capacity  Typical job duties do require heavy overhead lifting at times, which she reports she may be able to get help with if restrictions are in place           Pain  At best pain ratin  At worst pain ratin    Patient Goals  Patient goals for therapy: decreased pain, increased strength, increased motion, independence with ADLs/IADLs and return to work          Objective     Active Range of Motion   Cervical/Thoracic Spine     Normal active range of motion    Right Shoulder   Flexion: 150 degrees   Abduction: 145 degrees     Right Elbow   Normal active range of motion    Right Wrist   Normal active range of motion    Passive Range of Motion     Right Shoulder   Flexion: 165 degrees   Abduction: 75 degrees   External rotation 45°: 75 degrees   Internal rotation 45°: 60 degrees     Right Elbow   Normal passive range of motion    Strength/Myotome Testing     Right Shoulder     Planes of Motion   Flexion: 4-   Abduction: 3-   External rotation at 90°: 4+   Internal rotation at 90°: 4+       Flowsheet Rows      Most Recent Value   PT/OT G-Codes   Current Score  65   Projected Score  67             Precautions: hx uterine CA,protocol      Manuals             PROM R shoulder per protocol 10 MIN                                      Neuro Re-Ed             UBE 3 min ea way            sidelying ER 4# x20            Supine active flex 4# x20            sidelying active abd 4# x20            TB rows BlTB 5"x20            TB extensions BlaTB 5"x20            TB ER/IR BlaTB x20            Seated flex/scap 4# x20 ea            Serratus punches 4# x20            Prone rows 4# x20            Prone extensions 4# x20            Prone horizontal abd 4# x20 Ther Activity                                       Gait Training                                       Modalities             CP post

## 2021-02-08 ENCOUNTER — OFFICE VISIT (OUTPATIENT)
Dept: OBGYN CLINIC | Facility: OTHER | Age: 71
End: 2021-02-08
Payer: MEDICARE

## 2021-02-08 VITALS
BODY MASS INDEX: 34.84 KG/M2 | SYSTOLIC BLOOD PRESSURE: 148 MMHG | HEART RATE: 57 BPM | WEIGHT: 203 LBS | DIASTOLIC BLOOD PRESSURE: 88 MMHG

## 2021-02-08 DIAGNOSIS — M75.101 TEAR OF RIGHT SUPRASPINATUS TENDON: Primary | ICD-10-CM

## 2021-02-08 PROCEDURE — 99213 OFFICE O/P EST LOW 20 MIN: CPT | Performed by: PHYSICIAN ASSISTANT

## 2021-02-08 NOTE — PROGRESS NOTES
Assessment  Diagnoses and all orders for this visit:    Tear of right supraspinatus tendon        Discussion and Plan:    1  Continue with HEP  2  Work note provided - no lifting more than 20# overhead  3  Follow up in 2 months  4  Activities to tolerance    Subjective:   Patient ID: Mariya Carrington is a 78 y o  female      Wilfridoemmanuel Plascenciaer presents in follow up of the right shoulder  She is over 6 months from rotator cuff repair of the right shoulder  She is doing well and progressing with physical therapy  She was discharged from therapy last week  She denies new injury or trauma  She is able to use the right shoulder for most routine daily activities without pain or limitation  She continues to work on her HEP  She is ready to return to work  She continues to struggle with heavy weight overhead but thinks she can do 20# and anything heavier someone at work can help her  Denies numbness or tingling  The following portions of the patient's history were reviewed and updated as appropriate: allergies, current medications, past family history, past medical history, past social history, past surgical history and problem list     Review of Systems   Constitutional: Negative for chills and fever  HENT: Negative for hearing loss  Eyes: Negative for visual disturbance  Respiratory: Negative for shortness of breath  Cardiovascular: Negative for chest pain  Gastrointestinal: Negative for abdominal pain  Musculoskeletal:        As reviewed in the HPI   Skin: Negative for rash  Neurological:        As reviewed in the HPI   Psychiatric/Behavioral: Negative for agitation  Objective:  /88 (BP Location: Left arm, Patient Position: Sitting, Cuff Size: Adult)   Pulse 57   Wt 92 1 kg (203 lb)   BMI 34 84 kg/m²       Right Shoulder Exam     Tenderness   The patient is experiencing no tenderness  Range of Motion   The patient has normal right shoulder ROM      Muscle Strength   External rotation: 5/5   Supraspinatus: 4/5     Tests   Garcia test: negative  Impingement: negative  Drop arm: negative    Other   Erythema: absent  Scars: present (healed arthroscopic portals)  Sensation: normal  Pulse: present    Comments:  Neg speeds            Physical Exam  Constitutional:       Appearance: She is well-developed  HENT:      Head: Normocephalic  Neck:      Musculoskeletal: Normal range of motion  Pulmonary:      Breath sounds: Normal breath sounds  No wheezing  Skin:     General: Skin is warm and dry  Neurological:      Mental Status: She is alert and oriented to person, place, and time  Psychiatric:         Behavior: Behavior normal          Thought Content:  Thought content normal          Judgment: Judgment normal

## 2021-02-08 NOTE — LETTER
February 8, 2021     Patient: Kristina Dutta   YOB: 1950   Date of Visit: 2/8/2021       To Whom it May Concern:    Dennise Macario is under my professional care  She was seen in my office on 2/8/2021  She may return to work with limitations no lifting more than 20 lbs overhead  Restriction starts 2/9/2021 until follow up in 2 months  If you have any questions or concerns, please don't hesitate to call           Sincerely,            Sam Hopson PA-C under the supervision of Peter Jacobo MD        CC: No Recipients

## 2021-04-18 ENCOUNTER — TELEPHONE (OUTPATIENT)
Dept: OTHER | Facility: OTHER | Age: 71
End: 2021-04-18

## 2021-04-18 NOTE — TELEPHONE ENCOUNTER
Patient call and cancel her appointment and is asking if someone in the office can give her a call to reschedule her appointment

## 2021-04-19 NOTE — TELEPHONE ENCOUNTER
I called and had to leave a voicemail for the patient to call us back  If/when the patient calls back, please reschedule their appointment      Thank you

## 2021-06-29 ENCOUNTER — APPOINTMENT (OUTPATIENT)
Dept: LAB | Facility: MEDICAL CENTER | Age: 71
End: 2021-06-29
Payer: COMMERCIAL

## 2021-06-29 DIAGNOSIS — E03.9 HYPOTHYROIDISM, UNSPECIFIED TYPE: ICD-10-CM

## 2021-06-29 DIAGNOSIS — E78.2 MIXED HYPERLIPIDEMIA: ICD-10-CM

## 2021-06-29 DIAGNOSIS — R53.83 FATIGUE, UNSPECIFIED TYPE: ICD-10-CM

## 2021-06-29 LAB — T3FREE SERPL-MCNC: 2.51 PG/ML (ref 2.3–4.2)

## 2021-06-29 PROCEDURE — 84481 FREE ASSAY (FT-3): CPT

## 2021-12-15 ENCOUNTER — APPOINTMENT (OUTPATIENT)
Dept: LAB | Facility: MEDICAL CENTER | Age: 71
End: 2021-12-15
Payer: COMMERCIAL

## 2021-12-15 DIAGNOSIS — R73.9 BLOOD GLUCOSE ELEVATED: ICD-10-CM

## 2021-12-15 DIAGNOSIS — E78.5 HYPERLIPIDEMIA, UNSPECIFIED HYPERLIPIDEMIA TYPE: ICD-10-CM

## 2021-12-15 DIAGNOSIS — E03.9 HYPOTHYROIDISM, UNSPECIFIED TYPE: ICD-10-CM

## 2021-12-15 LAB — T3FREE SERPL-MCNC: 2.28 PG/ML (ref 2.3–4.2)

## 2021-12-15 PROCEDURE — 84481 FREE ASSAY (FT-3): CPT

## 2022-03-21 ENCOUNTER — APPOINTMENT (OUTPATIENT)
Dept: RADIOLOGY | Facility: MEDICAL CENTER | Age: 72
End: 2022-03-21
Payer: COMMERCIAL

## 2022-03-21 ENCOUNTER — APPOINTMENT (OUTPATIENT)
Dept: LAB | Facility: MEDICAL CENTER | Age: 72
End: 2022-03-21
Payer: COMMERCIAL

## 2022-03-21 DIAGNOSIS — E03.9 HYPOTHYROIDISM, UNSPECIFIED TYPE: ICD-10-CM

## 2022-03-21 DIAGNOSIS — M54.2 CERVICALGIA: ICD-10-CM

## 2022-03-21 LAB — T3FREE SERPL-MCNC: 2.46 PG/ML (ref 2.3–4.2)

## 2022-03-21 PROCEDURE — 72050 X-RAY EXAM NECK SPINE 4/5VWS: CPT

## 2022-03-21 PROCEDURE — 36415 COLL VENOUS BLD VENIPUNCTURE: CPT

## 2022-03-21 PROCEDURE — 84481 FREE ASSAY (FT-3): CPT

## 2022-03-26 ENCOUNTER — APPOINTMENT (OUTPATIENT)
Dept: URGENT CARE | Facility: MEDICAL CENTER | Age: 72
End: 2022-03-26

## 2022-03-26 ENCOUNTER — APPOINTMENT (OUTPATIENT)
Dept: RADIOLOGY | Facility: MEDICAL CENTER | Age: 72
End: 2022-03-26
Payer: OTHER MISCELLANEOUS

## 2022-03-26 DIAGNOSIS — S80.01XA CONTUSION OF RIGHT KNEE, INITIAL ENCOUNTER: Primary | ICD-10-CM

## 2022-03-26 DIAGNOSIS — S80.01XA CONTUSION OF RIGHT KNEE, INITIAL ENCOUNTER: ICD-10-CM

## 2022-03-26 PROCEDURE — 73564 X-RAY EXAM KNEE 4 OR MORE: CPT

## 2022-03-29 ENCOUNTER — APPOINTMENT (OUTPATIENT)
Dept: URGENT CARE | Facility: MEDICAL CENTER | Age: 72
End: 2022-03-29
Payer: OTHER MISCELLANEOUS

## 2022-03-29 PROCEDURE — 99213 OFFICE O/P EST LOW 20 MIN: CPT | Performed by: PHYSICIAN ASSISTANT

## 2022-04-04 ENCOUNTER — APPOINTMENT (OUTPATIENT)
Dept: URGENT CARE | Facility: MEDICAL CENTER | Age: 72
End: 2022-04-04
Payer: OTHER MISCELLANEOUS

## 2022-04-04 PROCEDURE — 99213 OFFICE O/P EST LOW 20 MIN: CPT | Performed by: PHYSICIAN ASSISTANT

## 2022-04-11 ENCOUNTER — APPOINTMENT (OUTPATIENT)
Dept: RADIOLOGY | Age: 72
End: 2022-04-11
Payer: OTHER MISCELLANEOUS

## 2022-04-11 ENCOUNTER — APPOINTMENT (OUTPATIENT)
Dept: URGENT CARE | Age: 72
End: 2022-04-11
Payer: OTHER MISCELLANEOUS

## 2022-04-11 DIAGNOSIS — S59.901A INJURY OF RIGHT ELBOW, INITIAL ENCOUNTER: ICD-10-CM

## 2022-04-11 DIAGNOSIS — S59.901A INJURY OF RIGHT ELBOW, INITIAL ENCOUNTER: Primary | ICD-10-CM

## 2022-04-11 PROCEDURE — 73080 X-RAY EXAM OF ELBOW: CPT

## 2022-04-11 PROCEDURE — 99213 OFFICE O/P EST LOW 20 MIN: CPT | Performed by: PREVENTIVE MEDICINE

## 2022-05-11 ENCOUNTER — APPOINTMENT (OUTPATIENT)
Dept: URGENT CARE | Age: 72
End: 2022-05-11
Payer: OTHER MISCELLANEOUS

## 2022-05-11 PROCEDURE — 99213 OFFICE O/P EST LOW 20 MIN: CPT | Performed by: PREVENTIVE MEDICINE

## 2022-06-08 ENCOUNTER — APPOINTMENT (OUTPATIENT)
Dept: URGENT CARE | Age: 72
End: 2022-06-08
Payer: OTHER MISCELLANEOUS

## 2022-06-08 ENCOUNTER — APPOINTMENT (OUTPATIENT)
Dept: RADIOLOGY | Age: 72
End: 2022-06-08
Payer: COMMERCIAL

## 2022-06-08 DIAGNOSIS — S49.91XA INJURY OF RIGHT UPPER EXTREMITY, INITIAL ENCOUNTER: ICD-10-CM

## 2022-06-08 PROCEDURE — 73060 X-RAY EXAM OF HUMERUS: CPT

## 2022-06-08 PROCEDURE — 73610 X-RAY EXAM OF ANKLE: CPT

## 2022-06-08 PROCEDURE — 99213 OFFICE O/P EST LOW 20 MIN: CPT

## 2022-06-08 PROCEDURE — 73030 X-RAY EXAM OF SHOULDER: CPT

## 2023-08-10 ENCOUNTER — APPOINTMENT (OUTPATIENT)
Dept: LAB | Facility: MEDICAL CENTER | Age: 73
End: 2023-08-10
Payer: COMMERCIAL

## 2023-08-10 DIAGNOSIS — E03.9 HYPOTHYROIDISM, UNSPECIFIED TYPE: ICD-10-CM

## 2023-08-10 DIAGNOSIS — R73.01 IMPAIRED FASTING GLUCOSE: ICD-10-CM

## 2023-08-10 DIAGNOSIS — E78.2 MIXED HYPERLIPIDEMIA: ICD-10-CM

## 2023-08-10 LAB
ALBUMIN SERPL BCP-MCNC: 3.3 G/DL (ref 3.5–5)
ALP SERPL-CCNC: 91 U/L (ref 46–116)
ALT SERPL W P-5'-P-CCNC: 21 U/L (ref 12–78)
ANION GAP SERPL CALCULATED.3IONS-SCNC: 2 MMOL/L
AST SERPL W P-5'-P-CCNC: 5 U/L (ref 5–45)
BILIRUB SERPL-MCNC: 0.39 MG/DL (ref 0.2–1)
BUN SERPL-MCNC: 17 MG/DL (ref 5–25)
CALCIUM ALBUM COR SERPL-MCNC: 10.2 MG/DL (ref 8.3–10.1)
CALCIUM SERPL-MCNC: 9.6 MG/DL (ref 8.3–10.1)
CHLORIDE SERPL-SCNC: 106 MMOL/L (ref 96–108)
CHOLEST SERPL-MCNC: 251 MG/DL
CO2 SERPL-SCNC: 30 MMOL/L (ref 21–32)
CREAT SERPL-MCNC: 0.87 MG/DL (ref 0.6–1.3)
GFR SERPL CREATININE-BSD FRML MDRD: 67 ML/MIN/1.73SQ M
GLUCOSE P FAST SERPL-MCNC: 106 MG/DL (ref 65–99)
HDLC SERPL-MCNC: 73 MG/DL
LDLC SERPL CALC-MCNC: 139 MG/DL (ref 0–100)
NONHDLC SERPL-MCNC: 178 MG/DL
POTASSIUM SERPL-SCNC: 4.7 MMOL/L (ref 3.5–5.3)
PROT SERPL-MCNC: 7.1 G/DL (ref 6.4–8.4)
SODIUM SERPL-SCNC: 138 MMOL/L (ref 135–147)
TRIGL SERPL-MCNC: 195 MG/DL
TSH SERPL DL<=0.05 MIU/L-ACNC: 2.15 UIU/ML (ref 0.45–4.5)

## 2023-08-10 PROCEDURE — 80061 LIPID PANEL: CPT

## 2023-08-10 PROCEDURE — 80053 COMPREHEN METABOLIC PANEL: CPT

## 2023-08-10 PROCEDURE — 84443 ASSAY THYROID STIM HORMONE: CPT

## 2023-08-10 PROCEDURE — 36415 COLL VENOUS BLD VENIPUNCTURE: CPT

## 2023-08-10 PROCEDURE — 83036 HEMOGLOBIN GLYCOSYLATED A1C: CPT

## 2023-08-11 LAB
EST. AVERAGE GLUCOSE BLD GHB EST-MCNC: 134 MG/DL
HBA1C MFR BLD: 6.3 %

## 2024-10-11 ENCOUNTER — APPOINTMENT (OUTPATIENT)
Dept: LAB | Facility: MEDICAL CENTER | Age: 74
End: 2024-10-11
Payer: COMMERCIAL

## 2024-10-11 DIAGNOSIS — R73.9 BLOOD GLUCOSE ELEVATED: ICD-10-CM

## 2024-10-11 LAB — CA-I BLD-SCNC: 1.15 MMOL/L (ref 1.12–1.32)

## 2024-10-11 PROCEDURE — 36415 COLL VENOUS BLD VENIPUNCTURE: CPT

## 2024-10-11 PROCEDURE — 82330 ASSAY OF CALCIUM: CPT

## (undated) DEVICE — PADDING,UNDERCAST,COTTON, 4"X4YD STERILE: Brand: MEDLINE

## (undated) DEVICE — GLOVE INDICATOR PI UNDERGLOVE SZ 7.5 BLUE

## (undated) DEVICE — SUT SILK 0 SH 24 IN K534H

## (undated) DEVICE — CHLORAPREP HI-LITE 26ML ORANGE

## (undated) DEVICE — ADHESIVE SKIN HIGH VISCOSITY EXOFIN 1ML

## (undated) DEVICE — SPONGE PVP SCRUB WING STERILE

## (undated) DEVICE — SHOULDER SUSPENSION KIT 6 PER BOX

## (undated) DEVICE — SUT MONOCRYL 4-0 PS-2 18 IN Y496G

## (undated) DEVICE — INTENDED FOR TISSUE SEPARATION, AND OTHER PROCEDURES THAT REQUIRE A SHARP SURGICAL BLADE TO PUNCTURE OR CUT.: Brand: BARD-PARKER SAFETY BLADES SIZE 11, STERILE

## (undated) DEVICE — GLOVE SRG BIOGEL ECLIPSE 7

## (undated) DEVICE — GLOVE SRG BIOGEL 7.5

## (undated) DEVICE — GELFOAM 100

## (undated) DEVICE — SUT ETHIBOND 2-0 SH-1/SH-1 30 IN X763H

## (undated) DEVICE — C-WIRE PAK DOUBLE ENDED ORTHOPAEDIC WIRE, SPADE, .062" (1.57 MM)
Type: IMPLANTABLE DEVICE | Site: THUMB | Status: NON-FUNCTIONAL
Brand: C-WIRE
Removed: 2019-03-26

## (undated) DEVICE — OCCLUSIVE GAUZE STRIP,3% BISMUTH TRIBROMOPHENATE IN PETROLATUM BLEND: Brand: XEROFORM

## (undated) DEVICE — SUT VICRYL 3-0 SH 27 IN J416H

## (undated) DEVICE — PACK PBDS SHOULDER ARTHROSCOPY RF

## (undated) DEVICE — THREADED CLEAR CANNULA WITH OBTURATOR 7MM X 75MM

## (undated) DEVICE — TUBING SUCTION 5MM X 12 FT

## (undated) DEVICE — THREADED CLEAR CANNULA WITH OBTURATOR 8.5MM X 75MM

## (undated) DEVICE — STRL COTTON TIP APPLCTR 6IN PK: Brand: CARDINAL HEALTH

## (undated) DEVICE — DISPOSABLE EQUIPMENT COVER: Brand: SMALL TOWEL DRAPE

## (undated) DEVICE — VAPR COOLPULSE 90 ELECTRODE 90 DEGREES SUCTION WITH INTEGRATED HANDPIECE: Brand: VAPR COOLPULSE

## (undated) DEVICE — ADHESIVE SKN CLSR HISTOACRYL FLEX 0.5ML LF

## (undated) DEVICE — GLOVE INDICATOR PI UNDERGLOVE SZ 8 BLUE

## (undated) DEVICE — DRESSING MEPILEX AG BORDER 4 X 4 IN

## (undated) DEVICE — GAUZE SPONGES,16 PLY: Brand: CURITY

## (undated) DEVICE — 3M™ IOBAN™ 2 ANTIMICROBIAL INCISE DRAPE 6650EZ: Brand: IOBAN™ 2

## (undated) DEVICE — ACE WRAP 3 IN STERILE

## (undated) DEVICE — CUFF TOURNIQUET 18 X 4 IN QUICK CONNECT DISP 1 BLADDER

## (undated) DEVICE — NEEDLE 25G X 1 1/2

## (undated) DEVICE — STERILE BETHLEHEM PLASTIC HAND: Brand: CARDINAL HEALTH

## (undated) DEVICE — INTENDED FOR TISSUE SEPARATION, AND OTHER PROCEDURES THAT REQUIRE A SHARP SURGICAL BLADE TO PUNCTURE OR CUT.: Brand: BARD-PARKER SAFETY BLADES SIZE 15, STERILE

## (undated) DEVICE — RETROGRADE KNIFE BOX OF 6: Brand: ECTRA

## (undated) DEVICE — SPLINT 5 X 30 IN FAST SET PLASTER

## (undated) DEVICE — BLADE SHAVER DISSECTOR 4MM 13CM COOLCUT

## (undated) DEVICE — LIGHT HANDLE COVER SLEEVE DISP BLUE STELLAR